# Patient Record
Sex: MALE | Race: ASIAN | NOT HISPANIC OR LATINO
[De-identification: names, ages, dates, MRNs, and addresses within clinical notes are randomized per-mention and may not be internally consistent; named-entity substitution may affect disease eponyms.]

---

## 2017-01-24 ENCOUNTER — RESULT REVIEW (OUTPATIENT)
Age: 82
End: 2017-01-24

## 2017-01-25 ENCOUNTER — INPATIENT (INPATIENT)
Facility: HOSPITAL | Age: 82
LOS: 11 days | Discharge: HOME | End: 2017-02-06
Attending: INTERNAL MEDICINE | Admitting: INTERNAL MEDICINE
Payer: MEDICARE

## 2017-01-25 VITALS
DIASTOLIC BLOOD PRESSURE: 74 MMHG | RESPIRATION RATE: 20 BRPM | SYSTOLIC BLOOD PRESSURE: 160 MMHG | TEMPERATURE: 98 F | OXYGEN SATURATION: 94 % | HEART RATE: 101 BPM

## 2017-01-25 DIAGNOSIS — C91.10 CHRONIC LYMPHOCYTIC LEUKEMIA OF B-CELL TYPE NOT HAVING ACHIEVED REMISSION: ICD-10-CM

## 2017-01-25 DIAGNOSIS — E03.9 HYPOTHYROIDISM, UNSPECIFIED: ICD-10-CM

## 2017-01-25 DIAGNOSIS — J90 PLEURAL EFFUSION, NOT ELSEWHERE CLASSIFIED: ICD-10-CM

## 2017-01-25 DIAGNOSIS — I10 ESSENTIAL (PRIMARY) HYPERTENSION: ICD-10-CM

## 2017-01-25 LAB
ALBUMIN FLD-MCNC: 3.1 G/DL — SIGNIFICANT CHANGE UP
ALBUMIN SERPL ELPH-MCNC: 4.2 G/DL — SIGNIFICANT CHANGE UP (ref 3.3–5)
ALP SERPL-CCNC: 93 U/L — SIGNIFICANT CHANGE UP (ref 40–120)
ALT FLD-CCNC: 10 U/L — SIGNIFICANT CHANGE UP (ref 4–41)
AST SERPL-CCNC: 18 U/L — SIGNIFICANT CHANGE UP (ref 4–40)
BASOPHILS # BLD AUTO: 0.41 K/UL — HIGH (ref 0–0.2)
BASOPHILS NFR BLD AUTO: 0.2 % — SIGNIFICANT CHANGE UP (ref 0–2)
BASOPHILS NFR SPEC: 0 % — SIGNIFICANT CHANGE UP (ref 0–2)
BILIRUB SERPL-MCNC: 1.4 MG/DL — HIGH (ref 0.2–1.2)
BODY FLUID TYPE: SIGNIFICANT CHANGE UP
BUN SERPL-MCNC: 22 MG/DL — SIGNIFICANT CHANGE UP (ref 7–23)
CALCIUM SERPL-MCNC: 9.1 MG/DL — SIGNIFICANT CHANGE UP (ref 8.4–10.5)
CHLORIDE SERPL-SCNC: 101 MMOL/L — SIGNIFICANT CHANGE UP (ref 98–107)
CK MB BLD-MCNC: 1.83 NG/ML — SIGNIFICANT CHANGE UP (ref 1–6.6)
CK MB BLD-MCNC: SIGNIFICANT CHANGE UP (ref 0–2.5)
CK SERPL-CCNC: 27 U/L — LOW (ref 30–200)
CLARITY SPEC: SIGNIFICANT CHANGE UP
CO2 SERPL-SCNC: 21 MMOL/L — LOW (ref 22–31)
COLOR FLD: YELLOW — SIGNIFICANT CHANGE UP
CREAT SERPL-MCNC: 1.11 MG/DL — SIGNIFICANT CHANGE UP (ref 0.5–1.3)
EOSINOPHIL # BLD AUTO: 0.03 K/UL — SIGNIFICANT CHANGE UP (ref 0–0.5)
EOSINOPHIL NFR BLD AUTO: 0 % — SIGNIFICANT CHANGE UP (ref 0–6)
EOSINOPHIL NFR FLD: 0 % — SIGNIFICANT CHANGE UP (ref 0–6)
GLUCOSE FLD-MCNC: 102 MG/DL — SIGNIFICANT CHANGE UP
GLUCOSE SERPL-MCNC: 123 MG/DL — HIGH (ref 70–99)
HCT VFR BLD CALC: 30.2 % — LOW (ref 39–50)
HGB BLD-MCNC: 8.7 G/DL — LOW (ref 13–17)
IMM GRANULOCYTES NFR BLD AUTO: 0.4 % — SIGNIFICANT CHANGE UP (ref 0–1.5)
LDH SERPL L TO P-CCNC: 326 U/L — SIGNIFICANT CHANGE UP
LYMPHOCYTES # BLD AUTO: 181.97 K/UL — HIGH (ref 1–3.3)
LYMPHOCYTES # BLD AUTO: 93.6 % — HIGH (ref 13–44)
LYMPHOCYTES NFR FLD: 93 % — SIGNIFICANT CHANGE UP
LYMPHOCYTES NFR SPEC AUTO: 95 % — HIGH (ref 13–44)
MACROCYTES BLD QL: SIGNIFICANT CHANGE UP
MACROPHAGES # FLD: 3 % — SIGNIFICANT CHANGE UP
MANUAL SMEAR VERIFICATION: SIGNIFICANT CHANGE UP
MCHC RBC-ENTMCNC: 28.8 % — LOW (ref 32–36)
MCHC RBC-ENTMCNC: 31.8 PG — SIGNIFICANT CHANGE UP (ref 27–34)
MCV RBC AUTO: 110.2 FL — HIGH (ref 80–100)
MONOCYTES # BLD AUTO: 1.77 K/UL — HIGH (ref 0–0.9)
MONOCYTES # FLD: 1 % — SIGNIFICANT CHANGE UP
MONOCYTES NFR BLD AUTO: 0.9 % — LOW (ref 2–14)
MONOCYTES NFR BLD: 0 % — LOW (ref 2–9)
NEUTROPHIL AB SER-ACNC: 5 % — LOW (ref 43–77)
NEUTROPHILS # BLD AUTO: 9.52 K/UL — HIGH (ref 1.8–7.4)
NEUTROPHILS NFR BLD AUTO: 4.9 % — LOW (ref 43–77)
NEUTS SEG NFR FLD MANUAL: 2 % — SIGNIFICANT CHANGE UP
NT-PROBNP SERPL-SCNC: 1243 PG/ML — SIGNIFICANT CHANGE UP
OTHER CELLS FLD MANUAL: 1 % — SIGNIFICANT CHANGE UP
PLATELET # BLD AUTO: 162 K/UL — SIGNIFICANT CHANGE UP (ref 150–400)
PLATELET COUNT - ESTIMATE: NORMAL — SIGNIFICANT CHANGE UP
PMV BLD: 8.9 FL — SIGNIFICANT CHANGE UP (ref 7–13)
POTASSIUM SERPL-MCNC: 5.4 MMOL/L — HIGH (ref 3.5–5.3)
POTASSIUM SERPL-SCNC: 5.4 MMOL/L — HIGH (ref 3.5–5.3)
PROT FLD-MCNC: 3.9 G/DL — SIGNIFICANT CHANGE UP
PROT SERPL-MCNC: 6.2 G/DL — SIGNIFICANT CHANGE UP (ref 6–8.3)
RBC # BLD: 2.74 M/UL — LOW (ref 4.2–5.8)
RBC # FLD: 15.4 % — HIGH (ref 10.3–14.5)
RCV VOL RI: 420 CELL/UL — HIGH (ref 0–5)
SODIUM SERPL-SCNC: 140 MMOL/L — SIGNIFICANT CHANGE UP (ref 135–145)
TOTAL CELLS COUNTED, BODY FLUID: 100 CELLS — SIGNIFICANT CHANGE UP
TOTAL NUCLEATED CELL COUNT, BODY FLUID: 840 CELL/UL — HIGH (ref 0–5)
TROPONIN T SERPL-MCNC: < 0.06 NG/ML — SIGNIFICANT CHANGE UP (ref 0–0.06)
WBC # BLD: 194.45 K/UL — CRITICAL HIGH (ref 3.8–10.5)
WBC # FLD AUTO: 194.45 K/UL — CRITICAL HIGH (ref 3.8–10.5)

## 2017-01-25 PROCEDURE — 88112 CYTOPATH CELL ENHANCE TECH: CPT | Mod: 26

## 2017-01-25 PROCEDURE — 71010: CPT | Mod: 26,59

## 2017-01-25 PROCEDURE — 88305 TISSUE EXAM BY PATHOLOGIST: CPT | Mod: 26

## 2017-01-25 PROCEDURE — 99223 1ST HOSP IP/OBS HIGH 75: CPT | Mod: GC

## 2017-01-25 PROCEDURE — 71020: CPT | Mod: 26

## 2017-01-25 RX ORDER — MORPHINE SULFATE 50 MG/1
2 CAPSULE, EXTENDED RELEASE ORAL ONCE
Qty: 0 | Refills: 0 | Status: DISCONTINUED | OUTPATIENT
Start: 2017-01-25 | End: 2017-01-25

## 2017-01-25 RX ORDER — LEVOTHYROXINE SODIUM 125 MCG
100 TABLET ORAL DAILY
Qty: 0 | Refills: 0 | Status: DISCONTINUED | OUTPATIENT
Start: 2017-01-25 | End: 2017-02-06

## 2017-01-25 RX ORDER — ALLOPURINOL 300 MG
300 TABLET ORAL DAILY
Qty: 0 | Refills: 0 | Status: DISCONTINUED | OUTPATIENT
Start: 2017-01-25 | End: 2017-02-06

## 2017-01-25 RX ORDER — FINASTERIDE 5 MG/1
5 TABLET, FILM COATED ORAL DAILY
Qty: 0 | Refills: 0 | Status: DISCONTINUED | OUTPATIENT
Start: 2017-01-25 | End: 2017-02-06

## 2017-01-25 RX ORDER — ATORVASTATIN CALCIUM 80 MG/1
20 TABLET, FILM COATED ORAL AT BEDTIME
Qty: 0 | Refills: 0 | Status: DISCONTINUED | OUTPATIENT
Start: 2017-01-25 | End: 2017-02-06

## 2017-01-25 RX ORDER — MORPHINE SULFATE 50 MG/1
2 CAPSULE, EXTENDED RELEASE ORAL EVERY 4 HOURS
Qty: 0 | Refills: 0 | Status: DISCONTINUED | OUTPATIENT
Start: 2017-01-25 | End: 2017-01-27

## 2017-01-25 RX ORDER — ALBUTEROL 90 UG/1
2.5 AEROSOL, METERED ORAL ONCE
Qty: 0 | Refills: 0 | Status: COMPLETED | OUTPATIENT
Start: 2017-01-25 | End: 2017-01-25

## 2017-01-25 RX ORDER — ENOXAPARIN SODIUM 100 MG/ML
40 INJECTION SUBCUTANEOUS EVERY 24 HOURS
Qty: 0 | Refills: 0 | Status: DISCONTINUED | OUTPATIENT
Start: 2017-01-25 | End: 2017-02-06

## 2017-01-25 RX ORDER — SODIUM CHLORIDE 9 MG/ML
1000 INJECTION, SOLUTION INTRAVENOUS ONCE
Qty: 0 | Refills: 0 | Status: COMPLETED | OUTPATIENT
Start: 2017-01-25 | End: 2017-01-25

## 2017-01-25 RX ORDER — IPRATROPIUM/ALBUTEROL SULFATE 18-103MCG
3 AEROSOL WITH ADAPTER (GRAM) INHALATION EVERY 6 HOURS
Qty: 0 | Refills: 0 | Status: DISCONTINUED | OUTPATIENT
Start: 2017-01-25 | End: 2017-02-06

## 2017-01-25 RX ORDER — AMLODIPINE BESYLATE 2.5 MG/1
10 TABLET ORAL DAILY
Qty: 0 | Refills: 0 | Status: DISCONTINUED | OUTPATIENT
Start: 2017-01-25 | End: 2017-02-06

## 2017-01-25 RX ADMIN — ALBUTEROL 2.5 MILLIGRAM(S): 90 AEROSOL, METERED ORAL at 18:00

## 2017-01-25 RX ADMIN — MORPHINE SULFATE 2 MILLIGRAM(S): 50 CAPSULE, EXTENDED RELEASE ORAL at 23:30

## 2017-01-25 RX ADMIN — SODIUM CHLORIDE 500 MILLILITER(S): 9 INJECTION, SOLUTION INTRAVENOUS at 20:49

## 2017-01-25 RX ADMIN — MORPHINE SULFATE 2 MILLIGRAM(S): 50 CAPSULE, EXTENDED RELEASE ORAL at 22:59

## 2017-01-25 RX ADMIN — MORPHINE SULFATE 2 MILLIGRAM(S): 50 CAPSULE, EXTENDED RELEASE ORAL at 20:49

## 2017-01-25 NOTE — ED ADULT NURSE NOTE - OBJECTIVE STATEMENT
Pt recd A+Ox3. C/o SOBx6 weeks, worsening with cough. Denies any CP or hx of cardiac issues. SOB worse with any kind of activity. Large right pleural effusion shown on bedside US. Pt is on 2L NC at this time with 100% O2 sat. Breathing is equal and unlabored at this time at rest. Cardiac monitor in place and VSS. Labs drawn and sent. Will continue to monitor.

## 2017-01-25 NOTE — ED PROVIDER NOTE - OBJECTIVE STATEMENT
86M pmh CLL (finished chemo 1 yr ago), HTN, HLD, Hypothyroid p/w sob. pt has had new onset sob 1 mo associtaed w/ productive cough, worsening. ros + for orthopnea. denies cp, f/c, n/v/d, abdominla pain, dysuria, hx of cardiac dz, recent travel, hx of blood clot, le swelling. quite smoking ~50 years ago, has tried inhaler w/o relief

## 2017-01-25 NOTE — H&P ADULT. - PMH
Adult Hypothyroidism    Benign Prostatic Hypertrophy    CLL (chronic lymphocytic leukemia)    Hyperlipidemia    Hypertension

## 2017-01-25 NOTE — H&P ADULT. - ASSESSMENT
86M hx CLL s/p chemo (RCD regimen), HTN, HLD, hypothyroidism, BPH, p/w progressive SOB & cough, found to have large R-pleural effusion, s/p thoracentesis w/ findings c/w exudative effusion

## 2017-01-25 NOTE — ED PROVIDER NOTE - PROGRESS NOTE DETAILS
iker: bedside u/s shows r pleural effusion & enlarged spleen iker: drained 2200cc fluid from thoracentesis, pts so2 97% on ra, breathing improved. labs significant for cll flare up. will adm to hospitilist. k a little hihg but given no ekg changes, will just repeat iker: drained 2200cc fluid from thoracentesis, pts so2 97% on ra, breathing improved. . will adm to hospitilist. k a little hihg but given no ekg changes, will just repeat

## 2017-01-25 NOTE — H&P ADULT. - HISTORY OF PRESENT ILLNESS
86M hx CLL s/p chemo (RCD regimen), HTN, HLD, hypothyroidism, BPH, p/w progressive SOB & cough. Pt reports SOB on exertion & productive cough x 6 weeks.  He came to ED b/c the SOB was worsening & he began to have orthopnea  He had an evaluation by a pulmonologist a few days ago & was started on an inhaler w/out any improvement in the SOB. He denies fevers, chills, CP, abd pain, N, V, changes in urinary or bowel patterns.  He has had CLL for many years & started chemo about 3 years ago.  He completed a course of rituximab, cyclophosphamide dexamethasone in 1/2016.  He had AIHA a few years ago that is now resolved.     ED course:  T 97.8, , /74, RR 20, O2 94% RA.  Labs remarkable for , lymphocyte predominant, K 5.4.  Bedside US in ED revealed large R-simple pleural effusion. He underwent thoracentesis w/ drainage of 2.2L.  O2 improved to 97%. 86M hx CLL s/p chemo (RCD regimen), HTN, HLD, hypothyroidism, BPH, p/w progressive SOB & cough. Pt reports SOB on exertion & productive cough x 6 weeks.  He came to ED b/c the SOB was worsening & he began to have orthopnea  He had an evaluation by a pulmonologist a few days ago & was started on an inhaler w/out any improvement in the SOB. He denies fevers, chills, CP, abd pain, N, V, changes in urinary or bowel patterns.  He has had CLL for many years & started chemo about 3 years ago.  He completed a course of rituximab, cyclophosphamide dexamethasone in 1/2016.  He had AIHA a few years ago that is now resolved.     ED course:  T 97.8, , /74, RR 20, O2 94% RA.  Labs remarkable for , lymphocyte predominant, K 5.4.  Bedside US in ED revealed large R-simple pleural effusion. He underwent thoracentesis w/ drainage of 2.2L.  O2 improved to 97% on RA

## 2017-01-25 NOTE — ED PROCEDURE NOTE - CPROC ED THORACENTES DETAIL1
The anatomic location was identified, and needle (see size above) was introduced into the pleural space. Sterile technique was used throughout procedure.

## 2017-01-25 NOTE — ED PROVIDER NOTE - CARE PLAN
Principal Discharge DX:	Pleural effusion Principal Discharge DX:	Pleural effusion  Secondary Diagnosis:	CLL (chronic lymphocytic leukemia)

## 2017-01-25 NOTE — ED PROVIDER NOTE - ATTENDING CONTRIBUTION TO CARE
86m, pmhx cll, remote smoker, in remission x 1 year. p/w 6 weeks of progressive sob and cough. no c/p, no leg edema. no f/c, wt loss. no n/v. exam, tachypneic, mild access muscle use. decrease breath sounds on right. hs normal, legs normal. bedside us reveals large right pleural effusion, not known to pt. will get labs, cxr, thoracentesis, admit.

## 2017-01-25 NOTE — H&P ADULT. - PROBLEM SELECTOR PLAN 1
SOB 2/2 pleural effusion.  Based on Light’s criteria, effusion is exudative. Suspect malignant effusion given CLL hx.  Well's score 2.5 which is low risk for PE.   -S/p diagnostic & therapeutic thoracentesis in ED  -Pulm c/s in AM re: further thoracentesis & need for pleurex catheter given risk of reaccumulation   -DuoNeb & O2 PRN

## 2017-01-25 NOTE — H&P ADULT. - PROBLEM SELECTOR PLAN 2
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

## 2017-01-25 NOTE — ED PROCEDURE NOTE - PROCEDURE ADDITIONAL DETAILS
74136, Ultrasound, limited, Thorax  Focused ED ultrasound of the lungs and pleura:    Indication: sob    Findings: Normal lung sliding bilaterally  No signs of interstitial syndrome  Large right sided effusion visualized in all lung fields, including bilaterally. simple effusion.  Impression: large right sided effusion    Procedure note and images placed in chart

## 2017-01-25 NOTE — ED PROVIDER NOTE - MEDICAL DECISION MAKING DETAILS
ddx: R sided pleural effusion vs pna vs possible mets to lungs vs copd. no e/o of ptx. will r/o chf & acs  -cxr -albuterol -thoracentesis -ekg -consider admission

## 2017-01-25 NOTE — ED ADULT NURSE REASSESSMENT NOTE - NS ED NURSE REASSESS COMMENT FT1
Patient is in NAD, and has room available.  Report given to nurse Nelson on floor via phone.  Patient awaiting transportation.  Will continue to monitor patient closely.

## 2017-01-25 NOTE — H&P ADULT. - FAMILY HISTORY
Mother  Still living? Unknown  Family history of cerebrovascular accident (CVA), Age at diagnosis: Age Unknown

## 2017-01-26 LAB
BASOPHILS # BLD AUTO: 1.01 K/UL — HIGH (ref 0–0.2)
BASOPHILS NFR BLD AUTO: 0.4 % — SIGNIFICANT CHANGE UP (ref 0–2)
BUN SERPL-MCNC: 23 MG/DL — SIGNIFICANT CHANGE UP (ref 7–23)
CALCIUM SERPL-MCNC: 8.9 MG/DL — SIGNIFICANT CHANGE UP (ref 8.4–10.5)
CHLORIDE SERPL-SCNC: 103 MMOL/L — SIGNIFICANT CHANGE UP (ref 98–107)
CO2 SERPL-SCNC: 21 MMOL/L — LOW (ref 22–31)
CREAT SERPL-MCNC: 1.16 MG/DL — SIGNIFICANT CHANGE UP (ref 0.5–1.3)
EOSINOPHIL # BLD AUTO: 0.09 K/UL — SIGNIFICANT CHANGE UP (ref 0–0.5)
EOSINOPHIL NFR BLD AUTO: 0 % — SIGNIFICANT CHANGE UP (ref 0–6)
GLUCOSE SERPL-MCNC: 121 MG/DL — HIGH (ref 70–99)
GRAM STN FLD: SIGNIFICANT CHANGE UP
HCT VFR BLD CALC: 30.2 % — LOW (ref 39–50)
HGB BLD-MCNC: 8.6 G/DL — LOW (ref 13–17)
IMM GRANULOCYTES NFR BLD AUTO: 0.5 % — SIGNIFICANT CHANGE UP (ref 0–1.5)
LDH SERPL L TO P-CCNC: 240 U/L — HIGH (ref 135–225)
LYMPHOCYTES # BLD AUTO: 230.97 K/UL — HIGH (ref 1–3.3)
LYMPHOCYTES # BLD AUTO: 93.5 % — HIGH (ref 13–44)
MAGNESIUM SERPL-MCNC: 1.7 MG/DL — SIGNIFICANT CHANGE UP (ref 1.6–2.6)
MANUAL SMEAR VERIFICATION: SIGNIFICANT CHANGE UP
MCHC RBC-ENTMCNC: 28.5 % — LOW (ref 32–36)
MCHC RBC-ENTMCNC: 31.6 PG — SIGNIFICANT CHANGE UP (ref 27–34)
MCV RBC AUTO: 111 FL — HIGH (ref 80–100)
MONOCYTES # BLD AUTO: 4.05 K/UL — HIGH (ref 0–0.9)
MONOCYTES NFR BLD AUTO: 1.6 % — LOW (ref 2–14)
NEUTROPHILS # BLD AUTO: 9.58 K/UL — HIGH (ref 1.8–7.4)
NEUTROPHILS NFR BLD AUTO: 4 % — LOW (ref 43–77)
PHOSPHATE SERPL-MCNC: 3.7 MG/DL — SIGNIFICANT CHANGE UP (ref 2.5–4.5)
PLATELET # BLD AUTO: 192 K/UL — SIGNIFICANT CHANGE UP (ref 150–400)
PMV BLD: 8.8 FL — SIGNIFICANT CHANGE UP (ref 7–13)
POTASSIUM SERPL-MCNC: 4.4 MMOL/L — SIGNIFICANT CHANGE UP (ref 3.5–5.3)
POTASSIUM SERPL-SCNC: 4.4 MMOL/L — SIGNIFICANT CHANGE UP (ref 3.5–5.3)
RBC # BLD: 2.72 M/UL — LOW (ref 4.2–5.8)
RBC # FLD: 15.4 % — HIGH (ref 10.3–14.5)
SODIUM SERPL-SCNC: 140 MMOL/L — SIGNIFICANT CHANGE UP (ref 135–145)
SPECIMEN SOURCE: SIGNIFICANT CHANGE UP
T4 FREE SERPL-MCNC: 1.55 NG/DL — SIGNIFICANT CHANGE UP (ref 0.9–1.8)
TSH SERPL-MCNC: 7.03 UIU/ML — HIGH (ref 0.27–4.2)
URATE SERPL-MCNC: 3.8 MG/DL — SIGNIFICANT CHANGE UP (ref 3.4–8.8)
WBC # BLD: 246.91 K/UL — CRITICAL HIGH (ref 3.8–10.5)
WBC # FLD AUTO: 246.91 K/UL — CRITICAL HIGH (ref 3.8–10.5)

## 2017-01-26 PROCEDURE — 99222 1ST HOSP IP/OBS MODERATE 55: CPT | Mod: GC

## 2017-01-26 PROCEDURE — 99233 SBSQ HOSP IP/OBS HIGH 50: CPT | Mod: GC

## 2017-01-26 RX ADMIN — FINASTERIDE 5 MILLIGRAM(S): 5 TABLET, FILM COATED ORAL at 13:30

## 2017-01-26 RX ADMIN — Medication 100 MICROGRAM(S): at 05:09

## 2017-01-26 RX ADMIN — ENOXAPARIN SODIUM 40 MILLIGRAM(S): 100 INJECTION SUBCUTANEOUS at 05:09

## 2017-01-26 RX ADMIN — AMLODIPINE BESYLATE 10 MILLIGRAM(S): 2.5 TABLET ORAL at 05:09

## 2017-01-26 RX ADMIN — ATORVASTATIN CALCIUM 20 MILLIGRAM(S): 80 TABLET, FILM COATED ORAL at 21:49

## 2017-01-26 RX ADMIN — Medication 300 MILLIGRAM(S): at 13:30

## 2017-01-27 LAB
ANTIBODY ID 1_1: SIGNIFICANT CHANGE UP
APTT BLD: 36.6 SEC — SIGNIFICANT CHANGE UP (ref 27.5–37.4)
BASOPHILS # BLD AUTO: 0.47 K/UL — HIGH (ref 0–0.2)
BASOPHILS NFR BLD AUTO: 0.3 % — SIGNIFICANT CHANGE UP (ref 0–2)
BLD GP AB SCN SERPL QL: POSITIVE — SIGNIFICANT CHANGE UP
BUN SERPL-MCNC: 19 MG/DL — SIGNIFICANT CHANGE UP (ref 7–23)
CALCIUM SERPL-MCNC: 8.2 MG/DL — LOW (ref 8.4–10.5)
CHLORIDE SERPL-SCNC: 103 MMOL/L — SIGNIFICANT CHANGE UP (ref 98–107)
CO2 SERPL-SCNC: 21 MMOL/L — LOW (ref 22–31)
CREAT SERPL-MCNC: 1.11 MG/DL — SIGNIFICANT CHANGE UP (ref 0.5–1.3)
DAT C3-SP REAG RBC QL: POSITIVE — SIGNIFICANT CHANGE UP
DAT POLY-SP REAG RBC QL: POSITIVE — SIGNIFICANT CHANGE UP
DIRECT COOMBS IGG: POSITIVE — SIGNIFICANT CHANGE UP
ELUATE ANTIBODY 1: SIGNIFICANT CHANGE UP
EOSINOPHIL # BLD AUTO: 0.12 K/UL — SIGNIFICANT CHANGE UP (ref 0–0.5)
EOSINOPHIL NFR BLD AUTO: 0.1 % — SIGNIFICANT CHANGE UP (ref 0–6)
GLUCOSE SERPL-MCNC: 88 MG/DL — SIGNIFICANT CHANGE UP (ref 70–99)
HAPTOGLOB SERPL-MCNC: 226 MG/DL — HIGH (ref 34–200)
HCT VFR BLD CALC: 24.4 % — LOW (ref 39–50)
HGB BLD-MCNC: 7.4 G/DL — LOW (ref 13–17)
IMM GRANULOCYTES NFR BLD AUTO: 0.2 % — SIGNIFICANT CHANGE UP (ref 0–1.5)
INR BLD: 1.25 — HIGH (ref 0.87–1.18)
LDH SERPL L TO P-CCNC: 180 U/L — SIGNIFICANT CHANGE UP (ref 135–225)
LYMPHOCYTES # BLD AUTO: 134.13 K/UL — HIGH (ref 1–3.3)
LYMPHOCYTES # BLD AUTO: 93.3 % — HIGH (ref 13–44)
MAGNESIUM SERPL-MCNC: 1.8 MG/DL — SIGNIFICANT CHANGE UP (ref 1.6–2.6)
MANUAL SMEAR VERIFICATION: SIGNIFICANT CHANGE UP
MCHC RBC-ENTMCNC: 30.3 % — LOW (ref 32–36)
MCHC RBC-ENTMCNC: 32.5 PG — SIGNIFICANT CHANGE UP (ref 27–34)
MCV RBC AUTO: 107 FL — HIGH (ref 80–100)
MONOCYTES # BLD AUTO: 1.39 K/UL — HIGH (ref 0–0.9)
MONOCYTES NFR BLD AUTO: 1 % — LOW (ref 2–14)
NEUTROPHILS # BLD AUTO: 7.4 K/UL — SIGNIFICANT CHANGE UP (ref 1.8–7.4)
NEUTROPHILS NFR BLD AUTO: 5.1 % — LOW (ref 43–77)
PHOSPHATE SERPL-MCNC: 2.8 MG/DL — SIGNIFICANT CHANGE UP (ref 2.5–4.5)
PLATELET # BLD AUTO: 126 K/UL — LOW (ref 150–400)
PMV BLD: 8.5 FL — SIGNIFICANT CHANGE UP (ref 7–13)
POTASSIUM SERPL-MCNC: 3.6 MMOL/L — SIGNIFICANT CHANGE UP (ref 3.5–5.3)
POTASSIUM SERPL-SCNC: 3.6 MMOL/L — SIGNIFICANT CHANGE UP (ref 3.5–5.3)
PROTHROM AB SERPL-ACNC: 14.3 SEC — HIGH (ref 10–13.1)
RBC # BLD: 2.28 M/UL — LOW (ref 4.2–5.8)
RBC # FLD: 15.2 % — HIGH (ref 10.3–14.5)
RETICS #: 225.6 10X3/UL — HIGH (ref 17–73)
RETICS/RBC NFR: 9.9 % — HIGH (ref 0.5–2.5)
RH IG SCN BLD-IMP: POSITIVE — SIGNIFICANT CHANGE UP
RH IG SCN BLD-IMP: POSITIVE — SIGNIFICANT CHANGE UP
SODIUM SERPL-SCNC: 141 MMOL/L — SIGNIFICANT CHANGE UP (ref 135–145)
URATE SERPL-MCNC: 3.6 MG/DL — SIGNIFICANT CHANGE UP (ref 3.4–8.8)
WBC # BLD: 143.83 K/UL — CRITICAL HIGH (ref 3.8–10.5)
WBC # FLD AUTO: 143.83 K/UL — CRITICAL HIGH (ref 3.8–10.5)

## 2017-01-27 PROCEDURE — 86077 PHYS BLOOD BANK SERV XMATCH: CPT

## 2017-01-27 PROCEDURE — 99233 SBSQ HOSP IP/OBS HIGH 50: CPT | Mod: GC

## 2017-01-27 PROCEDURE — 88189 FLOWCYTOMETRY/READ 16 & >: CPT

## 2017-01-27 RX ADMIN — Medication 100 MICROGRAM(S): at 05:40

## 2017-01-27 RX ADMIN — AMLODIPINE BESYLATE 10 MILLIGRAM(S): 2.5 TABLET ORAL at 05:40

## 2017-01-27 RX ADMIN — FINASTERIDE 5 MILLIGRAM(S): 5 TABLET, FILM COATED ORAL at 13:47

## 2017-01-27 RX ADMIN — ATORVASTATIN CALCIUM 20 MILLIGRAM(S): 80 TABLET, FILM COATED ORAL at 21:40

## 2017-01-27 RX ADMIN — ENOXAPARIN SODIUM 40 MILLIGRAM(S): 100 INJECTION SUBCUTANEOUS at 05:40

## 2017-01-27 RX ADMIN — Medication 300 MILLIGRAM(S): at 13:47

## 2017-01-27 NOTE — PHYSICAL THERAPY INITIAL EVALUATION ADULT - PERTINENT HX OF CURRENT PROBLEM, REHAB EVAL
86M hx CLL s/p chemo (RCD regimen), HTN, HLD, hypothyroidism, BPH, p/w progressive SOB & cough. Pt reports SOB on exertion & productive cough x 6 weeks.

## 2017-01-28 LAB
ALBUMIN SERPL ELPH-MCNC: 3.4 G/DL — SIGNIFICANT CHANGE UP (ref 3.3–5)
ALP SERPL-CCNC: 73 U/L — SIGNIFICANT CHANGE UP (ref 40–120)
ALT FLD-CCNC: 6 U/L — SIGNIFICANT CHANGE UP (ref 4–41)
AST SERPL-CCNC: 10 U/L — SIGNIFICANT CHANGE UP (ref 4–40)
BASOPHILS # BLD AUTO: 0.35 K/UL — HIGH (ref 0–0.2)
BASOPHILS # BLD AUTO: 0.54 K/UL — HIGH (ref 0–0.2)
BASOPHILS NFR BLD AUTO: 0.2 % — SIGNIFICANT CHANGE UP (ref 0–2)
BASOPHILS NFR BLD AUTO: 0.3 % — SIGNIFICANT CHANGE UP (ref 0–2)
BILIRUB SERPL-MCNC: 1.3 MG/DL — HIGH (ref 0.2–1.2)
BUN SERPL-MCNC: 20 MG/DL — SIGNIFICANT CHANGE UP (ref 7–23)
CALCIUM SERPL-MCNC: 8.4 MG/DL — SIGNIFICANT CHANGE UP (ref 8.4–10.5)
CHLORIDE SERPL-SCNC: 104 MMOL/L — SIGNIFICANT CHANGE UP (ref 98–107)
CO2 SERPL-SCNC: 22 MMOL/L — SIGNIFICANT CHANGE UP (ref 22–31)
CREAT SERPL-MCNC: 1.09 MG/DL — SIGNIFICANT CHANGE UP (ref 0.5–1.3)
EOSINOPHIL # BLD AUTO: 0.12 K/UL — SIGNIFICANT CHANGE UP (ref 0–0.5)
EOSINOPHIL # BLD AUTO: 0.14 K/UL — SIGNIFICANT CHANGE UP (ref 0–0.5)
EOSINOPHIL NFR BLD AUTO: 0.1 % — SIGNIFICANT CHANGE UP (ref 0–6)
EOSINOPHIL NFR BLD AUTO: 0.1 % — SIGNIFICANT CHANGE UP (ref 0–6)
GLUCOSE SERPL-MCNC: 109 MG/DL — HIGH (ref 70–99)
HAPTOGLOB SERPL-MCNC: 236 MG/DL — HIGH (ref 34–200)
HCT VFR BLD CALC: 23.5 % — LOW (ref 39–50)
HCT VFR BLD CALC: 26.5 % — LOW (ref 39–50)
HGB BLD-MCNC: 7.1 G/DL — LOW (ref 13–17)
HGB BLD-MCNC: 7.7 G/DL — LOW (ref 13–17)
IMM GRANULOCYTES NFR BLD AUTO: 0.2 % — SIGNIFICANT CHANGE UP (ref 0–1.5)
IMM GRANULOCYTES NFR BLD AUTO: 0.3 % — SIGNIFICANT CHANGE UP (ref 0–1.5)
LDH SERPL L TO P-CCNC: 218 U/L — SIGNIFICANT CHANGE UP (ref 135–225)
LYMPHOCYTES # BLD AUTO: 136.77 K/UL — HIGH (ref 1–3.3)
LYMPHOCYTES # BLD AUTO: 183.42 K/UL — HIGH (ref 1–3.3)
LYMPHOCYTES # BLD AUTO: 93.2 % — HIGH (ref 13–44)
LYMPHOCYTES # BLD AUTO: 94.1 % — HIGH (ref 13–44)
MAGNESIUM SERPL-MCNC: 1.8 MG/DL — SIGNIFICANT CHANGE UP (ref 1.6–2.6)
MANUAL SMEAR VERIFICATION: SIGNIFICANT CHANGE UP
MANUAL SMEAR VERIFICATION: SIGNIFICANT CHANGE UP
MCHC RBC-ENTMCNC: 29.1 % — LOW (ref 32–36)
MCHC RBC-ENTMCNC: 30.2 % — LOW (ref 32–36)
MCHC RBC-ENTMCNC: 31.8 PG — SIGNIFICANT CHANGE UP (ref 27–34)
MCHC RBC-ENTMCNC: 32 PG — SIGNIFICANT CHANGE UP (ref 27–34)
MCV RBC AUTO: 105.9 FL — HIGH (ref 80–100)
MCV RBC AUTO: 109.5 FL — HIGH (ref 80–100)
MONOCYTES # BLD AUTO: 1.55 K/UL — HIGH (ref 0–0.9)
MONOCYTES # BLD AUTO: 2.45 K/UL — HIGH (ref 0–0.9)
MONOCYTES NFR BLD AUTO: 1.1 % — LOW (ref 2–14)
MONOCYTES NFR BLD AUTO: 1.3 % — LOW (ref 2–14)
NEUTROPHILS # BLD AUTO: 7.64 K/UL — HIGH (ref 1.8–7.4)
NEUTROPHILS # BLD AUTO: 7.75 K/UL — HIGH (ref 1.8–7.4)
NEUTROPHILS NFR BLD AUTO: 3.9 % — LOW (ref 43–77)
NEUTROPHILS NFR BLD AUTO: 5.2 % — LOW (ref 43–77)
OB PNL STL: NEGATIVE — SIGNIFICANT CHANGE UP
PHOSPHATE SERPL-MCNC: 2.8 MG/DL — SIGNIFICANT CHANGE UP (ref 2.5–4.5)
PLATELET # BLD AUTO: 136 K/UL — LOW (ref 150–400)
PLATELET # BLD AUTO: 165 K/UL — SIGNIFICANT CHANGE UP (ref 150–400)
PMV BLD: 8.6 FL — SIGNIFICANT CHANGE UP (ref 7–13)
PMV BLD: 8.9 FL — SIGNIFICANT CHANGE UP (ref 7–13)
POTASSIUM SERPL-MCNC: 4.1 MMOL/L — SIGNIFICANT CHANGE UP (ref 3.5–5.3)
POTASSIUM SERPL-SCNC: 4.1 MMOL/L — SIGNIFICANT CHANGE UP (ref 3.5–5.3)
PROT SERPL-MCNC: 5.2 G/DL — LOW (ref 6–8.3)
RBC # BLD: 2.22 M/UL — LOW (ref 4.2–5.8)
RBC # BLD: 2.42 M/UL — LOW (ref 4.2–5.8)
RBC # FLD: 15.2 % — HIGH (ref 10.3–14.5)
RBC # FLD: 15.3 % — HIGH (ref 10.3–14.5)
RETICS #: 243.1 10X3/UL — HIGH (ref 17–73)
RETICS/RBC NFR: 10.9 % — HIGH (ref 0.5–2.5)
SODIUM SERPL-SCNC: 141 MMOL/L — SIGNIFICANT CHANGE UP (ref 135–145)
URATE SERPL-MCNC: 3.3 MG/DL — LOW (ref 3.4–8.8)
WBC # BLD: 146.75 K/UL — CRITICAL HIGH (ref 3.8–10.5)
WBC # BLD: 194.97 K/UL — CRITICAL HIGH (ref 3.8–10.5)
WBC # FLD AUTO: 146.75 K/UL — CRITICAL HIGH (ref 3.8–10.5)
WBC # FLD AUTO: 194.97 K/UL — CRITICAL HIGH (ref 3.8–10.5)

## 2017-01-28 PROCEDURE — 99233 SBSQ HOSP IP/OBS HIGH 50: CPT | Mod: GC

## 2017-01-28 RX ADMIN — FINASTERIDE 5 MILLIGRAM(S): 5 TABLET, FILM COATED ORAL at 13:46

## 2017-01-28 RX ADMIN — Medication 300 MILLIGRAM(S): at 13:46

## 2017-01-28 RX ADMIN — Medication 100 MICROGRAM(S): at 05:39

## 2017-01-28 RX ADMIN — ENOXAPARIN SODIUM 40 MILLIGRAM(S): 100 INJECTION SUBCUTANEOUS at 05:39

## 2017-01-28 RX ADMIN — ATORVASTATIN CALCIUM 20 MILLIGRAM(S): 80 TABLET, FILM COATED ORAL at 21:20

## 2017-01-28 RX ADMIN — AMLODIPINE BESYLATE 10 MILLIGRAM(S): 2.5 TABLET ORAL at 05:39

## 2017-01-29 LAB
ALBUMIN SERPL ELPH-MCNC: 3.6 G/DL — SIGNIFICANT CHANGE UP (ref 3.3–5)
ALP SERPL-CCNC: 80 U/L — SIGNIFICANT CHANGE UP (ref 40–120)
ALT FLD-CCNC: 10 U/L — SIGNIFICANT CHANGE UP (ref 4–41)
ANISOCYTOSIS BLD QL: SIGNIFICANT CHANGE UP
AST SERPL-CCNC: 29 U/L — SIGNIFICANT CHANGE UP (ref 4–40)
BASOPHILS # BLD AUTO: 0.57 K/UL — HIGH (ref 0–0.2)
BASOPHILS NFR BLD AUTO: 0.3 % — SIGNIFICANT CHANGE UP (ref 0–2)
BASOPHILS NFR SPEC: 0 % — SIGNIFICANT CHANGE UP (ref 0–2)
BILIRUB SERPL-MCNC: 1.5 MG/DL — HIGH (ref 0.2–1.2)
BUN SERPL-MCNC: 15 MG/DL — SIGNIFICANT CHANGE UP (ref 7–23)
BUN SERPL-MCNC: 17 MG/DL — SIGNIFICANT CHANGE UP (ref 7–23)
CALCIUM SERPL-MCNC: 8.4 MG/DL — SIGNIFICANT CHANGE UP (ref 8.4–10.5)
CALCIUM SERPL-MCNC: 8.7 MG/DL — SIGNIFICANT CHANGE UP (ref 8.4–10.5)
CHLORIDE SERPL-SCNC: 102 MMOL/L — SIGNIFICANT CHANGE UP (ref 98–107)
CHLORIDE SERPL-SCNC: 99 MMOL/L — SIGNIFICANT CHANGE UP (ref 98–107)
CO2 SERPL-SCNC: 22 MMOL/L — SIGNIFICANT CHANGE UP (ref 22–31)
CO2 SERPL-SCNC: 22 MMOL/L — SIGNIFICANT CHANGE UP (ref 22–31)
CREAT SERPL-MCNC: 1.04 MG/DL — SIGNIFICANT CHANGE UP (ref 0.5–1.3)
CREAT SERPL-MCNC: 1.07 MG/DL — SIGNIFICANT CHANGE UP (ref 0.5–1.3)
EOSINOPHIL # BLD AUTO: 0.13 K/UL — SIGNIFICANT CHANGE UP (ref 0–0.5)
EOSINOPHIL NFR BLD AUTO: 0.1 % — SIGNIFICANT CHANGE UP (ref 0–6)
EOSINOPHIL NFR FLD: 1 % — SIGNIFICANT CHANGE UP (ref 0–6)
GLUCOSE SERPL-MCNC: 113 MG/DL — HIGH (ref 70–99)
GLUCOSE SERPL-MCNC: 130 MG/DL — HIGH (ref 70–99)
HAPTOGLOB SERPL-MCNC: 248 MG/DL — HIGH (ref 34–200)
HCT VFR BLD CALC: 24.1 % — LOW (ref 39–50)
HGB BLD-MCNC: 7.4 G/DL — LOW (ref 13–17)
HYPOCHROMIA BLD QL: SLIGHT — SIGNIFICANT CHANGE UP
IMM GRANULOCYTES NFR BLD AUTO: 0.3 % — SIGNIFICANT CHANGE UP (ref 0–1.5)
LDH SERPL L TO P-CCNC: 333 U/L — HIGH (ref 135–225)
LYMPHOCYTES # BLD AUTO: 196.57 K/UL — HIGH (ref 1–3.3)
LYMPHOCYTES # BLD AUTO: 94.3 % — HIGH (ref 13–44)
LYMPHOCYTES NFR SPEC AUTO: 76 % — HIGH (ref 13–44)
MACROCYTES BLD QL: SIGNIFICANT CHANGE UP
MAGNESIUM SERPL-MCNC: 1.7 MG/DL — SIGNIFICANT CHANGE UP (ref 1.6–2.6)
MANUAL SMEAR VERIFICATION: SIGNIFICANT CHANGE UP
MCHC RBC-ENTMCNC: 30.7 % — LOW (ref 32–36)
MCHC RBC-ENTMCNC: 32.9 PG — SIGNIFICANT CHANGE UP (ref 27–34)
MCV RBC AUTO: 107.1 FL — HIGH (ref 80–100)
MICROCYTES BLD QL: SLIGHT — SIGNIFICANT CHANGE UP
MONOCYTES # BLD AUTO: 2.53 K/UL — HIGH (ref 0–0.9)
MONOCYTES NFR BLD AUTO: 1.2 % — LOW (ref 2–14)
MONOCYTES NFR BLD: 1 % — LOW (ref 2–9)
NEUTROPHIL AB SER-ACNC: 14 % — LOW (ref 43–77)
NEUTROPHILS # BLD AUTO: 8.15 K/UL — HIGH (ref 1.8–7.4)
NEUTROPHILS NFR BLD AUTO: 3.8 % — LOW (ref 43–77)
PHOSPHATE SERPL-MCNC: 3.1 MG/DL — SIGNIFICANT CHANGE UP (ref 2.5–4.5)
PLATELET # BLD AUTO: 181 K/UL — SIGNIFICANT CHANGE UP (ref 150–400)
PMV BLD: 8.6 FL — SIGNIFICANT CHANGE UP (ref 7–13)
POIKILOCYTOSIS BLD QL AUTO: SLIGHT — SIGNIFICANT CHANGE UP
POLYCHROMASIA BLD QL SMEAR: SLIGHT — SIGNIFICANT CHANGE UP
POTASSIUM SERPL-MCNC: 4 MMOL/L — SIGNIFICANT CHANGE UP (ref 3.5–5.3)
POTASSIUM SERPL-MCNC: 5.5 MMOL/L — HIGH (ref 3.5–5.3)
POTASSIUM SERPL-MCNC: 6.9 MMOL/L — CRITICAL HIGH (ref 3.5–5.3)
POTASSIUM SERPL-SCNC: 4 MMOL/L — SIGNIFICANT CHANGE UP (ref 3.5–5.3)
POTASSIUM SERPL-SCNC: 5.5 MMOL/L — HIGH (ref 3.5–5.3)
POTASSIUM SERPL-SCNC: 6.9 MMOL/L — CRITICAL HIGH (ref 3.5–5.3)
PROT SERPL-MCNC: 5.6 G/DL — LOW (ref 6–8.3)
RBC # BLD: 2.25 M/UL — LOW (ref 4.2–5.8)
RBC # FLD: 15.4 % — HIGH (ref 10.3–14.5)
RETICS #: 244.8 10X3/UL — HIGH (ref 17–73)
RETICS/RBC NFR: 10.8 % — HIGH (ref 0.5–2.5)
SODIUM SERPL-SCNC: 137 MMOL/L — SIGNIFICANT CHANGE UP (ref 135–145)
SODIUM SERPL-SCNC: 139 MMOL/L — SIGNIFICANT CHANGE UP (ref 135–145)
URATE SERPL-MCNC: 3.2 MG/DL — LOW (ref 3.4–8.8)
VARIANT LYMPHS # BLD: 8 % — SIGNIFICANT CHANGE UP
WBC # BLD: 208.56 K/UL — CRITICAL HIGH (ref 3.8–10.5)
WBC # FLD AUTO: 208.56 K/UL — CRITICAL HIGH (ref 3.8–10.5)

## 2017-01-29 PROCEDURE — 99233 SBSQ HOSP IP/OBS HIGH 50: CPT | Mod: GC

## 2017-01-29 PROCEDURE — 74177 CT ABD & PELVIS W/CONTRAST: CPT | Mod: 26

## 2017-01-29 PROCEDURE — 85060 BLOOD SMEAR INTERPRETATION: CPT

## 2017-01-29 PROCEDURE — 71260 CT THORAX DX C+: CPT | Mod: 26

## 2017-01-29 PROCEDURE — 99222 1ST HOSP IP/OBS MODERATE 55: CPT | Mod: GC

## 2017-01-29 PROCEDURE — 93010 ELECTROCARDIOGRAM REPORT: CPT

## 2017-01-29 RX ORDER — CALCIUM GLUCONATE 100 MG/ML
2 VIAL (ML) INTRAVENOUS ONCE
Qty: 0 | Refills: 0 | Status: DISCONTINUED | OUTPATIENT
Start: 2017-01-29 | End: 2017-01-29

## 2017-01-29 RX ORDER — DEXTROSE 50 % IN WATER 50 %
50 SYRINGE (ML) INTRAVENOUS ONCE
Qty: 0 | Refills: 0 | Status: COMPLETED | OUTPATIENT
Start: 2017-01-29 | End: 2017-01-29

## 2017-01-29 RX ORDER — PIPERACILLIN AND TAZOBACTAM 4; .5 G/20ML; G/20ML
3.38 INJECTION, POWDER, LYOPHILIZED, FOR SOLUTION INTRAVENOUS ONCE
Qty: 0 | Refills: 0 | Status: COMPLETED | OUTPATIENT
Start: 2017-01-29 | End: 2017-01-29

## 2017-01-29 RX ORDER — PIPERACILLIN AND TAZOBACTAM 4; .5 G/20ML; G/20ML
3.38 INJECTION, POWDER, LYOPHILIZED, FOR SOLUTION INTRAVENOUS EVERY 8 HOURS
Qty: 0 | Refills: 0 | Status: DISCONTINUED | OUTPATIENT
Start: 2017-01-29 | End: 2017-02-06

## 2017-01-29 RX ORDER — CALCIUM GLUCONATE 100 MG/ML
1 VIAL (ML) INTRAVENOUS ONCE
Qty: 0 | Refills: 0 | Status: COMPLETED | OUTPATIENT
Start: 2017-01-29 | End: 2017-01-29

## 2017-01-29 RX ORDER — SODIUM CHLORIDE 9 MG/ML
1000 INJECTION INTRAMUSCULAR; INTRAVENOUS; SUBCUTANEOUS
Qty: 0 | Refills: 0 | Status: DISCONTINUED | OUTPATIENT
Start: 2017-01-29 | End: 2017-02-01

## 2017-01-29 RX ORDER — INSULIN HUMAN 100 [IU]/ML
10 INJECTION, SOLUTION SUBCUTANEOUS ONCE
Qty: 0 | Refills: 0 | Status: COMPLETED | OUTPATIENT
Start: 2017-01-29 | End: 2017-01-29

## 2017-01-29 RX ADMIN — SODIUM CHLORIDE 100 MILLILITER(S): 9 INJECTION INTRAMUSCULAR; INTRAVENOUS; SUBCUTANEOUS at 09:59

## 2017-01-29 RX ADMIN — PIPERACILLIN AND TAZOBACTAM 25 GRAM(S): 4; .5 INJECTION, POWDER, LYOPHILIZED, FOR SOLUTION INTRAVENOUS at 13:49

## 2017-01-29 RX ADMIN — ATORVASTATIN CALCIUM 20 MILLIGRAM(S): 80 TABLET, FILM COATED ORAL at 21:36

## 2017-01-29 RX ADMIN — ENOXAPARIN SODIUM 40 MILLIGRAM(S): 100 INJECTION SUBCUTANEOUS at 06:49

## 2017-01-29 RX ADMIN — FINASTERIDE 5 MILLIGRAM(S): 5 TABLET, FILM COATED ORAL at 11:05

## 2017-01-29 RX ADMIN — Medication 300 MILLIGRAM(S): at 11:05

## 2017-01-29 RX ADMIN — INSULIN HUMAN 10 UNIT(S): 100 INJECTION, SOLUTION SUBCUTANEOUS at 11:00

## 2017-01-29 RX ADMIN — Medication 3 MILLILITER(S): at 09:25

## 2017-01-29 RX ADMIN — Medication 100 GRAM(S): at 09:59

## 2017-01-29 RX ADMIN — PIPERACILLIN AND TAZOBACTAM 25 GRAM(S): 4; .5 INJECTION, POWDER, LYOPHILIZED, FOR SOLUTION INTRAVENOUS at 21:37

## 2017-01-29 RX ADMIN — PIPERACILLIN AND TAZOBACTAM 200 GRAM(S): 4; .5 INJECTION, POWDER, LYOPHILIZED, FOR SOLUTION INTRAVENOUS at 13:12

## 2017-01-29 RX ADMIN — Medication 100 MICROGRAM(S): at 06:50

## 2017-01-29 RX ADMIN — Medication 50 MILLILITER(S): at 10:53

## 2017-01-29 RX ADMIN — AMLODIPINE BESYLATE 10 MILLIGRAM(S): 2.5 TABLET ORAL at 06:50

## 2017-01-30 LAB
-  AMIKACIN: SIGNIFICANT CHANGE UP
-  AMPICILLIN/SULBACTAM: SIGNIFICANT CHANGE UP
-  AMPICILLIN: SIGNIFICANT CHANGE UP
-  AZTREONAM: SIGNIFICANT CHANGE UP
-  CEFAZOLIN: SIGNIFICANT CHANGE UP
-  CEFEPIME: SIGNIFICANT CHANGE UP
-  CEFOXITIN: SIGNIFICANT CHANGE UP
-  CEFTAZIDIME: SIGNIFICANT CHANGE UP
-  CEFTRIAXONE: SIGNIFICANT CHANGE UP
-  CIPROFLOXACIN: SIGNIFICANT CHANGE UP
-  ERTAPENEM: SIGNIFICANT CHANGE UP
-  GENTAMICIN: SIGNIFICANT CHANGE UP
-  IMIPENEM: SIGNIFICANT CHANGE UP
-  LEVOFLOXACIN: SIGNIFICANT CHANGE UP
-  MEROPENEM: SIGNIFICANT CHANGE UP
-  PIPERACILLIN/TAZOBACTAM: SIGNIFICANT CHANGE UP
-  TIGECYCLINE: SIGNIFICANT CHANGE UP
-  TOBRAMYCIN: SIGNIFICANT CHANGE UP
-  TRIMETHOPRIM/SULFAMETHOXAZOLE: SIGNIFICANT CHANGE UP
ANISOCYTOSIS BLD QL: SLIGHT — SIGNIFICANT CHANGE UP
BACTERIA FLD CULT: SIGNIFICANT CHANGE UP
BASOPHILS NFR SPEC: 0.9 % — SIGNIFICANT CHANGE UP (ref 0–2)
BLASTS # FLD: 0 % — SIGNIFICANT CHANGE UP (ref 0–0)
BLD GP AB SCN SERPL QL: POSITIVE — SIGNIFICANT CHANGE UP
BUN SERPL-MCNC: 13 MG/DL — SIGNIFICANT CHANGE UP (ref 7–23)
CALCIUM SERPL-MCNC: 8 MG/DL — LOW (ref 8.4–10.5)
CHLORIDE SERPL-SCNC: 101 MMOL/L — SIGNIFICANT CHANGE UP (ref 98–107)
CO2 SERPL-SCNC: 20 MMOL/L — LOW (ref 22–31)
CREAT SERPL-MCNC: 1.05 MG/DL — SIGNIFICANT CHANGE UP (ref 0.5–1.3)
DACRYOCYTES BLD QL SMEAR: SLIGHT — SIGNIFICANT CHANGE UP
EOSINOPHIL NFR FLD: 0 % — SIGNIFICANT CHANGE UP (ref 0–6)
GLUCOSE SERPL-MCNC: 106 MG/DL — HIGH (ref 70–99)
GRAM STN FLD: SIGNIFICANT CHANGE UP
HAPTOGLOB SERPL-MCNC: 212 MG/DL — HIGH (ref 34–200)
HCT VFR BLD CALC: 20.9 % — CRITICAL LOW (ref 39–50)
HCT VFR BLD CALC: 21.3 % — LOW (ref 39–50)
HCT VFR BLD CALC: 23.7 % — LOW (ref 39–50)
HGB BLD-MCNC: 6.2 G/DL — CRITICAL LOW (ref 13–17)
HGB BLD-MCNC: 6.6 G/DL — CRITICAL LOW (ref 13–17)
HGB BLD-MCNC: 6.7 G/DL — CRITICAL LOW (ref 13–17)
INR BLD: 1.18 — SIGNIFICANT CHANGE UP (ref 0.87–1.18)
LDH SERPL L TO P-CCNC: 241 U/L — HIGH (ref 135–225)
LYMPHOCYTES NFR SPEC AUTO: 83.5 % — HIGH (ref 13–44)
MACROCYTES BLD QL: SLIGHT — SIGNIFICANT CHANGE UP
MAGNESIUM SERPL-MCNC: 1.6 MG/DL — SIGNIFICANT CHANGE UP (ref 1.6–2.6)
MCHC RBC-ENTMCNC: 28.3 % — LOW (ref 32–36)
MCHC RBC-ENTMCNC: 29.1 % — LOW (ref 32–36)
MCHC RBC-ENTMCNC: 31.5 PG — SIGNIFICANT CHANGE UP (ref 27–34)
MCHC RBC-ENTMCNC: 31.6 % — LOW (ref 32–36)
MCHC RBC-ENTMCNC: 31.8 PG — SIGNIFICANT CHANGE UP (ref 27–34)
MCHC RBC-ENTMCNC: 33.7 PG — SIGNIFICANT CHANGE UP (ref 27–34)
MCV RBC AUTO: 106.6 FL — HIGH (ref 80–100)
MCV RBC AUTO: 109.2 FL — HIGH (ref 80–100)
MCV RBC AUTO: 111.3 FL — HIGH (ref 80–100)
METAMYELOCYTES # FLD: 0 % — SIGNIFICANT CHANGE UP (ref 0–1)
METHOD TYPE: SIGNIFICANT CHANGE UP
MONOCYTES NFR BLD: 6.4 % — SIGNIFICANT CHANGE UP (ref 2–9)
MYELOCYTES NFR BLD: 0 % — SIGNIFICANT CHANGE UP (ref 0–0)
NEUTROPHIL AB SER-ACNC: 7.3 % — LOW (ref 43–77)
NEUTS BAND # BLD: 0 % — SIGNIFICANT CHANGE UP (ref 0–6)
ORGANISM # SPEC MICROSCOPIC CNT: SIGNIFICANT CHANGE UP
ORGANISM # SPEC MICROSCOPIC CNT: SIGNIFICANT CHANGE UP
OTHER - HEMATOLOGY %: 0 — SIGNIFICANT CHANGE UP
PHOSPHATE SERPL-MCNC: 2.6 MG/DL — SIGNIFICANT CHANGE UP (ref 2.5–4.5)
PLATELET # BLD AUTO: 130 K/UL — LOW (ref 150–400)
PLATELET # BLD AUTO: 142 K/UL — LOW (ref 150–400)
PLATELET # BLD AUTO: 152 K/UL — SIGNIFICANT CHANGE UP (ref 150–400)
PLATELET COUNT - ESTIMATE: NORMAL — SIGNIFICANT CHANGE UP
PMV BLD: 8.7 FL — SIGNIFICANT CHANGE UP (ref 7–13)
PMV BLD: 8.7 FL — SIGNIFICANT CHANGE UP (ref 7–13)
PMV BLD: 8.9 FL — SIGNIFICANT CHANGE UP (ref 7–13)
POLYCHROMASIA BLD QL SMEAR: SLIGHT — SIGNIFICANT CHANGE UP
POTASSIUM SERPL-MCNC: 4.2 MMOL/L — SIGNIFICANT CHANGE UP (ref 3.5–5.3)
POTASSIUM SERPL-SCNC: 4.2 MMOL/L — SIGNIFICANT CHANGE UP (ref 3.5–5.3)
PROMYELOCYTES # FLD: 0 % — SIGNIFICANT CHANGE UP (ref 0–0)
PROTHROM AB SERPL-ACNC: 13.5 SEC — HIGH (ref 10–13.1)
RBC # BLD: 1.95 M/UL — LOW (ref 4.2–5.8)
RBC # BLD: 1.96 M/UL — LOW (ref 4.2–5.8)
RBC # BLD: 2.13 M/UL — LOW (ref 4.2–5.8)
RBC # FLD: 15.5 % — HIGH (ref 10.3–14.5)
RBC # FLD: 15.7 % — HIGH (ref 10.3–14.5)
RBC # FLD: 16 % — HIGH (ref 10.3–14.5)
RH IG SCN BLD-IMP: POSITIVE — SIGNIFICANT CHANGE UP
SODIUM SERPL-SCNC: 137 MMOL/L — SIGNIFICANT CHANGE UP (ref 135–145)
SPECIMEN SOURCE: SIGNIFICANT CHANGE UP
TM INTERPRETATION: SIGNIFICANT CHANGE UP
URATE SERPL-MCNC: 2.3 MG/DL — LOW (ref 3.4–8.8)
VARIANT LYMPHS # BLD: 1.8 % — SIGNIFICANT CHANGE UP
WBC # BLD: 121.41 K/UL — CRITICAL HIGH (ref 3.8–10.5)
WBC # BLD: 144.39 K/UL — CRITICAL HIGH (ref 3.8–10.5)
WBC # FLD AUTO: 121.41 K/UL — CRITICAL HIGH (ref 3.8–10.5)
WBC # FLD AUTO: 144.39 K/UL — CRITICAL HIGH (ref 3.8–10.5)

## 2017-01-30 PROCEDURE — 32551 INSERTION OF CHEST TUBE: CPT | Mod: GC

## 2017-01-30 PROCEDURE — 71010: CPT | Mod: 26

## 2017-01-30 PROCEDURE — 99233 SBSQ HOSP IP/OBS HIGH 50: CPT | Mod: GC

## 2017-01-30 PROCEDURE — 99232 SBSQ HOSP IP/OBS MODERATE 35: CPT | Mod: 25,GC

## 2017-01-30 RX ADMIN — Medication 100 MICROGRAM(S): at 05:49

## 2017-01-30 RX ADMIN — PIPERACILLIN AND TAZOBACTAM 25 GRAM(S): 4; .5 INJECTION, POWDER, LYOPHILIZED, FOR SOLUTION INTRAVENOUS at 05:50

## 2017-01-30 RX ADMIN — ENOXAPARIN SODIUM 40 MILLIGRAM(S): 100 INJECTION SUBCUTANEOUS at 05:49

## 2017-01-30 RX ADMIN — Medication 300 MILLIGRAM(S): at 12:09

## 2017-01-30 RX ADMIN — AMLODIPINE BESYLATE 10 MILLIGRAM(S): 2.5 TABLET ORAL at 05:49

## 2017-01-30 RX ADMIN — FINASTERIDE 5 MILLIGRAM(S): 5 TABLET, FILM COATED ORAL at 12:09

## 2017-01-30 RX ADMIN — ATORVASTATIN CALCIUM 20 MILLIGRAM(S): 80 TABLET, FILM COATED ORAL at 22:21

## 2017-01-30 RX ADMIN — PIPERACILLIN AND TAZOBACTAM 25 GRAM(S): 4; .5 INJECTION, POWDER, LYOPHILIZED, FOR SOLUTION INTRAVENOUS at 12:09

## 2017-01-30 RX ADMIN — PIPERACILLIN AND TAZOBACTAM 25 GRAM(S): 4; .5 INJECTION, POWDER, LYOPHILIZED, FOR SOLUTION INTRAVENOUS at 22:27

## 2017-01-31 LAB
BASOPHILS # BLD AUTO: 0.4 K/UL — HIGH (ref 0–0.2)
BASOPHILS NFR BLD AUTO: 0.2 % — SIGNIFICANT CHANGE UP (ref 0–2)
BUN SERPL-MCNC: 9 MG/DL — SIGNIFICANT CHANGE UP (ref 7–23)
CALCIUM SERPL-MCNC: 8.4 MG/DL — SIGNIFICANT CHANGE UP (ref 8.4–10.5)
CHLORIDE SERPL-SCNC: 100 MMOL/L — SIGNIFICANT CHANGE UP (ref 98–107)
CO2 SERPL-SCNC: 21 MMOL/L — LOW (ref 22–31)
CREAT SERPL-MCNC: 1.22 MG/DL — SIGNIFICANT CHANGE UP (ref 0.5–1.3)
EOSINOPHIL # BLD AUTO: 0.25 K/UL — SIGNIFICANT CHANGE UP (ref 0–0.5)
EOSINOPHIL NFR BLD AUTO: 0.1 % — SIGNIFICANT CHANGE UP (ref 0–6)
GLUCOSE SERPL-MCNC: 101 MG/DL — HIGH (ref 70–99)
GRAM STN FLD: SIGNIFICANT CHANGE UP
HAPTOGLOB SERPL-MCNC: 231 MG/DL — HIGH (ref 34–200)
HCT VFR BLD CALC: 23 % — LOW (ref 39–50)
HGB BLD-MCNC: 6.9 G/DL — CRITICAL LOW (ref 13–17)
IMM GRANULOCYTES NFR BLD AUTO: 0.3 % — SIGNIFICANT CHANGE UP (ref 0–1.5)
LDH SERPL L TO P-CCNC: 251 U/L — HIGH (ref 135–225)
LYMPHOCYTES # BLD AUTO: 168.76 K/UL — HIGH (ref 1–3.3)
LYMPHOCYTES # BLD AUTO: 92.9 % — HIGH (ref 13–44)
MAGNESIUM SERPL-MCNC: 1.5 MG/DL — LOW (ref 1.6–2.6)
MANUAL SMEAR VERIFICATION: SIGNIFICANT CHANGE UP
MCHC RBC-ENTMCNC: 30 % — LOW (ref 32–36)
MCHC RBC-ENTMCNC: 32.5 PG — SIGNIFICANT CHANGE UP (ref 27–34)
MCV RBC AUTO: 108.5 FL — HIGH (ref 80–100)
MONOCYTES # BLD AUTO: 3.9 K/UL — HIGH (ref 0–0.9)
MONOCYTES NFR BLD AUTO: 2.1 % — SIGNIFICANT CHANGE UP (ref 2–14)
NEUTROPHILS # BLD AUTO: 7.83 K/UL — HIGH (ref 1.8–7.4)
NEUTROPHILS NFR BLD AUTO: 4.4 % — LOW (ref 43–77)
PHOSPHATE SERPL-MCNC: 3.1 MG/DL — SIGNIFICANT CHANGE UP (ref 2.5–4.5)
PLATELET # BLD AUTO: 178 K/UL — SIGNIFICANT CHANGE UP (ref 150–400)
PMV BLD: 9.2 FL — SIGNIFICANT CHANGE UP (ref 7–13)
POTASSIUM SERPL-MCNC: 4.8 MMOL/L — SIGNIFICANT CHANGE UP (ref 3.5–5.3)
POTASSIUM SERPL-SCNC: 4.8 MMOL/L — SIGNIFICANT CHANGE UP (ref 3.5–5.3)
RBC # BLD: 2.12 M/UL — LOW (ref 4.2–5.8)
RBC # FLD: 16 % — HIGH (ref 10.3–14.5)
SODIUM SERPL-SCNC: 139 MMOL/L — SIGNIFICANT CHANGE UP (ref 135–145)
SPECIMEN SOURCE: SIGNIFICANT CHANGE UP
SPECIMEN SOURCE: SIGNIFICANT CHANGE UP
URATE SERPL-MCNC: 2.3 MG/DL — LOW (ref 3.4–8.8)
WBC # BLD: 181.65 K/UL — CRITICAL HIGH (ref 3.8–10.5)
WBC # FLD AUTO: 181.65 K/UL — CRITICAL HIGH (ref 3.8–10.5)

## 2017-01-31 PROCEDURE — 99232 SBSQ HOSP IP/OBS MODERATE 35: CPT | Mod: GC

## 2017-01-31 PROCEDURE — 99233 SBSQ HOSP IP/OBS HIGH 50: CPT | Mod: GC

## 2017-01-31 PROCEDURE — 99232 SBSQ HOSP IP/OBS MODERATE 35: CPT

## 2017-01-31 RX ORDER — MAGNESIUM SULFATE 500 MG/ML
1 VIAL (ML) INJECTION ONCE
Qty: 0 | Refills: 0 | Status: COMPLETED | OUTPATIENT
Start: 2017-01-31 | End: 2017-01-31

## 2017-01-31 RX ADMIN — ENOXAPARIN SODIUM 40 MILLIGRAM(S): 100 INJECTION SUBCUTANEOUS at 06:19

## 2017-01-31 RX ADMIN — ATORVASTATIN CALCIUM 20 MILLIGRAM(S): 80 TABLET, FILM COATED ORAL at 22:23

## 2017-01-31 RX ADMIN — Medication 300 MILLIGRAM(S): at 12:40

## 2017-01-31 RX ADMIN — PIPERACILLIN AND TAZOBACTAM 25 GRAM(S): 4; .5 INJECTION, POWDER, LYOPHILIZED, FOR SOLUTION INTRAVENOUS at 15:02

## 2017-01-31 RX ADMIN — FINASTERIDE 5 MILLIGRAM(S): 5 TABLET, FILM COATED ORAL at 12:40

## 2017-01-31 RX ADMIN — Medication 100 GRAM(S): at 12:39

## 2017-01-31 RX ADMIN — PIPERACILLIN AND TAZOBACTAM 25 GRAM(S): 4; .5 INJECTION, POWDER, LYOPHILIZED, FOR SOLUTION INTRAVENOUS at 22:23

## 2017-01-31 RX ADMIN — PIPERACILLIN AND TAZOBACTAM 25 GRAM(S): 4; .5 INJECTION, POWDER, LYOPHILIZED, FOR SOLUTION INTRAVENOUS at 06:19

## 2017-01-31 RX ADMIN — AMLODIPINE BESYLATE 10 MILLIGRAM(S): 2.5 TABLET ORAL at 06:20

## 2017-01-31 RX ADMIN — Medication 100 MICROGRAM(S): at 06:20

## 2017-02-01 ENCOUNTER — TRANSCRIPTION ENCOUNTER (OUTPATIENT)
Age: 82
End: 2017-02-01

## 2017-02-01 LAB
BASOPHILS # BLD AUTO: 0.32 K/UL — HIGH (ref 0–0.2)
BASOPHILS # BLD AUTO: 0.5 K/UL — HIGH (ref 0–0.2)
BASOPHILS NFR BLD AUTO: 0.2 % — SIGNIFICANT CHANGE UP (ref 0–2)
BASOPHILS NFR BLD AUTO: 0.4 % — SIGNIFICANT CHANGE UP (ref 0–2)
BUN SERPL-MCNC: 11 MG/DL — SIGNIFICANT CHANGE UP (ref 7–23)
CALCIUM SERPL-MCNC: 8.4 MG/DL — SIGNIFICANT CHANGE UP (ref 8.4–10.5)
CHLORIDE SERPL-SCNC: 100 MMOL/L — SIGNIFICANT CHANGE UP (ref 98–107)
CO2 SERPL-SCNC: 21 MMOL/L — LOW (ref 22–31)
CREAT SERPL-MCNC: 1.09 MG/DL — SIGNIFICANT CHANGE UP (ref 0.5–1.3)
EOSINOPHIL # BLD AUTO: 0.14 K/UL — SIGNIFICANT CHANGE UP (ref 0–0.5)
EOSINOPHIL # BLD AUTO: 0.17 K/UL — SIGNIFICANT CHANGE UP (ref 0–0.5)
EOSINOPHIL NFR BLD AUTO: 0.1 % — SIGNIFICANT CHANGE UP (ref 0–6)
EOSINOPHIL NFR BLD AUTO: 0.1 % — SIGNIFICANT CHANGE UP (ref 0–6)
GLUCOSE SERPL-MCNC: 116 MG/DL — HIGH (ref 70–99)
HCT VFR BLD CALC: 29.1 % — LOW (ref 39–50)
HCT VFR BLD CALC: 29.8 % — LOW (ref 39–50)
HGB BLD-MCNC: 8.8 G/DL — LOW (ref 13–17)
HGB BLD-MCNC: 9 G/DL — LOW (ref 13–17)
IMM GRANULOCYTES NFR BLD AUTO: 0.3 % — SIGNIFICANT CHANGE UP (ref 0–1.5)
IMM GRANULOCYTES NFR BLD AUTO: 0.4 % — SIGNIFICANT CHANGE UP (ref 0–1.5)
INR BLD: 1.23 — HIGH (ref 0.87–1.18)
LYMPHOCYTES # BLD AUTO: 130.36 K/UL — HIGH (ref 1–3.3)
LYMPHOCYTES # BLD AUTO: 133.33 K/UL — HIGH (ref 1–3.3)
LYMPHOCYTES # BLD AUTO: 92.4 % — HIGH (ref 13–44)
LYMPHOCYTES # BLD AUTO: 92.8 % — HIGH (ref 13–44)
MAGNESIUM SERPL-MCNC: 1.8 MG/DL — SIGNIFICANT CHANGE UP (ref 1.6–2.6)
MANUAL SMEAR VERIFICATION: SIGNIFICANT CHANGE UP
MANUAL SMEAR VERIFICATION: SIGNIFICANT CHANGE UP
MCHC RBC-ENTMCNC: 30.2 % — LOW (ref 32–36)
MCHC RBC-ENTMCNC: 30.2 % — LOW (ref 32–36)
MCHC RBC-ENTMCNC: 30.4 PG — SIGNIFICANT CHANGE UP (ref 27–34)
MCHC RBC-ENTMCNC: 30.5 PG — SIGNIFICANT CHANGE UP (ref 27–34)
MCV RBC AUTO: 100.7 FL — HIGH (ref 80–100)
MCV RBC AUTO: 101 FL — HIGH (ref 80–100)
MONOCYTES # BLD AUTO: 1.42 K/UL — HIGH (ref 0–0.9)
MONOCYTES # BLD AUTO: 1.77 K/UL — HIGH (ref 0–0.9)
MONOCYTES NFR BLD AUTO: 1 % — LOW (ref 2–14)
MONOCYTES NFR BLD AUTO: 1.3 % — LOW (ref 2–14)
NEUTROPHILS # BLD AUTO: 7.83 K/UL — HIGH (ref 1.8–7.4)
NEUTROPHILS # BLD AUTO: 7.97 K/UL — HIGH (ref 1.8–7.4)
NEUTROPHILS NFR BLD AUTO: 5.5 % — LOW (ref 43–77)
NEUTROPHILS NFR BLD AUTO: 5.5 % — LOW (ref 43–77)
PHOSPHATE SERPL-MCNC: 2.9 MG/DL — SIGNIFICANT CHANGE UP (ref 2.5–4.5)
PLATELET # BLD AUTO: 118 K/UL — LOW (ref 150–400)
PLATELET # BLD AUTO: 126 K/UL — LOW (ref 150–400)
PMV BLD: 9.1 FL — SIGNIFICANT CHANGE UP (ref 7–13)
PMV BLD: 9.2 FL — SIGNIFICANT CHANGE UP (ref 7–13)
POTASSIUM SERPL-MCNC: 4.3 MMOL/L — SIGNIFICANT CHANGE UP (ref 3.5–5.3)
POTASSIUM SERPL-SCNC: 4.3 MMOL/L — SIGNIFICANT CHANGE UP (ref 3.5–5.3)
PROTHROM AB SERPL-ACNC: 14.1 SEC — HIGH (ref 10–13.1)
RBC # BLD: 2.89 M/UL — LOW (ref 4.2–5.8)
RBC # BLD: 2.95 M/UL — LOW (ref 4.2–5.8)
RBC # FLD: 19.9 % — HIGH (ref 10.3–14.5)
RBC # FLD: 20 % — HIGH (ref 10.3–14.5)
SODIUM SERPL-SCNC: 137 MMOL/L — SIGNIFICANT CHANGE UP (ref 135–145)
WBC # BLD: 141.08 K/UL — CRITICAL HIGH (ref 3.8–10.5)
WBC # BLD: 143.75 K/UL — CRITICAL HIGH (ref 3.8–10.5)
WBC # FLD AUTO: 141.08 K/UL — CRITICAL HIGH (ref 3.8–10.5)
WBC # FLD AUTO: 143.75 K/UL — CRITICAL HIGH (ref 3.8–10.5)

## 2017-02-01 PROCEDURE — 71010: CPT | Mod: 26

## 2017-02-01 PROCEDURE — 99233 SBSQ HOSP IP/OBS HIGH 50: CPT | Mod: GC

## 2017-02-01 PROCEDURE — 99232 SBSQ HOSP IP/OBS MODERATE 35: CPT | Mod: GC

## 2017-02-01 RX ORDER — ACETAMINOPHEN 500 MG
650 TABLET ORAL EVERY 6 HOURS
Qty: 0 | Refills: 0 | Status: DISCONTINUED | OUTPATIENT
Start: 2017-02-01 | End: 2017-02-06

## 2017-02-01 RX ORDER — OXYCODONE HYDROCHLORIDE 5 MG/1
5 TABLET ORAL EVERY 6 HOURS
Qty: 0 | Refills: 0 | Status: DISCONTINUED | OUTPATIENT
Start: 2017-02-01 | End: 2017-02-06

## 2017-02-01 RX ADMIN — PIPERACILLIN AND TAZOBACTAM 25 GRAM(S): 4; .5 INJECTION, POWDER, LYOPHILIZED, FOR SOLUTION INTRAVENOUS at 23:49

## 2017-02-01 RX ADMIN — PIPERACILLIN AND TAZOBACTAM 25 GRAM(S): 4; .5 INJECTION, POWDER, LYOPHILIZED, FOR SOLUTION INTRAVENOUS at 06:10

## 2017-02-01 RX ADMIN — ATORVASTATIN CALCIUM 20 MILLIGRAM(S): 80 TABLET, FILM COATED ORAL at 23:49

## 2017-02-01 RX ADMIN — ENOXAPARIN SODIUM 40 MILLIGRAM(S): 100 INJECTION SUBCUTANEOUS at 06:10

## 2017-02-01 RX ADMIN — PIPERACILLIN AND TAZOBACTAM 25 GRAM(S): 4; .5 INJECTION, POWDER, LYOPHILIZED, FOR SOLUTION INTRAVENOUS at 14:30

## 2017-02-01 RX ADMIN — OXYCODONE HYDROCHLORIDE 5 MILLIGRAM(S): 5 TABLET ORAL at 10:25

## 2017-02-01 RX ADMIN — Medication 100 MICROGRAM(S): at 06:10

## 2017-02-01 RX ADMIN — AMLODIPINE BESYLATE 10 MILLIGRAM(S): 2.5 TABLET ORAL at 06:10

## 2017-02-01 RX ADMIN — FINASTERIDE 5 MILLIGRAM(S): 5 TABLET, FILM COATED ORAL at 14:29

## 2017-02-01 RX ADMIN — Medication 300 MILLIGRAM(S): at 14:29

## 2017-02-01 NOTE — DISCHARGE NOTE ADULT - MEDICATION SUMMARY - MEDICATIONS TO TAKE
I will START or STAY ON the medications listed below when I get home from the hospital:    Avodart 0.5 mg oral capsule  -- 1 cap(s) by mouth once a day  -- Indication: For bph    allopurinol 300 mg oral tablet  -- 1 tab(s) by mouth once a day  -- Indication: For gout    Lipitor 20 mg oral tablet  -- 1 tab(s) by mouth once a day (at bedtime)  -- Indication: For Hypercholesterolemia    Stiolto Respimat 2.5 mcg-2.5 mcg inhalation aerosol  -- 2 puff(s) inhaled every 24 hours  -- Indication: For Pleural effusion    Norvasc 10 mg oral tablet  -- 1 tab(s) by mouth once a day  -- Indication: For Hypertension    Levaquin 500 mg oral tablet  -- 1 tab(s) by mouth every 24 hours  -- Avoid prolonged or excessive exposure to direct and/or artificial sunlight while taking this medication.  Do not take dairy products, antacids, or iron preparations within one hour of this medication.  Finish all this medication unless otherwise directed by prescriber.  May cause drowsiness or dizziness.  Medication should be taken with plenty of water.    -- Indication: For Pleural effusion    Synthroid 100 mcg (0.1 mg) oral tablet  -- 1 tab(s) by mouth once a day  -- Indication: For Hypothyroidism

## 2017-02-01 NOTE — DISCHARGE NOTE ADULT - CARE PROVIDER_API CALL
Lux Mathew), Hematology; Internal Medicine; Medical Oncology  [address] [place] [address]  Phone: [phone]  Fax: [phone]    Anthony Smith), Internal Medicine  [address] [place] [id number]  Phone: [phone]  Fax: [phone]

## 2017-02-01 NOTE — DISCHARGE NOTE ADULT - HOSPITAL COURSE
86M hx CLL s/p chemo (RCD regimen), HTN, HLD, hypothyroidism, BPH, p/w progressive SOB & cough. Pt reports SOB on exertion & productive cough x 6 weeks.  He came to ED b/c the SOB was worsening & he began to have orthopnea  He had an evaluation by a pulmonologist a few days ago & was started on an inhaler w/out any improvement in the SOB. He denies fevers, chills, CP, abd pain, N, V, changes in urinary or bowel patterns.  He has had CLL for many years & started chemo about 3 years ago.  He completed a course of rituximab, cyclophosphamide dexamethasone in 1/2016.  He had AIHA a few years ago that is now resolved.     ED course:  T 97.8, , /74, RR 20, O2 94% RA.  Labs remarkable for , lymphocyte predominant, K 5.4.  Bedside US in ED revealed large R-simple pleural effusion. He underwent thoracentesis w/ drainage of 2.2L.  O2 improved to 97% on RA.    Pt was admitted to the medicine floor. Pulmonary was consulted for pt's pleural effusion but no intervention was recommended because it was pt's first episode of a pleural effusion; it was recommended to watch the pt for reaccumulation of fluid. Culture of the pleural fluid eventually grew Pseudomonas luteola. Zosyn was started empirically and ID was consulted. Hematology was also consulted for pt's elevated WBC counts over 200 on admission; pt appeared to have relapsed CLL. Treatment was put on hold until pt's empyema/parapneumonic effusion resolved. A CT chest/abd/pel was done to check on progression of pt's CLL; it showed Moderate right pleural effusion which contains foci of air, likely secondary to recent intervention.Splenomegaly. Diffuse mediastinal and retroperitoneal lymphadenopathy, slightly worsened compared to 1/28/2016. Extensive tree in bud opacities consistent with small airways infection/mucoid impaction similar to the limited views of the prior examination. A chest tube was placed by pulmonary for reaccumulation of pleural effusion. Pt's Hgb dropped to 6.2 and as per hematology recs he was ordered for 2u PRBCs. A Direct Shania test returned positive for warm antibodies and pt's blood transfusion was delayed until the following day when there were more staff members present to observe pt for any transfusion reactions. Pt was transfused with the least antigenic blood without any complications or pre-treatment. Hgb afterward improved to 9.0. The following day pt was to undergo the MISTE protocol, however it was noticed that the pt's chest tube became dislodged. Pt was transferred to the RCU and a new chest tube was placed. 86M hx CLL s/p chemo (RCD regimen), HTN, HLD, hypothyroidism, BPH, p/w progressive SOB & cough. Pt reports SOB on exertion & productive cough x 6 weeks.  He came to ED b/c the SOB was worsening & he began to have orthopnea  He had an evaluation by a pulmonologist a few days ago & was started on an inhaler w/out any improvement in the SOB. He denies fevers, chills, CP, abd pain, N, V, changes in urinary or bowel patterns.  He has had CLL for many years & started chemo about 3 years ago.  He completed a course of rituximab, cyclophosphamide dexamethasone in 1/2016.  He had AIHA a few years ago that is now resolved.     ED course:  T 97.8, , /74, RR 20, O2 94% RA.  Labs remarkable for , lymphocyte predominant, K 5.4.  Bedside US in ED revealed large R-simple pleural effusion. He underwent thoracentesis w/ drainage of 2.2L.  O2 improved to 97% on RA.    Pt was admitted to the medicine floor. Pulmonary was consulted for pt's pleural effusion but no intervention was recommended because it was pt's first episode of a pleural effusion; it was recommended to watch the pt for reaccumulation of fluid. Culture of the pleural fluid eventually grew Pseudomonas luteola. Zosyn was started empirically and ID was consulted. Hematology was also consulted for pt's elevated WBC counts over 200 on admission; pt appeared to have relapsed CLL. Treatment was put on hold until pt's empyema/parapneumonic effusion resolved. A CT chest/abd/pel was done to check on progression of pt's CLL; it showed Moderate right pleural effusion which contains foci of air, likely secondary to recent intervention.Splenomegaly. Diffuse mediastinal and retroperitoneal lymphadenopathy, slightly worsened compared to 1/28/2016. Extensive tree in bud opacities consistent with small airways infection/mucoid impaction similar to the limited views of the prior examination. A chest tube was placed by pulmonary for reaccumulation of pleural effusion. Pt's Hgb dropped to 6.2 and as per hematology recs he was ordered for 2u PRBCs. A Direct Shania test returned positive for warm antibodies and pt's blood transfusion was delayed until the following day when there were more staff members present to observe pt for any transfusion reactions. Pt was transfused with the least antigenic blood without any complications or pre-treatment. Hgb afterward improved to 9.0. The following day pt was to undergo the MISTE protocol, however it was noticed that the pt's chest tube became dislodged. Pt was transferred to the RCU.  Bedside ultrasound shows smaller effusion, not complex, chest tube not replaced.  Pt underwent serial ultrasounds showing improvement.  Pt continued to improve.      PT recs outpatient PT

## 2017-02-01 NOTE — DISCHARGE NOTE ADULT - MEDICATION SUMMARY - MEDICATIONS TO STOP TAKING
I will STOP taking the medications listed below when I get home from the hospital:    ciprofloxacin 500 mg oral tablet  -- 1 tab(s) by mouth every 12 hours    Flagyl 500 mg oral tablet  -- 1 tab(s) by mouth 2 times a day  for total of 5-7 days

## 2017-02-01 NOTE — DISCHARGE NOTE ADULT - CARE PROVIDERS DIRECT ADDRESSES
[email or web address]

## 2017-02-01 NOTE — DISCHARGE NOTE ADULT - PLAN OF CARE
resolving management Continue follow up with Dr. Mathew for further management and treatment. Follow up with Dr. Viveros on February 17th as scheduled  Follow up chest CT in 4-6 weeks

## 2017-02-01 NOTE — DISCHARGE NOTE ADULT - PATIENT PORTAL LINK FT
“You can access the Follow[employer] Patient Portal, offered by [facility], by registering with the following website: [email or web address]”

## 2017-02-01 NOTE — DISCHARGE NOTE ADULT - CARE PLAN
Principal Discharge DX:	Pleural effusion  Goal:	resolving  Secondary Diagnosis:	CLL (chronic lymphocytic leukemia)  Goal:	management  Instructions for follow-up, activity and diet:	Continue follow up with Dr. Mathew for further management and treatment. Principal Discharge DX:	Pleural effusion  Goal:	resolving  Instructions for follow-up, activity and diet:	Follow up with Dr. Viveros on February 17th as scheduled  Follow up chest CT in 4-6 weeks  Secondary Diagnosis:	CLL (chronic lymphocytic leukemia)  Goal:	management  Instructions for follow-up, activity and diet:	Continue follow up with Dr. Mathew for further management and treatment.

## 2017-02-02 LAB
ANTIBODY ID 1_1: SIGNIFICANT CHANGE UP
APPEARANCE UR: CLEAR — SIGNIFICANT CHANGE UP
BILIRUB UR-MCNC: NEGATIVE — SIGNIFICANT CHANGE UP
BLD GP AB SCN SERPL QL: POSITIVE — SIGNIFICANT CHANGE UP
BLOOD UR QL VISUAL: NEGATIVE — SIGNIFICANT CHANGE UP
BUN SERPL-MCNC: 11 MG/DL — SIGNIFICANT CHANGE UP (ref 7–23)
BUN SERPL-MCNC: 12 MG/DL — SIGNIFICANT CHANGE UP (ref 7–23)
CALCIUM SERPL-MCNC: 8.4 MG/DL — SIGNIFICANT CHANGE UP (ref 8.4–10.5)
CALCIUM SERPL-MCNC: 8.7 MG/DL — SIGNIFICANT CHANGE UP (ref 8.4–10.5)
CHLORIDE SERPL-SCNC: 100 MMOL/L — SIGNIFICANT CHANGE UP (ref 98–107)
CHLORIDE SERPL-SCNC: 101 MMOL/L — SIGNIFICANT CHANGE UP (ref 98–107)
CO2 SERPL-SCNC: 20 MMOL/L — LOW (ref 22–31)
CO2 SERPL-SCNC: 21 MMOL/L — LOW (ref 22–31)
COLOR SPEC: SIGNIFICANT CHANGE UP
CREAT SERPL-MCNC: 1.18 MG/DL — SIGNIFICANT CHANGE UP (ref 0.5–1.3)
CREAT SERPL-MCNC: 1.23 MG/DL — SIGNIFICANT CHANGE UP (ref 0.5–1.3)
DAT C3-SP REAG RBC QL: POSITIVE — SIGNIFICANT CHANGE UP
DAT POLY-SP REAG RBC QL: POSITIVE — SIGNIFICANT CHANGE UP
DIRECT COOMBS IGG: POSITIVE — SIGNIFICANT CHANGE UP
ELUATE ANTIBODY 1: SIGNIFICANT CHANGE UP
GLUCOSE SERPL-MCNC: 104 MG/DL — HIGH (ref 70–99)
GLUCOSE SERPL-MCNC: 169 MG/DL — HIGH (ref 70–99)
GLUCOSE UR-MCNC: NEGATIVE — SIGNIFICANT CHANGE UP
HCT VFR BLD CALC: 29.2 % — LOW (ref 39–50)
HGB BLD-MCNC: 8.7 G/DL — LOW (ref 13–17)
KETONES UR-MCNC: NEGATIVE — SIGNIFICANT CHANGE UP
LEUKOCYTE ESTERASE UR-ACNC: NEGATIVE — SIGNIFICANT CHANGE UP
MAGNESIUM SERPL-MCNC: 1.9 MG/DL — SIGNIFICANT CHANGE UP (ref 1.6–2.6)
MCHC RBC-ENTMCNC: 29.8 % — LOW (ref 32–36)
MCHC RBC-ENTMCNC: 30.9 PG — SIGNIFICANT CHANGE UP (ref 27–34)
MCV RBC AUTO: 103.5 FL — HIGH (ref 80–100)
NITRITE UR-MCNC: NEGATIVE — SIGNIFICANT CHANGE UP
PH UR: 7 — SIGNIFICANT CHANGE UP (ref 4.6–8)
PHOSPHATE SERPL-MCNC: 3.2 MG/DL — SIGNIFICANT CHANGE UP (ref 2.5–4.5)
PLATELET # BLD AUTO: 120 K/UL — LOW (ref 150–400)
PMV BLD: 9.3 FL — SIGNIFICANT CHANGE UP (ref 7–13)
POTASSIUM SERPL-MCNC: 5.7 MMOL/L — HIGH (ref 3.5–5.3)
POTASSIUM SERPL-MCNC: 6.6 MMOL/L — CRITICAL HIGH (ref 3.5–5.3)
POTASSIUM SERPL-MCNC: 7.7 MMOL/L — CRITICAL HIGH (ref 3.5–5.3)
POTASSIUM SERPL-SCNC: 5.7 MMOL/L — HIGH (ref 3.5–5.3)
POTASSIUM SERPL-SCNC: 6.6 MMOL/L — CRITICAL HIGH (ref 3.5–5.3)
POTASSIUM SERPL-SCNC: 7.7 MMOL/L — CRITICAL HIGH (ref 3.5–5.3)
PROT UR-MCNC: 20 — SIGNIFICANT CHANGE UP
RBC # BLD: 2.82 M/UL — LOW (ref 4.2–5.8)
RBC # FLD: 19.8 % — HIGH (ref 10.3–14.5)
RBC CASTS # UR COMP ASSIST: SIGNIFICANT CHANGE UP (ref 0–?)
RH IG SCN BLD-IMP: POSITIVE — SIGNIFICANT CHANGE UP
SODIUM SERPL-SCNC: 134 MMOL/L — LOW (ref 135–145)
SODIUM SERPL-SCNC: 136 MMOL/L — SIGNIFICANT CHANGE UP (ref 135–145)
SP GR SPEC: 1.01 — SIGNIFICANT CHANGE UP (ref 1–1.03)
SPECIMEN SOURCE: SIGNIFICANT CHANGE UP
SQUAMOUS # UR AUTO: SIGNIFICANT CHANGE UP
UROBILINOGEN FLD QL: NORMAL E.U. — SIGNIFICANT CHANGE UP (ref 0.1–0.2)
WBC # BLD: 122.78 K/UL — CRITICAL HIGH (ref 3.8–10.5)
WBC # FLD AUTO: 122.78 K/UL — CRITICAL HIGH (ref 3.8–10.5)
WBC UR QL: SIGNIFICANT CHANGE UP (ref 0–?)

## 2017-02-02 PROCEDURE — 93010 ELECTROCARDIOGRAM REPORT: CPT

## 2017-02-02 PROCEDURE — 86077 PHYS BLOOD BANK SERV XMATCH: CPT

## 2017-02-02 RX ORDER — DEXTROSE 50 % IN WATER 50 %
50 SYRINGE (ML) INTRAVENOUS ONCE
Qty: 0 | Refills: 0 | Status: COMPLETED | OUTPATIENT
Start: 2017-02-02 | End: 2017-02-02

## 2017-02-02 RX ORDER — DEXTROSE 50 % IN WATER 50 %
50 SYRINGE (ML) INTRAVENOUS ONCE
Qty: 0 | Refills: 0 | Status: DISCONTINUED | OUTPATIENT
Start: 2017-02-02 | End: 2017-02-04

## 2017-02-02 RX ORDER — INSULIN HUMAN 100 [IU]/ML
5 INJECTION, SOLUTION SUBCUTANEOUS ONCE
Qty: 0 | Refills: 0 | Status: COMPLETED | OUTPATIENT
Start: 2017-02-02 | End: 2017-02-02

## 2017-02-02 RX ORDER — ALBUTEROL 90 UG/1
2.5 AEROSOL, METERED ORAL ONCE
Qty: 0 | Refills: 0 | Status: COMPLETED | OUTPATIENT
Start: 2017-02-02 | End: 2017-02-02

## 2017-02-02 RX ORDER — ALBUTEROL 90 UG/1
2.5 AEROSOL, METERED ORAL ONCE
Qty: 0 | Refills: 0 | Status: DISCONTINUED | OUTPATIENT
Start: 2017-02-02 | End: 2017-02-04

## 2017-02-02 RX ADMIN — INSULIN HUMAN 5 UNIT(S): 100 INJECTION, SOLUTION SUBCUTANEOUS at 14:08

## 2017-02-02 RX ADMIN — PIPERACILLIN AND TAZOBACTAM 25 GRAM(S): 4; .5 INJECTION, POWDER, LYOPHILIZED, FOR SOLUTION INTRAVENOUS at 14:07

## 2017-02-02 RX ADMIN — Medication 650 MILLIGRAM(S): at 23:19

## 2017-02-02 RX ADMIN — AMLODIPINE BESYLATE 10 MILLIGRAM(S): 2.5 TABLET ORAL at 05:01

## 2017-02-02 RX ADMIN — OXYCODONE HYDROCHLORIDE 5 MILLIGRAM(S): 5 TABLET ORAL at 12:28

## 2017-02-02 RX ADMIN — Medication 50 MILLILITER(S): at 14:07

## 2017-02-02 RX ADMIN — ENOXAPARIN SODIUM 40 MILLIGRAM(S): 100 INJECTION SUBCUTANEOUS at 05:02

## 2017-02-02 RX ADMIN — FINASTERIDE 5 MILLIGRAM(S): 5 TABLET, FILM COATED ORAL at 12:26

## 2017-02-02 RX ADMIN — OXYCODONE HYDROCHLORIDE 5 MILLIGRAM(S): 5 TABLET ORAL at 13:00

## 2017-02-02 RX ADMIN — PIPERACILLIN AND TAZOBACTAM 25 GRAM(S): 4; .5 INJECTION, POWDER, LYOPHILIZED, FOR SOLUTION INTRAVENOUS at 22:36

## 2017-02-02 RX ADMIN — PIPERACILLIN AND TAZOBACTAM 25 GRAM(S): 4; .5 INJECTION, POWDER, LYOPHILIZED, FOR SOLUTION INTRAVENOUS at 05:01

## 2017-02-02 RX ADMIN — INSULIN HUMAN 5 UNIT(S): 100 INJECTION, SOLUTION SUBCUTANEOUS at 22:36

## 2017-02-02 RX ADMIN — Medication 300 MILLIGRAM(S): at 12:26

## 2017-02-02 RX ADMIN — ALBUTEROL 2.5 MILLIGRAM(S): 90 AEROSOL, METERED ORAL at 15:31

## 2017-02-02 RX ADMIN — ATORVASTATIN CALCIUM 20 MILLIGRAM(S): 80 TABLET, FILM COATED ORAL at 22:35

## 2017-02-02 RX ADMIN — Medication 100 MICROGRAM(S): at 05:02

## 2017-02-02 NOTE — PROVIDER CONTACT NOTE (CRITICAL VALUE NOTIFICATION) - RECOMMENDATIONS
this is second BMP. MD will notify with intervention this is second BMP. possible medication intervention

## 2017-02-02 NOTE — DIETITIAN INITIAL EVALUATION ADULT. - OTHER INFO
Nutrition assessment initiated for LOS . Pt. tolerating PO , able to take > 75% of the meals, reports no known food allergies, no difficulty chewing, swallowing , was not on therapeutic diet , spouse admits non compliance , stressed on the need .

## 2017-02-03 LAB
ALBUMIN SERPL ELPH-MCNC: 3.3 G/DL — SIGNIFICANT CHANGE UP (ref 3.3–5)
ALP SERPL-CCNC: 75 U/L — SIGNIFICANT CHANGE UP (ref 40–120)
ALT FLD-CCNC: 13 U/L — SIGNIFICANT CHANGE UP (ref 4–41)
ANISOCYTOSIS BLD QL: SLIGHT — SIGNIFICANT CHANGE UP
AST SERPL-CCNC: 28 U/L — SIGNIFICANT CHANGE UP (ref 4–40)
BACTERIA BLD CULT: SIGNIFICANT CHANGE UP
BASE EXCESS BLDA CALC-SCNC: -0.9 MMOL/L — SIGNIFICANT CHANGE UP
BASOPHILS # BLD AUTO: 0.43 K/UL — HIGH (ref 0–0.2)
BASOPHILS NFR BLD AUTO: 0.3 % — SIGNIFICANT CHANGE UP (ref 0–2)
BASOPHILS NFR SPEC: 0 % — SIGNIFICANT CHANGE UP (ref 0–2)
BILIRUB SERPL-MCNC: 1.4 MG/DL — HIGH (ref 0.2–1.2)
BUN SERPL-MCNC: 13 MG/DL — SIGNIFICANT CHANGE UP (ref 7–23)
CA-I BLDA-SCNC: 1.05 MMOL/L — LOW (ref 1.15–1.29)
CALCIUM SERPL-MCNC: 8 MG/DL — LOW (ref 8.4–10.5)
CHLORIDE SERPL-SCNC: 98 MMOL/L — SIGNIFICANT CHANGE UP (ref 98–107)
CO2 SERPL-SCNC: 19 MMOL/L — LOW (ref 22–31)
CREAT SERPL-MCNC: 1.08 MG/DL — SIGNIFICANT CHANGE UP (ref 0.5–1.3)
EOSINOPHIL # BLD AUTO: 0.22 K/UL — SIGNIFICANT CHANGE UP (ref 0–0.5)
EOSINOPHIL NFR BLD AUTO: 0.1 % — SIGNIFICANT CHANGE UP (ref 0–6)
EOSINOPHIL NFR FLD: 0 % — SIGNIFICANT CHANGE UP (ref 0–6)
GLUCOSE BLDA-MCNC: 136 MG/DL — HIGH (ref 70–99)
GLUCOSE SERPL-MCNC: 135 MG/DL — HIGH (ref 70–99)
HCO3 BLDA-SCNC: 24 MMOL/L — SIGNIFICANT CHANGE UP (ref 22–26)
HCT VFR BLD CALC: 33.7 % — LOW (ref 39–50)
HCT VFR BLDA CALC: 24.9 % — LOW (ref 39–51)
HGB BLD-MCNC: 10.1 G/DL — LOW (ref 13–17)
HGB BLDA-MCNC: 8 G/DL — LOW (ref 13–17)
IMM GRANULOCYTES NFR BLD AUTO: 0.2 % — SIGNIFICANT CHANGE UP (ref 0–1.5)
LACTATE BLDA-SCNC: 1.3 MMOL/L — SIGNIFICANT CHANGE UP (ref 0.5–2)
LDH SERPL L TO P-CCNC: 351 U/L — HIGH (ref 135–225)
LYMPHOCYTES # BLD AUTO: 146.44 K/UL — HIGH (ref 1–3.3)
LYMPHOCYTES # BLD AUTO: 93.4 % — HIGH (ref 13–44)
LYMPHOCYTES NFR SPEC AUTO: 91 % — HIGH (ref 13–44)
MAGNESIUM SERPL-MCNC: 1.8 MG/DL — SIGNIFICANT CHANGE UP (ref 1.6–2.6)
MANUAL SMEAR VERIFICATION: SIGNIFICANT CHANGE UP
MCHC RBC-ENTMCNC: 30 % — LOW (ref 32–36)
MCHC RBC-ENTMCNC: 31.4 PG — SIGNIFICANT CHANGE UP (ref 27–34)
MCV RBC AUTO: 104.7 FL — HIGH (ref 80–100)
MONOCYTES # BLD AUTO: 2.07 K/UL — HIGH (ref 0–0.9)
MONOCYTES NFR BLD AUTO: 1.3 % — LOW (ref 2–14)
MONOCYTES NFR BLD: 2 % — SIGNIFICANT CHANGE UP (ref 2–9)
MYELOCYTES NFR BLD: 1 % — HIGH (ref 0–0)
NEUTROPHIL AB SER-ACNC: 6 % — LOW (ref 43–77)
NEUTROPHILS # BLD AUTO: 7.34 K/UL — SIGNIFICANT CHANGE UP (ref 1.8–7.4)
NEUTROPHILS NFR BLD AUTO: 4.7 % — LOW (ref 43–77)
PCO2 BLDA: 32 MMHG — LOW (ref 35–48)
PH BLDA: 7.46 PH — HIGH (ref 7.35–7.45)
PHOSPHATE SERPL-MCNC: 3.1 MG/DL — SIGNIFICANT CHANGE UP (ref 2.5–4.5)
PLATELET # BLD AUTO: 114 K/UL — LOW (ref 150–400)
PLATELET COUNT - ESTIMATE: SIGNIFICANT CHANGE UP
PMV BLD: 8.9 FL — SIGNIFICANT CHANGE UP (ref 7–13)
PO2 BLDA: 64 MMHG — LOW (ref 83–108)
POLYCHROMASIA BLD QL SMEAR: SLIGHT — SIGNIFICANT CHANGE UP
POTASSIUM BLDA-SCNC: 3.2 MMOL/L — LOW (ref 3.4–4.5)
POTASSIUM SERPL-MCNC: 6.7 MMOL/L — CRITICAL HIGH (ref 3.5–5.3)
POTASSIUM SERPL-MCNC: 7.9 MMOL/L — CRITICAL HIGH (ref 3.5–5.3)
POTASSIUM SERPL-SCNC: 6.7 MMOL/L — CRITICAL HIGH (ref 3.5–5.3)
POTASSIUM SERPL-SCNC: 7.9 MMOL/L — CRITICAL HIGH (ref 3.5–5.3)
PROT SERPL-MCNC: 5.2 G/DL — LOW (ref 6–8.3)
RBC # BLD: 3.22 M/UL — LOW (ref 4.2–5.8)
RBC # FLD: 20 % — HIGH (ref 10.3–14.5)
SAO2 % BLDA: 94 % — LOW (ref 95–99)
SMUDGE CELLS # BLD: PRESENT — SIGNIFICANT CHANGE UP
SODIUM BLDA-SCNC: 128 MMOL/L — LOW (ref 136–146)
SODIUM SERPL-SCNC: 129 MMOL/L — LOW (ref 135–145)
SPECIMEN SOURCE: SIGNIFICANT CHANGE UP
URATE SERPL-MCNC: 2.4 MG/DL — LOW (ref 3.4–8.8)
WBC # BLD: 156.87 K/UL — CRITICAL HIGH (ref 3.8–10.5)
WBC # FLD AUTO: 156.87 K/UL — CRITICAL HIGH (ref 3.8–10.5)

## 2017-02-03 PROCEDURE — 99233 SBSQ HOSP IP/OBS HIGH 50: CPT | Mod: GC

## 2017-02-03 PROCEDURE — 93010 ELECTROCARDIOGRAM REPORT: CPT

## 2017-02-03 RX ADMIN — PIPERACILLIN AND TAZOBACTAM 25 GRAM(S): 4; .5 INJECTION, POWDER, LYOPHILIZED, FOR SOLUTION INTRAVENOUS at 15:01

## 2017-02-03 RX ADMIN — AMLODIPINE BESYLATE 10 MILLIGRAM(S): 2.5 TABLET ORAL at 05:10

## 2017-02-03 RX ADMIN — ATORVASTATIN CALCIUM 20 MILLIGRAM(S): 80 TABLET, FILM COATED ORAL at 21:16

## 2017-02-03 RX ADMIN — ENOXAPARIN SODIUM 40 MILLIGRAM(S): 100 INJECTION SUBCUTANEOUS at 05:09

## 2017-02-03 RX ADMIN — FINASTERIDE 5 MILLIGRAM(S): 5 TABLET, FILM COATED ORAL at 12:34

## 2017-02-03 RX ADMIN — PIPERACILLIN AND TAZOBACTAM 25 GRAM(S): 4; .5 INJECTION, POWDER, LYOPHILIZED, FOR SOLUTION INTRAVENOUS at 21:34

## 2017-02-03 RX ADMIN — Medication 300 MILLIGRAM(S): at 12:34

## 2017-02-03 RX ADMIN — Medication 100 MICROGRAM(S): at 05:10

## 2017-02-03 RX ADMIN — PIPERACILLIN AND TAZOBACTAM 25 GRAM(S): 4; .5 INJECTION, POWDER, LYOPHILIZED, FOR SOLUTION INTRAVENOUS at 05:10

## 2017-02-04 LAB
BACTERIA BLD CULT: SIGNIFICANT CHANGE UP
BASOPHILS # BLD AUTO: 0.47 K/UL — HIGH (ref 0–0.2)
BASOPHILS NFR BLD AUTO: 0.3 % — SIGNIFICANT CHANGE UP (ref 0–2)
BUN SERPL-MCNC: 16 MG/DL — SIGNIFICANT CHANGE UP (ref 7–23)
CALCIUM SERPL-MCNC: 8.7 MG/DL — SIGNIFICANT CHANGE UP (ref 8.4–10.5)
CHLORIDE SERPL-SCNC: 98 MMOL/L — SIGNIFICANT CHANGE UP (ref 98–107)
CO2 SERPL-SCNC: 20 MMOL/L — LOW (ref 22–31)
CREAT SERPL-MCNC: 1.32 MG/DL — HIGH (ref 0.5–1.3)
EOSINOPHIL # BLD AUTO: 0.23 K/UL — SIGNIFICANT CHANGE UP (ref 0–0.5)
EOSINOPHIL NFR BLD AUTO: 0.1 % — SIGNIFICANT CHANGE UP (ref 0–6)
GLUCOSE SERPL-MCNC: 109 MG/DL — HIGH (ref 70–99)
HAPTOGLOB SERPL-MCNC: 224 MG/DL — HIGH (ref 34–200)
HCT VFR BLD CALC: 29.4 % — LOW (ref 39–50)
HGB BLD-MCNC: 8.7 G/DL — LOW (ref 13–17)
IMM GRANULOCYTES NFR BLD AUTO: 0.3 % — SIGNIFICANT CHANGE UP (ref 0–1.5)
LDH SERPL L TO P-CCNC: 324 U/L — HIGH (ref 135–225)
LYMPHOCYTES # BLD AUTO: 164.64 K/UL — HIGH (ref 1–3.3)
LYMPHOCYTES # BLD AUTO: 94.2 % — HIGH (ref 13–44)
MAGNESIUM SERPL-MCNC: 1.8 MG/DL — SIGNIFICANT CHANGE UP (ref 1.6–2.6)
MANUAL SMEAR VERIFICATION: SIGNIFICANT CHANGE UP
MCHC RBC-ENTMCNC: 29.6 % — LOW (ref 32–36)
MCHC RBC-ENTMCNC: 31 PG — SIGNIFICANT CHANGE UP (ref 27–34)
MCV RBC AUTO: 104.6 FL — HIGH (ref 80–100)
MONOCYTES # BLD AUTO: 0.82 K/UL — SIGNIFICANT CHANGE UP (ref 0–0.9)
MONOCYTES NFR BLD AUTO: 0.5 % — LOW (ref 2–14)
NEUTROPHILS # BLD AUTO: 7.99 K/UL — HIGH (ref 1.8–7.4)
NEUTROPHILS NFR BLD AUTO: 4.6 % — LOW (ref 43–77)
PHOSPHATE SERPL-MCNC: 3 MG/DL — SIGNIFICANT CHANGE UP (ref 2.5–4.5)
PLATELET # BLD AUTO: 113 K/UL — LOW (ref 150–400)
PMV BLD: 9 FL — SIGNIFICANT CHANGE UP (ref 7–13)
POTASSIUM SERPL-MCNC: 6 MMOL/L — HIGH (ref 3.5–5.3)
POTASSIUM SERPL-SCNC: 6 MMOL/L — HIGH (ref 3.5–5.3)
RBC # BLD: 2.81 M/UL — LOW (ref 4.2–5.8)
RBC # FLD: 19.4 % — HIGH (ref 10.3–14.5)
SODIUM SERPL-SCNC: 132 MMOL/L — LOW (ref 135–145)
URATE SERPL-MCNC: 2.5 MG/DL — LOW (ref 3.4–8.8)
WBC # BLD: 174.7 K/UL — CRITICAL HIGH (ref 3.8–10.5)
WBC # FLD AUTO: 174.7 K/UL — CRITICAL HIGH (ref 3.8–10.5)

## 2017-02-04 PROCEDURE — 99233 SBSQ HOSP IP/OBS HIGH 50: CPT | Mod: GC

## 2017-02-04 RX ADMIN — AMLODIPINE BESYLATE 10 MILLIGRAM(S): 2.5 TABLET ORAL at 05:28

## 2017-02-04 RX ADMIN — FINASTERIDE 5 MILLIGRAM(S): 5 TABLET, FILM COATED ORAL at 11:52

## 2017-02-04 RX ADMIN — Medication 300 MILLIGRAM(S): at 11:52

## 2017-02-04 RX ADMIN — ENOXAPARIN SODIUM 40 MILLIGRAM(S): 100 INJECTION SUBCUTANEOUS at 05:28

## 2017-02-04 RX ADMIN — PIPERACILLIN AND TAZOBACTAM 25 GRAM(S): 4; .5 INJECTION, POWDER, LYOPHILIZED, FOR SOLUTION INTRAVENOUS at 14:48

## 2017-02-04 RX ADMIN — PIPERACILLIN AND TAZOBACTAM 25 GRAM(S): 4; .5 INJECTION, POWDER, LYOPHILIZED, FOR SOLUTION INTRAVENOUS at 05:28

## 2017-02-04 RX ADMIN — Medication 100 MICROGRAM(S): at 05:29

## 2017-02-04 RX ADMIN — PIPERACILLIN AND TAZOBACTAM 25 GRAM(S): 4; .5 INJECTION, POWDER, LYOPHILIZED, FOR SOLUTION INTRAVENOUS at 21:56

## 2017-02-04 RX ADMIN — ATORVASTATIN CALCIUM 20 MILLIGRAM(S): 80 TABLET, FILM COATED ORAL at 21:56

## 2017-02-05 LAB
BASOPHILS # BLD AUTO: 0.35 K/UL — HIGH (ref 0–0.2)
BASOPHILS NFR BLD AUTO: 0.3 % — SIGNIFICANT CHANGE UP (ref 0–2)
BASOPHILS NFR SPEC: 0 % — SIGNIFICANT CHANGE UP (ref 0–2)
BUN SERPL-MCNC: 17 MG/DL — SIGNIFICANT CHANGE UP (ref 7–23)
CALCIUM SERPL-MCNC: 8.5 MG/DL — SIGNIFICANT CHANGE UP (ref 8.4–10.5)
CHLORIDE SERPL-SCNC: 96 MMOL/L — LOW (ref 98–107)
CO2 SERPL-SCNC: 20 MMOL/L — LOW (ref 22–31)
CREAT SERPL-MCNC: 1.16 MG/DL — SIGNIFICANT CHANGE UP (ref 0.5–1.3)
EOSINOPHIL # BLD AUTO: 0.15 K/UL — SIGNIFICANT CHANGE UP (ref 0–0.5)
EOSINOPHIL NFR BLD AUTO: 0.1 % — SIGNIFICANT CHANGE UP (ref 0–6)
EOSINOPHIL NFR FLD: 0 % — SIGNIFICANT CHANGE UP (ref 0–6)
GLUCOSE SERPL-MCNC: 107 MG/DL — HIGH (ref 70–99)
HAPTOGLOB SERPL-MCNC: 220 MG/DL — HIGH (ref 34–200)
HCT VFR BLD CALC: 24.2 % — LOW (ref 39–50)
HGB BLD-MCNC: 7.7 G/DL — LOW (ref 13–17)
IMM GRANULOCYTES NFR BLD AUTO: 0.2 % — SIGNIFICANT CHANGE UP (ref 0–1.5)
LDH SERPL L TO P-CCNC: 368 U/L — HIGH (ref 135–225)
LYMPHOCYTES # BLD AUTO: 123 K/UL — HIGH (ref 1–3.3)
LYMPHOCYTES # BLD AUTO: 92.6 % — HIGH (ref 13–44)
LYMPHOCYTES NFR SPEC AUTO: 95 % — HIGH (ref 13–44)
MAGNESIUM SERPL-MCNC: 1.8 MG/DL — SIGNIFICANT CHANGE UP (ref 1.6–2.6)
MANUAL SMEAR VERIFICATION: SIGNIFICANT CHANGE UP
MCHC RBC-ENTMCNC: 31.6 PG — SIGNIFICANT CHANGE UP (ref 27–34)
MCHC RBC-ENTMCNC: 31.8 % — LOW (ref 32–36)
MCV RBC AUTO: 99.2 FL — SIGNIFICANT CHANGE UP (ref 80–100)
MONOCYTES # BLD AUTO: 1.74 K/UL — HIGH (ref 0–0.9)
MONOCYTES NFR BLD AUTO: 1.3 % — LOW (ref 2–14)
MONOCYTES NFR BLD: 0 % — LOW (ref 2–9)
MORPHOLOGY BLD-IMP: SIGNIFICANT CHANGE UP
NEUTROPHIL AB SER-ACNC: 5 % — LOW (ref 43–77)
NEUTROPHILS # BLD AUTO: 7.35 K/UL — SIGNIFICANT CHANGE UP (ref 1.8–7.4)
NEUTROPHILS NFR BLD AUTO: 5.5 % — LOW (ref 43–77)
PHOSPHATE SERPL-MCNC: 3.2 MG/DL — SIGNIFICANT CHANGE UP (ref 2.5–4.5)
PLATELET # BLD AUTO: 106 K/UL — LOW (ref 150–400)
PLATELET COUNT - ESTIMATE: SIGNIFICANT CHANGE UP
PMV BLD: 10.3 FL — SIGNIFICANT CHANGE UP (ref 7–13)
POTASSIUM SERPL-MCNC: 5 MMOL/L — SIGNIFICANT CHANGE UP (ref 3.5–5.3)
POTASSIUM SERPL-SCNC: 5 MMOL/L — SIGNIFICANT CHANGE UP (ref 3.5–5.3)
RBC # BLD: 2.44 M/UL — LOW (ref 4.2–5.8)
RBC # FLD: 19.3 % — HIGH (ref 10.3–14.5)
SMUDGE CELLS # BLD: PRESENT — SIGNIFICANT CHANGE UP
SODIUM SERPL-SCNC: 133 MMOL/L — LOW (ref 135–145)
URATE SERPL-MCNC: 2.4 MG/DL — LOW (ref 3.4–8.8)
WBC # BLD: 132.87 K/UL — CRITICAL HIGH (ref 3.8–10.5)
WBC # FLD AUTO: 132.87 K/UL — CRITICAL HIGH (ref 3.8–10.5)

## 2017-02-05 PROCEDURE — 99233 SBSQ HOSP IP/OBS HIGH 50: CPT | Mod: GC

## 2017-02-05 RX ADMIN — ENOXAPARIN SODIUM 40 MILLIGRAM(S): 100 INJECTION SUBCUTANEOUS at 05:12

## 2017-02-05 RX ADMIN — Medication 300 MILLIGRAM(S): at 11:11

## 2017-02-05 RX ADMIN — AMLODIPINE BESYLATE 10 MILLIGRAM(S): 2.5 TABLET ORAL at 05:13

## 2017-02-05 RX ADMIN — ATORVASTATIN CALCIUM 20 MILLIGRAM(S): 80 TABLET, FILM COATED ORAL at 21:01

## 2017-02-05 RX ADMIN — PIPERACILLIN AND TAZOBACTAM 25 GRAM(S): 4; .5 INJECTION, POWDER, LYOPHILIZED, FOR SOLUTION INTRAVENOUS at 05:12

## 2017-02-05 RX ADMIN — FINASTERIDE 5 MILLIGRAM(S): 5 TABLET, FILM COATED ORAL at 11:11

## 2017-02-05 RX ADMIN — Medication 100 MICROGRAM(S): at 05:12

## 2017-02-05 RX ADMIN — PIPERACILLIN AND TAZOBACTAM 25 GRAM(S): 4; .5 INJECTION, POWDER, LYOPHILIZED, FOR SOLUTION INTRAVENOUS at 21:01

## 2017-02-05 RX ADMIN — PIPERACILLIN AND TAZOBACTAM 25 GRAM(S): 4; .5 INJECTION, POWDER, LYOPHILIZED, FOR SOLUTION INTRAVENOUS at 15:18

## 2017-02-06 VITALS
DIASTOLIC BLOOD PRESSURE: 51 MMHG | HEART RATE: 92 BPM | RESPIRATION RATE: 18 BRPM | SYSTOLIC BLOOD PRESSURE: 138 MMHG | TEMPERATURE: 99 F | OXYGEN SATURATION: 91 %

## 2017-02-06 LAB
BACTERIA BLD CULT: SIGNIFICANT CHANGE UP
BACTERIA FLD CULT: SIGNIFICANT CHANGE UP
BASOPHILS # BLD AUTO: 0.78 K/UL — HIGH (ref 0–0.2)
BASOPHILS NFR BLD AUTO: 0.4 % — SIGNIFICANT CHANGE UP (ref 0–2)
BUN SERPL-MCNC: 19 MG/DL — SIGNIFICANT CHANGE UP (ref 7–23)
CALCIUM SERPL-MCNC: 8.9 MG/DL — SIGNIFICANT CHANGE UP (ref 8.4–10.5)
CHLORIDE SERPL-SCNC: 96 MMOL/L — LOW (ref 98–107)
CO2 SERPL-SCNC: 22 MMOL/L — SIGNIFICANT CHANGE UP (ref 22–31)
CREAT SERPL-MCNC: 1.24 MG/DL — SIGNIFICANT CHANGE UP (ref 0.5–1.3)
EOSINOPHIL # BLD AUTO: 0.1 K/UL — SIGNIFICANT CHANGE UP (ref 0–0.5)
EOSINOPHIL NFR BLD AUTO: 0 % — SIGNIFICANT CHANGE UP (ref 0–6)
GLUCOSE SERPL-MCNC: 115 MG/DL — HIGH (ref 70–99)
HAPTOGLOB SERPL-MCNC: 245 MG/DL — HIGH (ref 34–200)
HCT VFR BLD CALC: 30.1 % — LOW (ref 39–50)
HGB BLD-MCNC: 8.8 G/DL — LOW (ref 13–17)
IMM GRANULOCYTES NFR BLD AUTO: 0.3 % — SIGNIFICANT CHANGE UP (ref 0–1.5)
LDH SERPL L TO P-CCNC: 369 U/L — HIGH (ref 135–225)
LYMPHOCYTES # BLD AUTO: 200.69 K/UL — HIGH (ref 1–3.3)
LYMPHOCYTES # BLD AUTO: 94.2 % — HIGH (ref 13–44)
MAGNESIUM SERPL-MCNC: 1.9 MG/DL — SIGNIFICANT CHANGE UP (ref 1.6–2.6)
MANUAL SMEAR VERIFICATION: SIGNIFICANT CHANGE UP
MCHC RBC-ENTMCNC: 29.2 % — LOW (ref 32–36)
MCHC RBC-ENTMCNC: 30.7 PG — SIGNIFICANT CHANGE UP (ref 27–34)
MCV RBC AUTO: 104.9 FL — HIGH (ref 80–100)
MONOCYTES # BLD AUTO: 2.98 K/UL — HIGH (ref 0–0.9)
MONOCYTES NFR BLD AUTO: 1.4 % — LOW (ref 2–14)
NEUTROPHILS # BLD AUTO: 7.87 K/UL — HIGH (ref 1.8–7.4)
NEUTROPHILS NFR BLD AUTO: 3.7 % — LOW (ref 43–77)
PHOSPHATE SERPL-MCNC: 3.1 MG/DL — SIGNIFICANT CHANGE UP (ref 2.5–4.5)
PLATELET # BLD AUTO: 129 K/UL — LOW (ref 150–400)
PMV BLD: 9 FL — SIGNIFICANT CHANGE UP (ref 7–13)
POTASSIUM SERPL-MCNC: 5.3 MMOL/L — SIGNIFICANT CHANGE UP (ref 3.5–5.3)
POTASSIUM SERPL-SCNC: 5.3 MMOL/L — SIGNIFICANT CHANGE UP (ref 3.5–5.3)
RBC # BLD: 2.87 M/UL — LOW (ref 4.2–5.8)
RBC # FLD: 19.4 % — HIGH (ref 10.3–14.5)
SODIUM SERPL-SCNC: 136 MMOL/L — SIGNIFICANT CHANGE UP (ref 135–145)
URATE SERPL-MCNC: 2.8 MG/DL — LOW (ref 3.4–8.8)
WBC # BLD: 213.06 K/UL — CRITICAL HIGH (ref 3.8–10.5)
WBC # FLD AUTO: 213.06 K/UL — CRITICAL HIGH (ref 3.8–10.5)

## 2017-02-06 PROCEDURE — 99233 SBSQ HOSP IP/OBS HIGH 50: CPT | Mod: GC

## 2017-02-06 RX ORDER — CIPROFLOXACIN LACTATE 400MG/40ML
1 VIAL (ML) INTRAVENOUS
Qty: 14 | Refills: 0 | OUTPATIENT
Start: 2017-02-06 | End: 2017-02-16

## 2017-02-06 RX ADMIN — PIPERACILLIN AND TAZOBACTAM 25 GRAM(S): 4; .5 INJECTION, POWDER, LYOPHILIZED, FOR SOLUTION INTRAVENOUS at 05:01

## 2017-02-06 RX ADMIN — FINASTERIDE 5 MILLIGRAM(S): 5 TABLET, FILM COATED ORAL at 12:34

## 2017-02-06 RX ADMIN — AMLODIPINE BESYLATE 10 MILLIGRAM(S): 2.5 TABLET ORAL at 05:01

## 2017-02-06 RX ADMIN — Medication 100 MICROGRAM(S): at 05:01

## 2017-02-06 RX ADMIN — Medication 300 MILLIGRAM(S): at 12:34

## 2017-02-06 RX ADMIN — ENOXAPARIN SODIUM 40 MILLIGRAM(S): 100 INJECTION SUBCUTANEOUS at 04:56

## 2017-02-06 NOTE — PROVIDER CONTACT NOTE (CRITICAL VALUE NOTIFICATION) - PERSON GIVING RESULT:
Alma Rosa Lomax in the lab
Estela Christian
Hemotalogy Samy Coyne
Laboratory/ rajiv
Rianna, microbiology
Sandy Wong
Stef, Hematology
Susannah
Tommy Chamorro
nathanael Snow
Hematology
Hematology Edith Mejia
Toxicology
Chemistry -Lorie Carlisle

## 2017-02-06 NOTE — PROVIDER CONTACT NOTE (CRITICAL VALUE NOTIFICATION) - NAME OF MD/NP/PA/DO NOTIFIED:
Dr. Becerril #[id number]
Dr. Becerril #[id number]
Dr. Eli Becerril
Dr. Whalen
Dr. Becerril #[id number]
Dr. Thompson
Jone Team 4
Jone, Team 4
Jone, team 4
Karel Thompson MD
SHAKIR Philip [id number]
Scott Magana
Team 4
Team 4 [id number]
Team 4, [id number]
Eli Serra [id number]
Eli Serra [id number]
Eli Serra

## 2017-02-06 NOTE — PROVIDER CONTACT NOTE (CRITICAL VALUE NOTIFICATION) - TEST AND RESULT REPORTED:
.65, H/H 6.9/23
.8
.83
.91
.97
.06
.7
.78
.87
H/H 6.2/ 21.3
K 6.6
K 6.7
K 7.7
K 7.9
WBC-141.08
.29, Hgb 6.7, Hct 23.7
.56
K 6.9 slightly hemolyzed

## 2017-02-06 NOTE — PROVIDER CONTACT NOTE (CRITICAL VALUE NOTIFICATION) - BACKGROUND
Pt. with CLL
Pt. admitted with pleural effusion
Pt. admitted with pleural effusion
Pt. admitted with pleuural effusion
87 y/o male admitted with pleural effusions, history of CLL
Patient admitted for SOB 2/2 R pleural effusion
Patient admitted for SOB 2/2 R pleural effusion.
Patient is admitted for SOB 2/2 R pleural effusion. PMH CLL
Patient is admitted for pleural effusion. HX of CLL, HTN, HLD
Pt admitted with R pleural effusion/ S/P R chest tube placement 1/30/17
Pt came in for pleural effusion. HX of HTN, HLD, BPH, CLL
Pt. has Hx of Chronic lymphocytic leukemia.
pt admitted for pleural effusion
pt admitted with pleural effusion
pt admitted with plueral effusion

## 2017-02-07 LAB — BACTERIA BLD CULT: SIGNIFICANT CHANGE UP

## 2017-02-17 ENCOUNTER — APPOINTMENT (OUTPATIENT)
Dept: PULMONOLOGY | Facility: CLINIC | Age: 82
End: 2017-02-17

## 2017-02-17 ENCOUNTER — OTHER (OUTPATIENT)
Age: 82
End: 2017-02-17

## 2017-02-17 VITALS
TEMPERATURE: 97.7 F | RESPIRATION RATE: 17 BRPM | HEIGHT: 67 IN | BODY MASS INDEX: 21.35 KG/M2 | OXYGEN SATURATION: 93 % | SYSTOLIC BLOOD PRESSURE: 120 MMHG | WEIGHT: 136 LBS | HEART RATE: 83 BPM | DIASTOLIC BLOOD PRESSURE: 60 MMHG

## 2017-02-17 DIAGNOSIS — R05 COUGH: ICD-10-CM

## 2017-02-17 DIAGNOSIS — M79.89 OTHER SPECIFIED SOFT TISSUE DISORDERS: ICD-10-CM

## 2017-02-17 DIAGNOSIS — Z87.01 PERSONAL HISTORY OF PNEUMONIA (RECURRENT): ICD-10-CM

## 2017-02-17 DIAGNOSIS — N40.0 BENIGN PROSTATIC HYPERPLASIA WITHOUT LOWER URINARY TRACT SYMPMS: ICD-10-CM

## 2017-02-17 DIAGNOSIS — A15.9 RESPIRATORY TUBERCULOSIS UNSPECIFIED: ICD-10-CM

## 2017-02-17 DIAGNOSIS — Z92.21 PERSONAL HISTORY OF ANTINEOPLASTIC CHEMOTHERAPY: ICD-10-CM

## 2017-02-17 DIAGNOSIS — M19.90 UNSPECIFIED OSTEOARTHRITIS, UNSPECIFIED SITE: ICD-10-CM

## 2017-02-17 DIAGNOSIS — M25.50 PAIN IN UNSPECIFIED JOINT: ICD-10-CM

## 2017-02-17 DIAGNOSIS — E07.9 DISORDER OF THYROID, UNSPECIFIED: ICD-10-CM

## 2017-02-17 DIAGNOSIS — R53.83 OTHER FATIGUE: ICD-10-CM

## 2017-02-17 DIAGNOSIS — R39.89 OTHER SYMPTOMS AND SIGNS INVOLVING THE GENITOURINARY SYSTEM: ICD-10-CM

## 2017-02-17 DIAGNOSIS — R63.0 ANOREXIA: ICD-10-CM

## 2017-02-17 DIAGNOSIS — E78.5 HYPERLIPIDEMIA, UNSPECIFIED: ICD-10-CM

## 2017-02-17 DIAGNOSIS — H53.8 OTHER VISUAL DISTURBANCES: ICD-10-CM

## 2017-02-17 DIAGNOSIS — R63.4 ABNORMAL WEIGHT LOSS: ICD-10-CM

## 2017-02-17 DIAGNOSIS — Z86.79 PERSONAL HISTORY OF OTHER DISEASES OF THE CIRCULATORY SYSTEM: ICD-10-CM

## 2017-02-17 DIAGNOSIS — C91.10 CHRONIC LYMPHOCYTIC LEUKEMIA OF B-CELL TYPE NOT HAVING ACHIEVED REMISSION: ICD-10-CM

## 2017-02-17 DIAGNOSIS — R06.02 SHORTNESS OF BREATH: ICD-10-CM

## 2017-02-17 DIAGNOSIS — Z87.891 PERSONAL HISTORY OF NICOTINE DEPENDENCE: ICD-10-CM

## 2017-02-17 DIAGNOSIS — I10 ESSENTIAL (PRIMARY) HYPERTENSION: ICD-10-CM

## 2017-02-17 DIAGNOSIS — R40.0 SOMNOLENCE: ICD-10-CM

## 2017-02-17 DIAGNOSIS — L50.9 URTICARIA, UNSPECIFIED: ICD-10-CM

## 2017-02-17 DIAGNOSIS — E78.00 PURE HYPERCHOLESTEROLEMIA, UNSPECIFIED: ICD-10-CM

## 2017-02-17 DIAGNOSIS — R06.83 SNORING: ICD-10-CM

## 2017-02-17 RX ORDER — LEVOFLOXACIN 500 MG/1
500 TABLET, FILM COATED ORAL DAILY
Qty: 7 | Refills: 0 | Status: ACTIVE | COMMUNITY
Start: 2017-02-17 | End: 1900-01-01

## 2017-02-21 ENCOUNTER — RESULT REVIEW (OUTPATIENT)
Age: 82
End: 2017-02-21

## 2017-02-22 ENCOUNTER — APPOINTMENT (OUTPATIENT)
Dept: PULMONOLOGY | Facility: HOSPITAL | Age: 82
End: 2017-02-22

## 2017-02-22 ENCOUNTER — INPATIENT (INPATIENT)
Facility: HOSPITAL | Age: 82
LOS: 5 days | Discharge: HOME | End: 2017-02-28
Attending: INTERNAL MEDICINE | Admitting: INTERNAL MEDICINE
Payer: MEDICARE

## 2017-02-22 VITALS
HEART RATE: 99 BPM | DIASTOLIC BLOOD PRESSURE: 54 MMHG | TEMPERATURE: 98 F | HEIGHT: 67 IN | RESPIRATION RATE: 16 BRPM | SYSTOLIC BLOOD PRESSURE: 142 MMHG | WEIGHT: 134.92 LBS | OXYGEN SATURATION: 95 %

## 2017-02-22 DIAGNOSIS — I10 ESSENTIAL (PRIMARY) HYPERTENSION: ICD-10-CM

## 2017-02-22 DIAGNOSIS — E03.9 HYPOTHYROIDISM, UNSPECIFIED: ICD-10-CM

## 2017-02-22 DIAGNOSIS — N40.0 BENIGN PROSTATIC HYPERPLASIA WITHOUT LOWER URINARY TRACT SYMPTOMS: ICD-10-CM

## 2017-02-22 DIAGNOSIS — J90 PLEURAL EFFUSION, NOT ELSEWHERE CLASSIFIED: ICD-10-CM

## 2017-02-22 DIAGNOSIS — C91.10 CHRONIC LYMPHOCYTIC LEUKEMIA OF B-CELL TYPE NOT HAVING ACHIEVED REMISSION: ICD-10-CM

## 2017-02-22 DIAGNOSIS — E78.5 HYPERLIPIDEMIA, UNSPECIFIED: ICD-10-CM

## 2017-02-22 LAB
ALBUMIN FLD-MCNC: 2.3 G/DL — SIGNIFICANT CHANGE UP
BODY FLUID TYPE: SIGNIFICANT CHANGE UP
BUN SERPL-MCNC: 12 MG/DL — SIGNIFICANT CHANGE UP (ref 7–23)
CALCIUM SERPL-MCNC: 8.1 MG/DL — LOW (ref 8.4–10.5)
CHLORIDE SERPL-SCNC: 102 MMOL/L — SIGNIFICANT CHANGE UP (ref 98–107)
CLARITY SPEC: SIGNIFICANT CHANGE UP
CO2 SERPL-SCNC: 21 MMOL/L — LOW (ref 22–31)
COLOR FLD: YELLOW — SIGNIFICANT CHANGE UP
CREAT SERPL-MCNC: 1.03 MG/DL — SIGNIFICANT CHANGE UP (ref 0.5–1.3)
GLUCOSE FLD-MCNC: 71 MG/DL — SIGNIFICANT CHANGE UP
GLUCOSE SERPL-MCNC: 101 MG/DL — HIGH (ref 70–99)
GRAM STN FLD: SIGNIFICANT CHANGE UP
HCT VFR BLD CALC: 24.2 % — LOW (ref 39–50)
HGB BLD-MCNC: 7.1 G/DL — LOW (ref 13–17)
LDH SERPL L TO P-CCNC: 210 U/L — SIGNIFICANT CHANGE UP
LYMPHOCYTES NFR FLD: 72 % — SIGNIFICANT CHANGE UP
MAGNESIUM SERPL-MCNC: 1.4 MG/DL — LOW (ref 1.6–2.6)
MCHC RBC-ENTMCNC: 29.3 % — LOW (ref 32–36)
MCHC RBC-ENTMCNC: 32.7 PG — SIGNIFICANT CHANGE UP (ref 27–34)
MCV RBC AUTO: 111.5 FL — HIGH (ref 80–100)
MONOCYTES # FLD: 26 % — SIGNIFICANT CHANGE UP
NEUTS SEG NFR FLD MANUAL: 2 % — SIGNIFICANT CHANGE UP
PHOSPHATE SERPL-MCNC: 3 MG/DL — SIGNIFICANT CHANGE UP (ref 2.5–4.5)
PLATELET # BLD AUTO: 74 K/UL — LOW (ref 150–400)
PMV BLD: 9.1 FL — SIGNIFICANT CHANGE UP (ref 7–13)
POTASSIUM SERPL-MCNC: 3.9 MMOL/L — SIGNIFICANT CHANGE UP (ref 3.5–5.3)
POTASSIUM SERPL-SCNC: 3.9 MMOL/L — SIGNIFICANT CHANGE UP (ref 3.5–5.3)
PROT FLD-MCNC: 2.8 G/DL — SIGNIFICANT CHANGE UP
RBC # BLD: 2.17 M/UL — LOW (ref 4.2–5.8)
RBC # FLD: 18.6 % — HIGH (ref 10.3–14.5)
RCV VOL RI: 2000 CELL/UL — HIGH (ref 0–5)
SODIUM SERPL-SCNC: 138 MMOL/L — SIGNIFICANT CHANGE UP (ref 135–145)
SPECIMEN SOURCE: SIGNIFICANT CHANGE UP
TOTAL CELLS COUNTED, BODY FLUID: 100 CELLS — SIGNIFICANT CHANGE UP
TOTAL NUCLEATED CELL COUNT, BODY FLUID: 1330 CELL/UL — HIGH (ref 0–5)
WBC # BLD: 69.86 K/UL — CRITICAL HIGH (ref 3.8–10.5)
WBC # FLD AUTO: 69.86 K/UL — CRITICAL HIGH (ref 3.8–10.5)

## 2017-02-22 PROCEDURE — 88342 IMHCHEM/IMCYTCHM 1ST ANTB: CPT | Mod: 26,59

## 2017-02-22 PROCEDURE — 88112 CYTOPATH CELL ENHANCE TECH: CPT | Mod: 26

## 2017-02-22 PROCEDURE — 88305 TISSUE EXAM BY PATHOLOGIST: CPT | Mod: 26

## 2017-02-22 PROCEDURE — 88360 TUMOR IMMUNOHISTOCHEM/MANUAL: CPT | Mod: 26

## 2017-02-22 PROCEDURE — 71010: CPT | Mod: 26

## 2017-02-22 PROCEDURE — 88341 IMHCHEM/IMCYTCHM EA ADD ANTB: CPT | Mod: 26,59

## 2017-02-22 RX ORDER — HEPARIN SODIUM 5000 [USP'U]/ML
5000 INJECTION INTRAVENOUS; SUBCUTANEOUS EVERY 12 HOURS
Qty: 0 | Refills: 0 | Status: DISCONTINUED | OUTPATIENT
Start: 2017-02-22 | End: 2017-02-28

## 2017-02-22 RX ORDER — PIPERACILLIN AND TAZOBACTAM 4; .5 G/20ML; G/20ML
3.38 INJECTION, POWDER, LYOPHILIZED, FOR SOLUTION INTRAVENOUS EVERY 8 HOURS
Qty: 0 | Refills: 0 | Status: DISCONTINUED | OUTPATIENT
Start: 2017-02-22 | End: 2017-02-28

## 2017-02-22 RX ORDER — SODIUM CHLORIDE 9 MG/ML
3 INJECTION INTRAMUSCULAR; INTRAVENOUS; SUBCUTANEOUS EVERY 8 HOURS
Qty: 0 | Refills: 0 | Status: DISCONTINUED | OUTPATIENT
Start: 2017-02-22 | End: 2017-02-28

## 2017-02-22 RX ORDER — ALLOPURINOL 300 MG
300 TABLET ORAL DAILY
Qty: 0 | Refills: 0 | Status: DISCONTINUED | OUTPATIENT
Start: 2017-02-22 | End: 2017-02-28

## 2017-02-22 RX ORDER — PIPERACILLIN AND TAZOBACTAM 4; .5 G/20ML; G/20ML
3.38 INJECTION, POWDER, LYOPHILIZED, FOR SOLUTION INTRAVENOUS ONCE
Qty: 0 | Refills: 0 | Status: COMPLETED | OUTPATIENT
Start: 2017-02-22 | End: 2017-02-22

## 2017-02-22 RX ORDER — LEVOTHYROXINE SODIUM 125 MCG
100 TABLET ORAL DAILY
Qty: 0 | Refills: 0 | Status: DISCONTINUED | OUTPATIENT
Start: 2017-02-22 | End: 2017-02-28

## 2017-02-22 RX ORDER — ATORVASTATIN CALCIUM 80 MG/1
20 TABLET, FILM COATED ORAL AT BEDTIME
Qty: 0 | Refills: 0 | Status: DISCONTINUED | OUTPATIENT
Start: 2017-02-22 | End: 2017-02-28

## 2017-02-22 RX ORDER — FINASTERIDE 5 MG/1
5 TABLET, FILM COATED ORAL DAILY
Qty: 0 | Refills: 0 | Status: DISCONTINUED | OUTPATIENT
Start: 2017-02-22 | End: 2017-02-28

## 2017-02-22 RX ORDER — AMLODIPINE BESYLATE 2.5 MG/1
10 TABLET ORAL DAILY
Qty: 0 | Refills: 0 | Status: DISCONTINUED | OUTPATIENT
Start: 2017-02-22 | End: 2017-02-28

## 2017-02-22 RX ADMIN — SODIUM CHLORIDE 3 MILLILITER(S): 9 INJECTION INTRAMUSCULAR; INTRAVENOUS; SUBCUTANEOUS at 22:46

## 2017-02-22 RX ADMIN — PIPERACILLIN AND TAZOBACTAM 200 GRAM(S): 4; .5 INJECTION, POWDER, LYOPHILIZED, FOR SOLUTION INTRAVENOUS at 16:32

## 2017-02-22 RX ADMIN — HEPARIN SODIUM 5000 UNIT(S): 5000 INJECTION INTRAVENOUS; SUBCUTANEOUS at 17:38

## 2017-02-22 RX ADMIN — PIPERACILLIN AND TAZOBACTAM 25 GRAM(S): 4; .5 INJECTION, POWDER, LYOPHILIZED, FOR SOLUTION INTRAVENOUS at 22:38

## 2017-02-22 RX ADMIN — FINASTERIDE 5 MILLIGRAM(S): 5 TABLET, FILM COATED ORAL at 15:37

## 2017-02-22 NOTE — H&P ADULT. - ASSESSMENT
This is a 86 year old male with complex right pleural effusion with pigtail in place admitted for the MIST protocol and intravenous antibiotics.

## 2017-02-22 NOTE — H&P ADULT. - HISTORY OF PRESENT ILLNESS
86 year old male with chronic  lymphocytic leukemia complicated  with right pleural infection and recurrent pleural  effusion. Admitted for  Pigtail catheter placement 86 year old male with chronic  lymphocytic leukemia complicated  with right pleural infection and recurrent pleural  effusion who came to ambulatory surgery for drainage of his complicated effusion. On putting in a pigtain, only 100cc of jelly like serosanguinous fluid was obtained. Thus the patient was admitted for the MIST protocol given the complexity of the effusion and for intravenous antibiotics. The patient recnetly was admitted to the hospital 4 weeks ago where he had 2 pigtail catheters placed. 1/2 grew pseudomonas int he culture. The patient has had a recurrent of the complex pleural effusion. Previously the fluid was exudative in nature and was considered either infectious etiology vs CLL.

## 2017-02-22 NOTE — H&P ADULT. - PROBLEM SELECTOR PLAN 1
The patient will be admitted to the PCU. His antibiotic coverage will be switched to cover pseudomonas and anaerobes. Repeat fluid samples were sent to the lab including cultures. Will also start patient on MIST protocol., TPA & Dornase alpha intrapleurally, once a day. Obtain chest xray.

## 2017-02-22 NOTE — H&P ADULT. - RS GEN PE MLT RESP DETAILS PC
Reduced breath sounds on the right side. Chest tube in place./good air movement/respirations non-labored/no intercostal retractions/no wheezes

## 2017-02-23 LAB
ALBUMIN SERPL ELPH-MCNC: 3.2 G/DL — LOW (ref 3.3–5)
ALP SERPL-CCNC: 74 U/L — SIGNIFICANT CHANGE UP (ref 40–120)
ALT FLD-CCNC: 6 U/L — SIGNIFICANT CHANGE UP (ref 4–41)
ANTIBODY ID 1_1: SIGNIFICANT CHANGE UP
AST SERPL-CCNC: 13 U/L — SIGNIFICANT CHANGE UP (ref 4–40)
BASOPHILS # BLD AUTO: 0.08 K/UL — SIGNIFICANT CHANGE UP (ref 0–0.2)
BASOPHILS NFR BLD AUTO: 0.1 % — SIGNIFICANT CHANGE UP (ref 0–2)
BASOPHILS NFR SPEC: 0 % — SIGNIFICANT CHANGE UP (ref 0–2)
BILIRUB SERPL-MCNC: 1 MG/DL — SIGNIFICANT CHANGE UP (ref 0.2–1.2)
BLASTS # FLD: 0 % — SIGNIFICANT CHANGE UP (ref 0–0)
BLD GP AB SCN SERPL QL: POSITIVE — SIGNIFICANT CHANGE UP
BUN SERPL-MCNC: 11 MG/DL — SIGNIFICANT CHANGE UP (ref 7–23)
CALCIUM SERPL-MCNC: 8.1 MG/DL — LOW (ref 8.4–10.5)
CHLORIDE SERPL-SCNC: 105 MMOL/L — SIGNIFICANT CHANGE UP (ref 98–107)
CO2 SERPL-SCNC: 20 MMOL/L — LOW (ref 22–31)
CREAT SERPL-MCNC: 1.22 MG/DL — SIGNIFICANT CHANGE UP (ref 0.5–1.3)
CULTURE - ACID FAST SMEAR CONCENTRATED: SIGNIFICANT CHANGE UP
DAT C3-SP REAG RBC QL: POSITIVE — SIGNIFICANT CHANGE UP
DAT POLY-SP REAG RBC QL: POSITIVE — SIGNIFICANT CHANGE UP
DIRECT COOMBS IGG: POSITIVE — SIGNIFICANT CHANGE UP
EOSINOPHIL # BLD AUTO: 0.18 K/UL — SIGNIFICANT CHANGE UP (ref 0–0.5)
EOSINOPHIL NFR BLD AUTO: 0.3 % — SIGNIFICANT CHANGE UP (ref 0–6)
EOSINOPHIL NFR FLD: 0 % — SIGNIFICANT CHANGE UP (ref 0–6)
GIANT PLATELETS BLD QL SMEAR: PRESENT — SIGNIFICANT CHANGE UP
GLUCOSE SERPL-MCNC: 85 MG/DL — SIGNIFICANT CHANGE UP (ref 70–99)
HCT VFR BLD CALC: 22.7 % — LOW (ref 39–50)
HCT VFR BLD CALC: 25.1 % — LOW (ref 39–50)
HGB BLD-MCNC: 6.6 G/DL — CRITICAL LOW (ref 13–17)
HGB BLD-MCNC: 7.3 G/DL — LOW (ref 13–17)
HYPOCHROMIA BLD QL: SIGNIFICANT CHANGE UP
IMM GRANULOCYTES NFR BLD AUTO: 0.2 % — SIGNIFICANT CHANGE UP (ref 0–1.5)
LYMPHOCYTES # BLD AUTO: 60.72 K/UL — HIGH (ref 1–3.3)
LYMPHOCYTES # BLD AUTO: 89.6 % — HIGH (ref 13–44)
LYMPHOCYTES NFR SPEC AUTO: 71.7 % — HIGH (ref 13–44)
MACROCYTES BLD QL: SIGNIFICANT CHANGE UP
MCHC RBC-ENTMCNC: 29.1 % — LOW (ref 32–36)
MCHC RBC-ENTMCNC: 29.1 % — LOW (ref 32–36)
MCHC RBC-ENTMCNC: 32 PG — SIGNIFICANT CHANGE UP (ref 27–34)
MCHC RBC-ENTMCNC: 32.2 PG — SIGNIFICANT CHANGE UP (ref 27–34)
MCV RBC AUTO: 110.2 FL — HIGH (ref 80–100)
MCV RBC AUTO: 110.6 FL — HIGH (ref 80–100)
METAMYELOCYTES # FLD: 0 % — SIGNIFICANT CHANGE UP (ref 0–1)
MICROCYTES BLD QL: SIGNIFICANT CHANGE UP
MONOCYTES # BLD AUTO: 0.94 K/UL — HIGH (ref 0–0.9)
MONOCYTES NFR BLD AUTO: 1.4 % — LOW (ref 2–14)
MONOCYTES NFR BLD: 0 % — LOW (ref 2–9)
MYELOCYTES NFR BLD: 0 % — SIGNIFICANT CHANGE UP (ref 0–0)
NEUTROPHIL AB SER-ACNC: 23.9 % — LOW (ref 43–77)
NEUTROPHILS # BLD AUTO: 5.7 K/UL — SIGNIFICANT CHANGE UP (ref 1.8–7.4)
NEUTROPHILS NFR BLD AUTO: 8.4 % — LOW (ref 43–77)
NEUTS BAND # BLD: 0 % — SIGNIFICANT CHANGE UP (ref 0–6)
OTHER - HEMATOLOGY %: 0 — SIGNIFICANT CHANGE UP
PLATELET # BLD AUTO: 69 K/UL — LOW (ref 150–400)
PLATELET # BLD AUTO: 78 K/UL — LOW (ref 150–400)
PLATELET COUNT - ESTIMATE: SIGNIFICANT CHANGE UP
PMV BLD: 8.8 FL — SIGNIFICANT CHANGE UP (ref 7–13)
PMV BLD: 9.2 FL — SIGNIFICANT CHANGE UP (ref 7–13)
POLYCHROMASIA BLD QL SMEAR: SLIGHT — SIGNIFICANT CHANGE UP
POTASSIUM SERPL-MCNC: 4.2 MMOL/L — SIGNIFICANT CHANGE UP (ref 3.5–5.3)
POTASSIUM SERPL-SCNC: 4.2 MMOL/L — SIGNIFICANT CHANGE UP (ref 3.5–5.3)
PROMYELOCYTES # FLD: 0 % — SIGNIFICANT CHANGE UP (ref 0–0)
PROT SERPL-MCNC: 4.7 G/DL — LOW (ref 6–8.3)
RBC # BLD: 2.06 M/UL — LOW (ref 4.2–5.8)
RBC # BLD: 2.27 M/UL — LOW (ref 4.2–5.8)
RBC # FLD: 18.2 % — HIGH (ref 10.3–14.5)
RBC # FLD: 18.4 % — HIGH (ref 10.3–14.5)
RH IG SCN BLD-IMP: POSITIVE — SIGNIFICANT CHANGE UP
SODIUM SERPL-SCNC: 141 MMOL/L — SIGNIFICANT CHANGE UP (ref 135–145)
SPECIMEN SOURCE: SIGNIFICANT CHANGE UP
SPECIMEN SOURCE: SIGNIFICANT CHANGE UP
VARIANT LYMPHS # BLD: 3.3 % — SIGNIFICANT CHANGE UP
WBC # BLD: 67.74 K/UL — CRITICAL HIGH (ref 3.8–10.5)
WBC # BLD: 77.82 K/UL — CRITICAL HIGH (ref 3.8–10.5)
WBC # FLD AUTO: 67.74 K/UL — CRITICAL HIGH (ref 3.8–10.5)
WBC # FLD AUTO: 77.82 K/UL — CRITICAL HIGH (ref 3.8–10.5)

## 2017-02-23 PROCEDURE — 86077 PHYS BLOOD BANK SERV XMATCH: CPT

## 2017-02-23 RX ADMIN — AMLODIPINE BESYLATE 10 MILLIGRAM(S): 2.5 TABLET ORAL at 06:39

## 2017-02-23 RX ADMIN — FINASTERIDE 5 MILLIGRAM(S): 5 TABLET, FILM COATED ORAL at 11:28

## 2017-02-23 RX ADMIN — Medication 300 MILLIGRAM(S): at 11:28

## 2017-02-23 RX ADMIN — PIPERACILLIN AND TAZOBACTAM 25 GRAM(S): 4; .5 INJECTION, POWDER, LYOPHILIZED, FOR SOLUTION INTRAVENOUS at 15:13

## 2017-02-23 RX ADMIN — PIPERACILLIN AND TAZOBACTAM 25 GRAM(S): 4; .5 INJECTION, POWDER, LYOPHILIZED, FOR SOLUTION INTRAVENOUS at 23:08

## 2017-02-23 RX ADMIN — SODIUM CHLORIDE 3 MILLILITER(S): 9 INJECTION INTRAMUSCULAR; INTRAVENOUS; SUBCUTANEOUS at 06:44

## 2017-02-23 RX ADMIN — HEPARIN SODIUM 5000 UNIT(S): 5000 INJECTION INTRAVENOUS; SUBCUTANEOUS at 17:32

## 2017-02-23 RX ADMIN — PIPERACILLIN AND TAZOBACTAM 25 GRAM(S): 4; .5 INJECTION, POWDER, LYOPHILIZED, FOR SOLUTION INTRAVENOUS at 06:39

## 2017-02-23 RX ADMIN — Medication 100 MICROGRAM(S): at 06:39

## 2017-02-23 RX ADMIN — SODIUM CHLORIDE 3 MILLILITER(S): 9 INJECTION INTRAMUSCULAR; INTRAVENOUS; SUBCUTANEOUS at 23:14

## 2017-02-23 RX ADMIN — HEPARIN SODIUM 5000 UNIT(S): 5000 INJECTION INTRAVENOUS; SUBCUTANEOUS at 06:39

## 2017-02-23 RX ADMIN — SODIUM CHLORIDE 3 MILLILITER(S): 9 INJECTION INTRAMUSCULAR; INTRAVENOUS; SUBCUTANEOUS at 14:34

## 2017-02-24 LAB
ANISOCYTOSIS BLD QL: SLIGHT — SIGNIFICANT CHANGE UP
BASOPHILS NFR SPEC: 0 % — SIGNIFICANT CHANGE UP (ref 0–2)
EOSINOPHIL NFR FLD: 0 % — SIGNIFICANT CHANGE UP (ref 0–6)
HCT VFR BLD CALC: 24.8 % — LOW (ref 39–50)
HGB BLD-MCNC: 7.3 G/DL — LOW (ref 13–17)
LYMPHOCYTES NFR SPEC AUTO: 82 % — HIGH (ref 13–44)
MANUAL SMEAR VERIFICATION: SIGNIFICANT CHANGE UP
MCHC RBC-ENTMCNC: 29.4 % — LOW (ref 32–36)
MCHC RBC-ENTMCNC: 32.6 PG — SIGNIFICANT CHANGE UP (ref 27–34)
MCV RBC AUTO: 110.7 FL — HIGH (ref 80–100)
MICROCYTES BLD QL: SLIGHT — SIGNIFICANT CHANGE UP
MONOCYTES NFR BLD: 3 % — SIGNIFICANT CHANGE UP (ref 2–9)
NEUTROPHIL AB SER-ACNC: 9 % — LOW (ref 43–77)
PH FLD: 8 PH — SIGNIFICANT CHANGE UP
PLATELET # BLD AUTO: 77 K/UL — LOW (ref 150–400)
PLATELET COUNT - ESTIMATE: SIGNIFICANT CHANGE UP
PMV BLD: 9.4 FL — SIGNIFICANT CHANGE UP (ref 7–13)
RBC # BLD: 2.24 M/UL — LOW (ref 4.2–5.8)
RBC # FLD: 17.9 % — HIGH (ref 10.3–14.5)
VARIANT LYMPHS # BLD: 6 % — SIGNIFICANT CHANGE UP
WBC # BLD: 72.5 K/UL — CRITICAL HIGH (ref 3.8–10.5)
WBC # FLD AUTO: 72.5 K/UL — CRITICAL HIGH (ref 3.8–10.5)

## 2017-02-24 RX ADMIN — PIPERACILLIN AND TAZOBACTAM 25 GRAM(S): 4; .5 INJECTION, POWDER, LYOPHILIZED, FOR SOLUTION INTRAVENOUS at 21:26

## 2017-02-24 RX ADMIN — SODIUM CHLORIDE 3 MILLILITER(S): 9 INJECTION INTRAMUSCULAR; INTRAVENOUS; SUBCUTANEOUS at 06:22

## 2017-02-24 RX ADMIN — HEPARIN SODIUM 5000 UNIT(S): 5000 INJECTION INTRAVENOUS; SUBCUTANEOUS at 17:34

## 2017-02-24 RX ADMIN — PIPERACILLIN AND TAZOBACTAM 25 GRAM(S): 4; .5 INJECTION, POWDER, LYOPHILIZED, FOR SOLUTION INTRAVENOUS at 06:13

## 2017-02-24 RX ADMIN — SODIUM CHLORIDE 3 MILLILITER(S): 9 INJECTION INTRAMUSCULAR; INTRAVENOUS; SUBCUTANEOUS at 21:27

## 2017-02-24 RX ADMIN — AMLODIPINE BESYLATE 10 MILLIGRAM(S): 2.5 TABLET ORAL at 06:13

## 2017-02-24 RX ADMIN — Medication 300 MILLIGRAM(S): at 11:22

## 2017-02-24 RX ADMIN — ATORVASTATIN CALCIUM 20 MILLIGRAM(S): 80 TABLET, FILM COATED ORAL at 21:26

## 2017-02-24 RX ADMIN — PIPERACILLIN AND TAZOBACTAM 25 GRAM(S): 4; .5 INJECTION, POWDER, LYOPHILIZED, FOR SOLUTION INTRAVENOUS at 13:32

## 2017-02-24 RX ADMIN — HEPARIN SODIUM 5000 UNIT(S): 5000 INJECTION INTRAVENOUS; SUBCUTANEOUS at 06:13

## 2017-02-24 RX ADMIN — FINASTERIDE 5 MILLIGRAM(S): 5 TABLET, FILM COATED ORAL at 11:22

## 2017-02-24 RX ADMIN — SODIUM CHLORIDE 3 MILLILITER(S): 9 INJECTION INTRAMUSCULAR; INTRAVENOUS; SUBCUTANEOUS at 13:16

## 2017-02-24 RX ADMIN — Medication 100 MICROGRAM(S): at 06:13

## 2017-02-25 LAB
BUN SERPL-MCNC: 13 MG/DL — SIGNIFICANT CHANGE UP (ref 7–23)
CALCIUM SERPL-MCNC: 7.6 MG/DL — LOW (ref 8.4–10.5)
CHLORIDE SERPL-SCNC: 107 MMOL/L — SIGNIFICANT CHANGE UP (ref 98–107)
CO2 SERPL-SCNC: 21 MMOL/L — LOW (ref 22–31)
CREAT SERPL-MCNC: 1.21 MG/DL — SIGNIFICANT CHANGE UP (ref 0.5–1.3)
GLUCOSE SERPL-MCNC: 84 MG/DL — SIGNIFICANT CHANGE UP (ref 70–99)
HCT VFR BLD CALC: 24.9 % — LOW (ref 39–50)
HGB BLD-MCNC: 7.3 G/DL — LOW (ref 13–17)
MAGNESIUM SERPL-MCNC: 1.5 MG/DL — LOW (ref 1.6–2.6)
MCHC RBC-ENTMCNC: 29.3 % — LOW (ref 32–36)
MCHC RBC-ENTMCNC: 32.9 PG — SIGNIFICANT CHANGE UP (ref 27–34)
MCV RBC AUTO: 112.2 FL — HIGH (ref 80–100)
PHOSPHATE SERPL-MCNC: 3.3 MG/DL — SIGNIFICANT CHANGE UP (ref 2.5–4.5)
PLATELET # BLD AUTO: 67 K/UL — LOW (ref 150–400)
PMV BLD: 9.5 FL — SIGNIFICANT CHANGE UP (ref 7–13)
POTASSIUM SERPL-MCNC: 4.4 MMOL/L — SIGNIFICANT CHANGE UP (ref 3.5–5.3)
POTASSIUM SERPL-SCNC: 4.4 MMOL/L — SIGNIFICANT CHANGE UP (ref 3.5–5.3)
RBC # BLD: 2.22 M/UL — LOW (ref 4.2–5.8)
RBC # FLD: 17.6 % — HIGH (ref 10.3–14.5)
SODIUM SERPL-SCNC: 143 MMOL/L — SIGNIFICANT CHANGE UP (ref 135–145)
WBC # BLD: 59.95 K/UL — CRITICAL HIGH (ref 3.8–10.5)
WBC # FLD AUTO: 59.95 K/UL — CRITICAL HIGH (ref 3.8–10.5)

## 2017-02-25 PROCEDURE — 99233 SBSQ HOSP IP/OBS HIGH 50: CPT

## 2017-02-25 RX ORDER — MAGNESIUM OXIDE 400 MG ORAL TABLET 241.3 MG
400 TABLET ORAL
Qty: 0 | Refills: 0 | Status: COMPLETED | OUTPATIENT
Start: 2017-02-25 | End: 2017-02-27

## 2017-02-25 RX ADMIN — Medication 300 MILLIGRAM(S): at 12:10

## 2017-02-25 RX ADMIN — HEPARIN SODIUM 5000 UNIT(S): 5000 INJECTION INTRAVENOUS; SUBCUTANEOUS at 05:28

## 2017-02-25 RX ADMIN — HEPARIN SODIUM 5000 UNIT(S): 5000 INJECTION INTRAVENOUS; SUBCUTANEOUS at 18:15

## 2017-02-25 RX ADMIN — SODIUM CHLORIDE 3 MILLILITER(S): 9 INJECTION INTRAMUSCULAR; INTRAVENOUS; SUBCUTANEOUS at 21:29

## 2017-02-25 RX ADMIN — PIPERACILLIN AND TAZOBACTAM 25 GRAM(S): 4; .5 INJECTION, POWDER, LYOPHILIZED, FOR SOLUTION INTRAVENOUS at 21:31

## 2017-02-25 RX ADMIN — Medication 100 MICROGRAM(S): at 05:28

## 2017-02-25 RX ADMIN — PIPERACILLIN AND TAZOBACTAM 25 GRAM(S): 4; .5 INJECTION, POWDER, LYOPHILIZED, FOR SOLUTION INTRAVENOUS at 05:28

## 2017-02-25 RX ADMIN — FINASTERIDE 5 MILLIGRAM(S): 5 TABLET, FILM COATED ORAL at 12:10

## 2017-02-25 RX ADMIN — AMLODIPINE BESYLATE 10 MILLIGRAM(S): 2.5 TABLET ORAL at 05:28

## 2017-02-25 RX ADMIN — MAGNESIUM OXIDE 400 MG ORAL TABLET 400 MILLIGRAM(S): 241.3 TABLET ORAL at 18:15

## 2017-02-25 RX ADMIN — PIPERACILLIN AND TAZOBACTAM 25 GRAM(S): 4; .5 INJECTION, POWDER, LYOPHILIZED, FOR SOLUTION INTRAVENOUS at 15:09

## 2017-02-25 RX ADMIN — SODIUM CHLORIDE 3 MILLILITER(S): 9 INJECTION INTRAMUSCULAR; INTRAVENOUS; SUBCUTANEOUS at 14:22

## 2017-02-25 RX ADMIN — SODIUM CHLORIDE 3 MILLILITER(S): 9 INJECTION INTRAMUSCULAR; INTRAVENOUS; SUBCUTANEOUS at 05:13

## 2017-02-25 RX ADMIN — ATORVASTATIN CALCIUM 20 MILLIGRAM(S): 80 TABLET, FILM COATED ORAL at 21:31

## 2017-02-26 LAB
BASOPHILS NFR SPEC: 0 % — SIGNIFICANT CHANGE UP (ref 0–2)
BUN SERPL-MCNC: 26 MG/DL — HIGH (ref 7–23)
CALCIUM SERPL-MCNC: 7.8 MG/DL — LOW (ref 8.4–10.5)
CHLORIDE SERPL-SCNC: 104 MMOL/L — SIGNIFICANT CHANGE UP (ref 98–107)
CO2 SERPL-SCNC: 20 MMOL/L — LOW (ref 22–31)
CREAT SERPL-MCNC: 0.57 MG/DL — SIGNIFICANT CHANGE UP (ref 0.5–1.3)
EOSINOPHIL NFR FLD: 1 % — SIGNIFICANT CHANGE UP (ref 0–6)
GLUCOSE SERPL-MCNC: 79 MG/DL — SIGNIFICANT CHANGE UP (ref 70–99)
HCT VFR BLD CALC: 26.7 % — LOW (ref 39–50)
HGB BLD-MCNC: 7.8 G/DL — LOW (ref 13–17)
LYMPHOCYTES NFR SPEC AUTO: 93 % — HIGH (ref 13–44)
MAGNESIUM SERPL-MCNC: 1.6 MG/DL — SIGNIFICANT CHANGE UP (ref 1.6–2.6)
MCHC RBC-ENTMCNC: 29.2 % — LOW (ref 32–36)
MCHC RBC-ENTMCNC: 32.1 PG — SIGNIFICANT CHANGE UP (ref 27–34)
MCV RBC AUTO: 109.9 FL — HIGH (ref 80–100)
MONOCYTES NFR BLD: 1 % — LOW (ref 2–9)
NEUTROPHIL AB SER-ACNC: 5 % — LOW (ref 43–77)
PHOSPHATE SERPL-MCNC: 2.6 MG/DL — SIGNIFICANT CHANGE UP (ref 2.5–4.5)
PLATELET # BLD AUTO: 56 K/UL — LOW (ref 150–400)
PMV BLD: 10.1 FL — SIGNIFICANT CHANGE UP (ref 7–13)
POTASSIUM SERPL-MCNC: 5.2 MMOL/L — SIGNIFICANT CHANGE UP (ref 3.5–5.3)
POTASSIUM SERPL-SCNC: 5.2 MMOL/L — SIGNIFICANT CHANGE UP (ref 3.5–5.3)
RBC # BLD: 2.43 M/UL — LOW (ref 4.2–5.8)
RBC # FLD: 17 % — HIGH (ref 10.3–14.5)
SODIUM SERPL-SCNC: 140 MMOL/L — SIGNIFICANT CHANGE UP (ref 135–145)
WBC # BLD: 61.19 K/UL — CRITICAL HIGH (ref 3.8–10.5)
WBC # FLD AUTO: 61.19 K/UL — CRITICAL HIGH (ref 3.8–10.5)

## 2017-02-26 PROCEDURE — 99233 SBSQ HOSP IP/OBS HIGH 50: CPT

## 2017-02-26 RX ORDER — ACETAMINOPHEN 500 MG
650 TABLET ORAL EVERY 6 HOURS
Qty: 0 | Refills: 0 | Status: DISCONTINUED | OUTPATIENT
Start: 2017-02-26 | End: 2017-02-28

## 2017-02-26 RX ADMIN — MAGNESIUM OXIDE 400 MG ORAL TABLET 400 MILLIGRAM(S): 241.3 TABLET ORAL at 14:21

## 2017-02-26 RX ADMIN — ATORVASTATIN CALCIUM 20 MILLIGRAM(S): 80 TABLET, FILM COATED ORAL at 22:57

## 2017-02-26 RX ADMIN — Medication 100 MICROGRAM(S): at 05:16

## 2017-02-26 RX ADMIN — HEPARIN SODIUM 5000 UNIT(S): 5000 INJECTION INTRAVENOUS; SUBCUTANEOUS at 17:43

## 2017-02-26 RX ADMIN — MAGNESIUM OXIDE 400 MG ORAL TABLET 400 MILLIGRAM(S): 241.3 TABLET ORAL at 09:03

## 2017-02-26 RX ADMIN — MAGNESIUM OXIDE 400 MG ORAL TABLET 400 MILLIGRAM(S): 241.3 TABLET ORAL at 17:43

## 2017-02-26 RX ADMIN — HEPARIN SODIUM 5000 UNIT(S): 5000 INJECTION INTRAVENOUS; SUBCUTANEOUS at 05:16

## 2017-02-26 RX ADMIN — SODIUM CHLORIDE 3 MILLILITER(S): 9 INJECTION INTRAMUSCULAR; INTRAVENOUS; SUBCUTANEOUS at 22:56

## 2017-02-26 RX ADMIN — SODIUM CHLORIDE 3 MILLILITER(S): 9 INJECTION INTRAMUSCULAR; INTRAVENOUS; SUBCUTANEOUS at 05:17

## 2017-02-26 RX ADMIN — AMLODIPINE BESYLATE 10 MILLIGRAM(S): 2.5 TABLET ORAL at 05:16

## 2017-02-26 RX ADMIN — Medication 300 MILLIGRAM(S): at 14:21

## 2017-02-26 RX ADMIN — PIPERACILLIN AND TAZOBACTAM 25 GRAM(S): 4; .5 INJECTION, POWDER, LYOPHILIZED, FOR SOLUTION INTRAVENOUS at 05:17

## 2017-02-26 RX ADMIN — FINASTERIDE 5 MILLIGRAM(S): 5 TABLET, FILM COATED ORAL at 14:21

## 2017-02-26 RX ADMIN — PIPERACILLIN AND TAZOBACTAM 25 GRAM(S): 4; .5 INJECTION, POWDER, LYOPHILIZED, FOR SOLUTION INTRAVENOUS at 22:57

## 2017-02-26 RX ADMIN — SODIUM CHLORIDE 3 MILLILITER(S): 9 INJECTION INTRAMUSCULAR; INTRAVENOUS; SUBCUTANEOUS at 14:21

## 2017-02-26 RX ADMIN — PIPERACILLIN AND TAZOBACTAM 25 GRAM(S): 4; .5 INJECTION, POWDER, LYOPHILIZED, FOR SOLUTION INTRAVENOUS at 14:21

## 2017-02-27 LAB
BUN SERPL-MCNC: 10 MG/DL — SIGNIFICANT CHANGE UP (ref 7–23)
CALCIUM SERPL-MCNC: 8 MG/DL — LOW (ref 8.4–10.5)
CHLORIDE SERPL-SCNC: 104 MMOL/L — SIGNIFICANT CHANGE UP (ref 98–107)
CO2 SERPL-SCNC: 24 MMOL/L — SIGNIFICANT CHANGE UP (ref 22–31)
CREAT SERPL-MCNC: 1.06 MG/DL — SIGNIFICANT CHANGE UP (ref 0.5–1.3)
GLUCOSE SERPL-MCNC: 102 MG/DL — HIGH (ref 70–99)
HCT VFR BLD CALC: 25.7 % — LOW (ref 39–50)
HGB BLD-MCNC: 7.5 G/DL — LOW (ref 13–17)
MAGNESIUM SERPL-MCNC: 1.5 MG/DL — LOW (ref 1.6–2.6)
MCHC RBC-ENTMCNC: 29.2 % — LOW (ref 32–36)
MCHC RBC-ENTMCNC: 32.3 PG — SIGNIFICANT CHANGE UP (ref 27–34)
MCV RBC AUTO: 110.8 FL — HIGH (ref 80–100)
PHOSPHATE SERPL-MCNC: 2.2 MG/DL — LOW (ref 2.5–4.5)
PLATELET # BLD AUTO: 66 K/UL — LOW (ref 150–400)
PMV BLD: 9.1 FL — SIGNIFICANT CHANGE UP (ref 7–13)
POTASSIUM SERPL-MCNC: 4 MMOL/L — SIGNIFICANT CHANGE UP (ref 3.5–5.3)
POTASSIUM SERPL-SCNC: 4 MMOL/L — SIGNIFICANT CHANGE UP (ref 3.5–5.3)
RBC # BLD: 2.32 M/UL — LOW (ref 4.2–5.8)
RBC # FLD: 16.8 % — HIGH (ref 10.3–14.5)
SODIUM SERPL-SCNC: 142 MMOL/L — SIGNIFICANT CHANGE UP (ref 135–145)
WBC # BLD: 69.35 K/UL — CRITICAL HIGH (ref 3.8–10.5)
WBC # FLD AUTO: 69.35 K/UL — CRITICAL HIGH (ref 3.8–10.5)

## 2017-02-27 PROCEDURE — 99233 SBSQ HOSP IP/OBS HIGH 50: CPT | Mod: GC

## 2017-02-27 RX ORDER — MAGNESIUM SULFATE 500 MG/ML
1 VIAL (ML) INJECTION ONCE
Qty: 0 | Refills: 0 | Status: COMPLETED | OUTPATIENT
Start: 2017-02-27 | End: 2017-02-27

## 2017-02-27 RX ORDER — SODIUM,POTASSIUM PHOSPHATES 278-250MG
1 POWDER IN PACKET (EA) ORAL
Qty: 0 | Refills: 0 | Status: DISCONTINUED | OUTPATIENT
Start: 2017-02-27 | End: 2017-02-28

## 2017-02-27 RX ADMIN — FINASTERIDE 5 MILLIGRAM(S): 5 TABLET, FILM COATED ORAL at 12:02

## 2017-02-27 RX ADMIN — Medication 1 TABLET(S): at 12:02

## 2017-02-27 RX ADMIN — Medication 1 TABLET(S): at 17:21

## 2017-02-27 RX ADMIN — PIPERACILLIN AND TAZOBACTAM 25 GRAM(S): 4; .5 INJECTION, POWDER, LYOPHILIZED, FOR SOLUTION INTRAVENOUS at 05:01

## 2017-02-27 RX ADMIN — MAGNESIUM OXIDE 400 MG ORAL TABLET 400 MILLIGRAM(S): 241.3 TABLET ORAL at 12:02

## 2017-02-27 RX ADMIN — MAGNESIUM OXIDE 400 MG ORAL TABLET 400 MILLIGRAM(S): 241.3 TABLET ORAL at 17:21

## 2017-02-27 RX ADMIN — HEPARIN SODIUM 5000 UNIT(S): 5000 INJECTION INTRAVENOUS; SUBCUTANEOUS at 05:04

## 2017-02-27 RX ADMIN — AMLODIPINE BESYLATE 10 MILLIGRAM(S): 2.5 TABLET ORAL at 05:04

## 2017-02-27 RX ADMIN — SODIUM CHLORIDE 3 MILLILITER(S): 9 INJECTION INTRAMUSCULAR; INTRAVENOUS; SUBCUTANEOUS at 21:51

## 2017-02-27 RX ADMIN — Medication 100 GRAM(S): at 09:35

## 2017-02-27 RX ADMIN — ATORVASTATIN CALCIUM 20 MILLIGRAM(S): 80 TABLET, FILM COATED ORAL at 21:47

## 2017-02-27 RX ADMIN — PIPERACILLIN AND TAZOBACTAM 25 GRAM(S): 4; .5 INJECTION, POWDER, LYOPHILIZED, FOR SOLUTION INTRAVENOUS at 13:48

## 2017-02-27 RX ADMIN — Medication 100 MICROGRAM(S): at 05:04

## 2017-02-27 RX ADMIN — Medication 300 MILLIGRAM(S): at 12:02

## 2017-02-27 RX ADMIN — MAGNESIUM OXIDE 400 MG ORAL TABLET 400 MILLIGRAM(S): 241.3 TABLET ORAL at 09:34

## 2017-02-27 RX ADMIN — PIPERACILLIN AND TAZOBACTAM 25 GRAM(S): 4; .5 INJECTION, POWDER, LYOPHILIZED, FOR SOLUTION INTRAVENOUS at 21:47

## 2017-02-27 RX ADMIN — SODIUM CHLORIDE 3 MILLILITER(S): 9 INJECTION INTRAMUSCULAR; INTRAVENOUS; SUBCUTANEOUS at 05:00

## 2017-02-27 RX ADMIN — SODIUM CHLORIDE 3 MILLILITER(S): 9 INJECTION INTRAMUSCULAR; INTRAVENOUS; SUBCUTANEOUS at 12:59

## 2017-02-27 RX ADMIN — Medication 1 TABLET(S): at 09:35

## 2017-02-27 RX ADMIN — HEPARIN SODIUM 5000 UNIT(S): 5000 INJECTION INTRAVENOUS; SUBCUTANEOUS at 17:21

## 2017-02-27 NOTE — DIETITIAN INITIAL EVALUATION ADULT. - OTHER INFO
Nutrition assessment initiated for LOS, Pt. alert, oriented , reports good appetite, PO intake , some abdominal pain , no edema, no known food allergies , no difficulty chewing, swallowing , no recent wt. change , tolerating PO .

## 2017-02-28 ENCOUNTER — TRANSCRIPTION ENCOUNTER (OUTPATIENT)
Age: 82
End: 2017-02-28

## 2017-02-28 VITALS
RESPIRATION RATE: 16 BRPM | SYSTOLIC BLOOD PRESSURE: 129 MMHG | OXYGEN SATURATION: 97 % | HEART RATE: 86 BPM | TEMPERATURE: 98 F | DIASTOLIC BLOOD PRESSURE: 65 MMHG

## 2017-02-28 LAB
BACTERIA FLD CULT: SIGNIFICANT CHANGE UP
BUN SERPL-MCNC: 12 MG/DL — SIGNIFICANT CHANGE UP (ref 7–23)
CALCIUM SERPL-MCNC: 8 MG/DL — LOW (ref 8.4–10.5)
CHLORIDE SERPL-SCNC: 105 MMOL/L — SIGNIFICANT CHANGE UP (ref 98–107)
CO2 SERPL-SCNC: 22 MMOL/L — SIGNIFICANT CHANGE UP (ref 22–31)
CREAT SERPL-MCNC: 1.17 MG/DL — SIGNIFICANT CHANGE UP (ref 0.5–1.3)
GLUCOSE SERPL-MCNC: 91 MG/DL — SIGNIFICANT CHANGE UP (ref 70–99)
HCT VFR BLD CALC: 25.8 % — LOW (ref 39–50)
HGB BLD-MCNC: 7.7 G/DL — LOW (ref 13–17)
MAGNESIUM SERPL-MCNC: 1.8 MG/DL — SIGNIFICANT CHANGE UP (ref 1.6–2.6)
MCHC RBC-ENTMCNC: 29.8 % — LOW (ref 32–36)
MCHC RBC-ENTMCNC: 33 PG — SIGNIFICANT CHANGE UP (ref 27–34)
MCV RBC AUTO: 110.7 FL — HIGH (ref 80–100)
PHOSPHATE SERPL-MCNC: 3.1 MG/DL — SIGNIFICANT CHANGE UP (ref 2.5–4.5)
PLATELET # BLD AUTO: 67 K/UL — LOW (ref 150–400)
PMV BLD: 9.4 FL — SIGNIFICANT CHANGE UP (ref 7–13)
POTASSIUM SERPL-MCNC: 4.3 MMOL/L — SIGNIFICANT CHANGE UP (ref 3.5–5.3)
POTASSIUM SERPL-SCNC: 4.3 MMOL/L — SIGNIFICANT CHANGE UP (ref 3.5–5.3)
RBC # BLD: 2.33 M/UL — LOW (ref 4.2–5.8)
RBC # FLD: 16.8 % — HIGH (ref 10.3–14.5)
SODIUM SERPL-SCNC: 142 MMOL/L — SIGNIFICANT CHANGE UP (ref 135–145)
WBC # BLD: 60.44 K/UL — CRITICAL HIGH (ref 3.8–10.5)
WBC # FLD AUTO: 60.44 K/UL — CRITICAL HIGH (ref 3.8–10.5)

## 2017-02-28 PROCEDURE — 71010: CPT | Mod: 26,76

## 2017-02-28 PROCEDURE — 99238 HOSP IP/OBS DSCHRG MGMT 30/<: CPT

## 2017-02-28 RX ADMIN — SODIUM CHLORIDE 3 MILLILITER(S): 9 INJECTION INTRAMUSCULAR; INTRAVENOUS; SUBCUTANEOUS at 13:08

## 2017-02-28 RX ADMIN — Medication 1 TABLET(S): at 08:10

## 2017-02-28 RX ADMIN — HEPARIN SODIUM 5000 UNIT(S): 5000 INJECTION INTRAVENOUS; SUBCUTANEOUS at 05:38

## 2017-02-28 RX ADMIN — Medication 1 TABLET(S): at 17:09

## 2017-02-28 RX ADMIN — PIPERACILLIN AND TAZOBACTAM 25 GRAM(S): 4; .5 INJECTION, POWDER, LYOPHILIZED, FOR SOLUTION INTRAVENOUS at 13:11

## 2017-02-28 RX ADMIN — Medication 300 MILLIGRAM(S): at 11:47

## 2017-02-28 RX ADMIN — PIPERACILLIN AND TAZOBACTAM 25 GRAM(S): 4; .5 INJECTION, POWDER, LYOPHILIZED, FOR SOLUTION INTRAVENOUS at 05:38

## 2017-02-28 RX ADMIN — HEPARIN SODIUM 5000 UNIT(S): 5000 INJECTION INTRAVENOUS; SUBCUTANEOUS at 17:09

## 2017-02-28 RX ADMIN — Medication 1 TABLET(S): at 11:47

## 2017-02-28 RX ADMIN — FINASTERIDE 5 MILLIGRAM(S): 5 TABLET, FILM COATED ORAL at 11:47

## 2017-02-28 RX ADMIN — SODIUM CHLORIDE 3 MILLILITER(S): 9 INJECTION INTRAMUSCULAR; INTRAVENOUS; SUBCUTANEOUS at 05:38

## 2017-02-28 RX ADMIN — AMLODIPINE BESYLATE 10 MILLIGRAM(S): 2.5 TABLET ORAL at 05:38

## 2017-02-28 RX ADMIN — Medication 100 MICROGRAM(S): at 05:38

## 2017-02-28 NOTE — PROVIDER CONTACT NOTE (CRITICAL VALUE NOTIFICATION) - ACTION/TREATMENT ORDERED:
continue to monitor.
continue to monitor.
provider aware, no intervention needed at this time , continue to monitor
As white count is dropping, continue to monitor. Will continue to monitor.
NP notified, repeat labs ordered. will continue to monitor
No new orders at this time
Provider notified; continue to trend WBC

## 2017-02-28 NOTE — PROVIDER CONTACT NOTE (CRITICAL VALUE NOTIFICATION) - BACKGROUND
pt currently on zosyn abx, right chest tube in place. pt has a hx of CLL
Patient admitted with pleural effusion.
Pt with H/O CLL and increased WBC
admitted for pleural effusion.  history of Chronic lymphocytic leukemia
pt admitted with pleural eff, pmh of CLL
pt admitted with pleural eff, pmh of cll

## 2017-02-28 NOTE — DISCHARGE NOTE ADULT - CARE PLAN
Principal Discharge DX:	Pleural effusion  Goal:	management  Instructions for follow-up, activity and diet:	Please continue antibiotics as directed, until completed.  You had a chest tube while in the hospital for drainage. Please follow up with your doctor for further management.  Call your doctor if you experience increasing shortness of breath or chest pain.  Secondary Diagnosis:	CLL (chronic lymphocytic leukemia)  Goal:	management  Instructions for follow-up, activity and diet:	Please continue follow up with hematologist.  Secondary Diagnosis:	Adult Hypothyroidism  Goal:	management and control  Instructions for follow-up, activity and diet:	Continue medications as directed.

## 2017-02-28 NOTE — DISCHARGE NOTE ADULT - CARE PROVIDERS DIRECT ADDRESSES
[email or web address]

## 2017-02-28 NOTE — DISCHARGE NOTE ADULT - PATIENT PORTAL LINK FT
“You can access the Follow[employer] Patient Portal, offered by [facility], by registering with the following website: [email or web address]”

## 2017-02-28 NOTE — PHYSICAL THERAPY INITIAL EVALUATION ADULT - PERTINENT HX OF CURRENT PROBLEM, REHAB EVAL
86 year old male with chronic  lymphocytic leukemia complicated  with right pleural infection and recurrent pleural  effusion who came to ambulatory surgery for drainage of his complicated effusion.

## 2017-02-28 NOTE — PHYSICAL THERAPY INITIAL EVALUATION ADULT - PLANNED THERAPY INTERVENTIONS, PT EVAL
strengthening/Pt left sitting in chair in NAD; call bell in reach; +CT on water seal ; +IV/bed mobility training/balance training/transfer training

## 2017-02-28 NOTE — DISCHARGE NOTE ADULT - CARE PROVIDER_API CALL
Marita Vievros), Critical Care Medicine; Internal Medicine; Pulmonary Disease; Sleep Medicine  [address] [place] [address]  Phone: [phone]  Fax: [phone]    Lux Mathew), Hematology; Internal Medicine; Medical Oncology  [address] [place] [address]  Phone: [phone]  Fax: [phone]

## 2017-02-28 NOTE — DISCHARGE NOTE ADULT - MEDICATION SUMMARY - MEDICATIONS TO TAKE
I will START or STAY ON the medications listed below when I get home from the hospital:    Avodart 0.5 mg oral capsule  -- 1 cap(s) by mouth once a day  -- Indication: For Benign Prostatic Hypertrophy    allopurinol 300 mg oral tablet  -- 1 tab(s) by mouth once a day  -- Indication: For Gout    Lipitor 20 mg oral tablet  -- 1 tab(s) by mouth once a day (at bedtime)  -- Indication: For Hyperlipidemia    Stiolto Respimat 2.5 mcg-2.5 mcg inhalation aerosol  -- 2 puff(s) inhaled every 24 hours  -- Indication: For COPD    Norvasc 10 mg oral tablet  -- 1 tab(s) by mouth once a day  -- Indication: For Hypertension    Augmentin 875 mg-125 mg oral tablet  -- 875 milligram(s) by mouth every 12 hours  -- Finish all this medication unless otherwise directed by prescriber.  Take with food or milk.    -- Indication: For Pleural effusion    Synthroid 100 mcg (0.1 mg) oral tablet  -- 1 tab(s) by mouth once a day  -- Indication: For Adult Hypothyroidism

## 2017-02-28 NOTE — DISCHARGE NOTE ADULT - MEDICATION SUMMARY - MEDICATIONS TO STOP TAKING
I will STOP taking the medications listed below when I get home from the hospital:    Levaquin 500 mg oral tablet  -- 1 tab(s) by mouth every 24 hours  -- Avoid prolonged or excessive exposure to direct and/or artificial sunlight while taking this medication.  Do not take dairy products, antacids, or iron preparations within one hour of this medication.  Finish all this medication unless otherwise directed by prescriber.  May cause drowsiness or dizziness.  Medication should be taken with plenty of water.

## 2017-02-28 NOTE — DISCHARGE NOTE ADULT - PLAN OF CARE
management Please continue antibiotics as directed, until completed.  You had a chest tube while in the hospital for drainage. Please follow up with your doctor for further management.  Call your doctor if you experience increasing shortness of breath or chest pain. management and control Continue medications as directed. Please continue follow up with hematologist.

## 2017-03-23 LAB — FUNGUS SPEC QL CULT: SIGNIFICANT CHANGE UP

## 2017-04-05 LAB — ACID FAST STN FLD: SIGNIFICANT CHANGE UP

## 2017-04-22 ENCOUNTER — INPATIENT (INPATIENT)
Facility: HOSPITAL | Age: 82
LOS: 6 days | Discharge: HOME | End: 2017-04-29
Attending: HOSPITALIST | Admitting: HOSPITALIST
Payer: MEDICARE

## 2017-04-22 VITALS
OXYGEN SATURATION: 97 % | DIASTOLIC BLOOD PRESSURE: 44 MMHG | SYSTOLIC BLOOD PRESSURE: 127 MMHG | HEART RATE: 90 BPM | TEMPERATURE: 98 F | RESPIRATION RATE: 20 BRPM

## 2017-04-22 LAB
ALBUMIN SERPL ELPH-MCNC: 4.7 G/DL — SIGNIFICANT CHANGE UP (ref 3.3–5)
ALP SERPL-CCNC: 121 U/L — HIGH (ref 40–120)
ALT FLD-CCNC: 71 U/L — HIGH (ref 4–41)
ANISOCYTOSIS BLD QL: SIGNIFICANT CHANGE UP
APTT BLD: 29.8 SEC — SIGNIFICANT CHANGE UP (ref 27.5–37.4)
AST SERPL-CCNC: 117 U/L — HIGH (ref 4–40)
BASE EXCESS BLDV CALC-SCNC: -10.2 MMOL/L — SIGNIFICANT CHANGE UP
BASOPHILS NFR SPEC: 0 % — SIGNIFICANT CHANGE UP (ref 0–2)
BILIRUB DIRECT SERPL-MCNC: 2.8 MG/DL — HIGH (ref 0.1–0.2)
BILIRUB SERPL-MCNC: 10.7 MG/DL — HIGH (ref 0.2–1.2)
BLASTS # FLD: 0 % — SIGNIFICANT CHANGE UP (ref 0–0)
BLOOD GAS VENOUS - CREATININE: 1.47 MG/DL — HIGH (ref 0.5–1.3)
BUN SERPL-MCNC: 51 MG/DL — HIGH (ref 7–23)
CALCIUM SERPL-MCNC: 7.5 MG/DL — LOW (ref 8.4–10.5)
CHLORIDE BLDV-SCNC: 108 MMOL/L — SIGNIFICANT CHANGE UP (ref 96–108)
CHLORIDE SERPL-SCNC: 95 MMOL/L — LOW (ref 98–107)
CK MB BLD-MCNC: 2.83 NG/ML — SIGNIFICANT CHANGE UP (ref 1–6.6)
CK MB BLD-MCNC: SIGNIFICANT CHANGE UP (ref 0–2.5)
CK SERPL-CCNC: 79 U/L — SIGNIFICANT CHANGE UP (ref 30–200)
CO2 SERPL-SCNC: 14 MMOL/L — LOW (ref 22–31)
CREAT SERPL-MCNC: 1.47 MG/DL — HIGH (ref 0.5–1.3)
EOSINOPHIL NFR FLD: 0 % — SIGNIFICANT CHANGE UP (ref 0–6)
GAS PNL BLDV: 137 MMOL/L — SIGNIFICANT CHANGE UP (ref 136–146)
GLUCOSE BLDV-MCNC: 130 — HIGH (ref 70–99)
GLUCOSE SERPL-MCNC: 133 MG/DL — HIGH (ref 70–99)
HAPTOGLOB SERPL-MCNC: < 20 MG/DL — LOW (ref 34–200)
HCO3 BLDV-SCNC: 16 MMOL/L — LOW (ref 20–27)
HCT VFR BLD CALC: 18.3 % — CRITICAL LOW (ref 39–50)
HCT VFR BLDV CALC: 15 % — CRITICAL LOW (ref 39–51)
HGB BLD-MCNC: 4.9 G/DL — CRITICAL LOW (ref 13–17)
HGB BLDV-MCNC: 4.7 G/DL — CRITICAL LOW (ref 13–17)
LACTATE BLDV-MCNC: 6.5 MMOL/L — CRITICAL HIGH (ref 0.5–2)
LDH SERPL L TO P-CCNC: 539 U/L — HIGH (ref 135–225)
LYMPHOCYTES NFR SPEC AUTO: 94.3 % — HIGH (ref 13–44)
MACROCYTES BLD QL: SIGNIFICANT CHANGE UP
MCHC RBC-ENTMCNC: 26.8 % — LOW (ref 32–36)
MCHC RBC-ENTMCNC: 36.6 PG — HIGH (ref 27–34)
MCV RBC AUTO: 136.6 FL — HIGH (ref 80–100)
METAMYELOCYTES # FLD: 0 % — SIGNIFICANT CHANGE UP (ref 0–1)
MONOCYTES NFR BLD: 2.4 % — SIGNIFICANT CHANGE UP (ref 2–9)
MYELOCYTES NFR BLD: 0 % — SIGNIFICANT CHANGE UP (ref 0–0)
NEUTROPHIL AB SER-ACNC: 3.3 % — LOW (ref 43–77)
NEUTS BAND # BLD: 0 % — SIGNIFICANT CHANGE UP (ref 0–6)
OTHER - HEMATOLOGY %: 0 — SIGNIFICANT CHANGE UP
PCO2 BLDV: 37 MMHG — LOW (ref 41–51)
PH BLDV: 7.25 PH — LOW (ref 7.32–7.43)
PLATELET # BLD AUTO: 152 K/UL — SIGNIFICANT CHANGE UP (ref 150–400)
PLATELET COUNT - ESTIMATE: NORMAL — SIGNIFICANT CHANGE UP
PMV BLD: 9.2 FL — SIGNIFICANT CHANGE UP (ref 7–13)
PO2 BLDV: 31 MMHG — LOW (ref 35–40)
POIKILOCYTOSIS BLD QL AUTO: SLIGHT — SIGNIFICANT CHANGE UP
POLYCHROMASIA BLD QL SMEAR: SIGNIFICANT CHANGE UP
POTASSIUM BLDV-SCNC: 6.1 MMOL/L — HIGH (ref 3.4–4.5)
POTASSIUM SERPL-MCNC: 9.8 MMOL/L — CRITICAL HIGH (ref 3.5–5.3)
POTASSIUM SERPL-SCNC: 9.8 MMOL/L — CRITICAL HIGH (ref 3.5–5.3)
PROMYELOCYTES # FLD: 0 % — SIGNIFICANT CHANGE UP (ref 0–0)
PROT SERPL-MCNC: 6.1 G/DL — SIGNIFICANT CHANGE UP (ref 6–8.3)
RBC # BLD: 1.34 M/UL — LOW (ref 4.2–5.8)
RBC # FLD: 29.7 % — HIGH (ref 10.3–14.5)
SAO2 % BLDV: 33.4 % — LOW (ref 60–85)
SODIUM SERPL-SCNC: 133 MMOL/L — LOW (ref 135–145)
TROPONIN T SERPL-MCNC: < 0.06 NG/ML — SIGNIFICANT CHANGE UP (ref 0–0.06)
VARIANT LYMPHS # BLD: 0 % — SIGNIFICANT CHANGE UP

## 2017-04-22 PROCEDURE — 71010: CPT | Mod: 26

## 2017-04-22 RX ORDER — ALBUTEROL 90 UG/1
10 AEROSOL, METERED ORAL ONCE
Qty: 0 | Refills: 0 | Status: COMPLETED | OUTPATIENT
Start: 2017-04-22 | End: 2017-04-22

## 2017-04-22 RX ORDER — ALBUTEROL 90 UG/1
10 AEROSOL, METERED ORAL ONCE
Qty: 0 | Refills: 0 | Status: DISCONTINUED | OUTPATIENT
Start: 2017-04-22 | End: 2017-04-22

## 2017-04-22 RX ORDER — SODIUM CHLORIDE 9 MG/ML
500 INJECTION INTRAMUSCULAR; INTRAVENOUS; SUBCUTANEOUS ONCE
Qty: 0 | Refills: 0 | Status: COMPLETED | OUTPATIENT
Start: 2017-04-22 | End: 2017-04-22

## 2017-04-22 RX ORDER — CALCIUM GLUCONATE 100 MG/ML
1 VIAL (ML) INTRAVENOUS ONCE
Qty: 0 | Refills: 0 | Status: COMPLETED | OUTPATIENT
Start: 2017-04-22 | End: 2017-04-22

## 2017-04-22 RX ADMIN — Medication 200 GRAM(S): at 22:44

## 2017-04-22 RX ADMIN — SODIUM CHLORIDE 500 MILLILITER(S): 9 INJECTION INTRAMUSCULAR; INTRAVENOUS; SUBCUTANEOUS at 21:49

## 2017-04-22 RX ADMIN — ALBUTEROL 10 MILLIGRAM(S): 90 AEROSOL, METERED ORAL at 22:44

## 2017-04-22 NOTE — ED PROVIDER NOTE - ABDOMINAL EXAM
soft/soft nondistended mild ruq tenderness neg guard neg fluid wave/tender.../nondistended/no organomegaly

## 2017-04-22 NOTE — ED PROVIDER NOTE - OBJECTIVE STATEMENT
86M h/o CLL, 2/22-2/28/17 admission for rt pleural effusion s/p pigtail p/w sob, anorexia, jaundice. Past three days cough productive of phlegm, constant sob, neg wheeze, afebrile, pos anorexia, worsening jaundice. Neg abd pain. Per wife time of discharge 110 lb now 135 lbs. Rufina att: 86M h/o CLL, 2/22-2/28/17 admission for rt pleural effusion s/p pigtail p/w sob, anorexia, jaundice. Past three days cough productive of phlegm, constant sob, neg wheeze, afebrile, pos anorexia, worsening jaundice. Neg abd pain. Per wife time of discharge 110 lb now 135 lbs.

## 2017-04-22 NOTE — ED ADULT NURSE NOTE - OBJECTIVE STATEMENT
received pt to [facility] pt comes to ED for SOB x 3 days, leg swelling, upper extremity swelling and jaundice. pt is sating well on RA. pt appears yellow skin intact otherwise. pt VSS pt is a febrile. pt HGB was found to be 4.9. 20 g placed in L & R AC labs were drawn and sent EDMD at bedside for eval EKG performed and chest xray. pt is NSR on monitor. awaiting further orders Will continue to monitor the pt

## 2017-04-22 NOTE — ED PROVIDER NOTE - PROGRESS NOTE DETAILS
Qing: Initial team discussed w/ onc. No plasmapheresis for now, recommending IVF, allopurinol and steroid. Received s/o. Awaiting CT results. Given hyperkalemia and elevated lactate will repeat bmp/vbg comp. Klepfish: Repeat lactate improved. K improved (still slightly hemolyzed). Pt well appearing. Admitting to med on tele (for hyperkalemia and also getting PRBC that arent completely compatible). text paged mar.

## 2017-04-22 NOTE — ED ADULT NURSE NOTE - CHIEF COMPLAINT QUOTE
Pt c/o decreased PO intake x 3d. States increasing weakness & WALTERS. States similar symptoms last year  when "he had water in his lungs."  Pt appears jaundice - wife states has been for 1 wk & worsening.

## 2017-04-22 NOTE — ED PROVIDER NOTE - ATTENDING CONTRIBUTION TO CARE
Dr. Almaraz: I have personally seen and examined this patient at the bedside. I have fully participated in the care of this patient. I have reviewed all pertinent clinical information, including history, physical exam, plan and the Resident's note and agree except as noted. HPI above as by me. PE above as by me. CLL patient p/w jaundice, sob, anorexia DDX biliary obstruction versus hemolytic anemia, +/- hcap PLAN pan culture cbc ldh haptoglobin cmp dbili cxr fluids, abx if cxr pos infiltrate, admit to medicine.

## 2017-04-22 NOTE — ED PROVIDER NOTE - CARE PLAN
Principal Discharge DX:	Hemolytic anemia  Secondary Diagnosis:	Transaminitis  Secondary Diagnosis:	Hyperkalemia

## 2017-04-23 DIAGNOSIS — E87.5 HYPERKALEMIA: ICD-10-CM

## 2017-04-23 DIAGNOSIS — E03.9 HYPOTHYROIDISM, UNSPECIFIED: ICD-10-CM

## 2017-04-23 DIAGNOSIS — R06.02 SHORTNESS OF BREATH: ICD-10-CM

## 2017-04-23 DIAGNOSIS — I10 ESSENTIAL (PRIMARY) HYPERTENSION: ICD-10-CM

## 2017-04-23 DIAGNOSIS — D58.9 HEREDITARY HEMOLYTIC ANEMIA, UNSPECIFIED: ICD-10-CM

## 2017-04-23 DIAGNOSIS — D72.829 ELEVATED WHITE BLOOD CELL COUNT, UNSPECIFIED: ICD-10-CM

## 2017-04-23 DIAGNOSIS — Z29.9 ENCOUNTER FOR PROPHYLACTIC MEASURES, UNSPECIFIED: ICD-10-CM

## 2017-04-23 DIAGNOSIS — D59.1 OTHER AUTOIMMUNE HEMOLYTIC ANEMIAS: ICD-10-CM

## 2017-04-23 LAB
ALBUMIN SERPL ELPH-MCNC: 3.8 G/DL — SIGNIFICANT CHANGE UP (ref 3.3–5)
ALBUMIN SERPL ELPH-MCNC: 4.1 G/DL — SIGNIFICANT CHANGE UP (ref 3.3–5)
ALP SERPL-CCNC: 100 U/L — SIGNIFICANT CHANGE UP (ref 40–120)
ALP SERPL-CCNC: 109 U/L — SIGNIFICANT CHANGE UP (ref 40–120)
ALT FLD-CCNC: 57 U/L — HIGH (ref 4–41)
ALT FLD-CCNC: 63 U/L — HIGH (ref 4–41)
APPEARANCE UR: CLEAR — SIGNIFICANT CHANGE UP
AST SERPL-CCNC: 85 U/L — HIGH (ref 4–40)
AST SERPL-CCNC: 86 U/L — HIGH (ref 4–40)
B PERT DNA SPEC QL NAA+PROBE: SIGNIFICANT CHANGE UP
BASE EXCESS BLDV CALC-SCNC: -9.2 MMOL/L — SIGNIFICANT CHANGE UP
BILIRUB SERPL-MCNC: 9.1 MG/DL — HIGH (ref 0.2–1.2)
BILIRUB SERPL-MCNC: 9.1 MG/DL — HIGH (ref 0.2–1.2)
BILIRUB UR-MCNC: NEGATIVE — SIGNIFICANT CHANGE UP
BLOOD GAS VENOUS - CREATININE: 1.43 MG/DL — HIGH (ref 0.5–1.3)
BLOOD UR QL VISUAL: NEGATIVE — SIGNIFICANT CHANGE UP
BUN SERPL-MCNC: 51 MG/DL — HIGH (ref 7–23)
BUN SERPL-MCNC: 51 MG/DL — HIGH (ref 7–23)
C PNEUM DNA SPEC QL NAA+PROBE: NOT DETECTED — SIGNIFICANT CHANGE UP
CALCIUM SERPL-MCNC: 7.7 MG/DL — LOW (ref 8.4–10.5)
CALCIUM SERPL-MCNC: 8 MG/DL — LOW (ref 8.4–10.5)
CHLORIDE BLDV-SCNC: 107 MMOL/L — SIGNIFICANT CHANGE UP (ref 96–108)
CHLORIDE SERPL-SCNC: 101 MMOL/L — SIGNIFICANT CHANGE UP (ref 98–107)
CHLORIDE SERPL-SCNC: 102 MMOL/L — SIGNIFICANT CHANGE UP (ref 98–107)
CK MB BLD-MCNC: 2.57 NG/ML — SIGNIFICANT CHANGE UP (ref 1–6.6)
CK MB BLD-MCNC: SIGNIFICANT CHANGE UP (ref 0–2.5)
CK SERPL-CCNC: 53 U/L — SIGNIFICANT CHANGE UP (ref 30–200)
CO2 SERPL-SCNC: 15 MMOL/L — LOW (ref 22–31)
CO2 SERPL-SCNC: 15 MMOL/L — LOW (ref 22–31)
COLOR SPEC: YELLOW — SIGNIFICANT CHANGE UP
CREAT SERPL-MCNC: 1.42 MG/DL — HIGH (ref 0.5–1.3)
CREAT SERPL-MCNC: 1.45 MG/DL — HIGH (ref 0.5–1.3)
FLUAV H1 2009 PAND RNA SPEC QL NAA+PROBE: NOT DETECTED — SIGNIFICANT CHANGE UP
FLUAV H1 RNA SPEC QL NAA+PROBE: NOT DETECTED — SIGNIFICANT CHANGE UP
FLUAV H3 RNA SPEC QL NAA+PROBE: NOT DETECTED — SIGNIFICANT CHANGE UP
FLUAV SUBTYP SPEC NAA+PROBE: SIGNIFICANT CHANGE UP
FLUBV RNA SPEC QL NAA+PROBE: NOT DETECTED — SIGNIFICANT CHANGE UP
GAS PNL BLDV: 137 MMOL/L — SIGNIFICANT CHANGE UP (ref 136–146)
GLUCOSE BLDV-MCNC: 134 — HIGH (ref 70–99)
GLUCOSE SERPL-MCNC: 149 MG/DL — HIGH (ref 70–99)
GLUCOSE SERPL-MCNC: 155 MG/DL — HIGH (ref 70–99)
GLUCOSE UR-MCNC: NEGATIVE — SIGNIFICANT CHANGE UP
HADV DNA SPEC QL NAA+PROBE: NOT DETECTED — SIGNIFICANT CHANGE UP
HAPTOGLOB SERPL-MCNC: < 20 MG/DL — LOW (ref 34–200)
HAV IGM SER-ACNC: NONREACTIVE — SIGNIFICANT CHANGE UP
HBV CORE IGM SER-ACNC: NONREACTIVE — SIGNIFICANT CHANGE UP
HBV SURFACE AG SER-ACNC: NONREACTIVE — SIGNIFICANT CHANGE UP
HCO3 BLDV-SCNC: 16 MMOL/L — LOW (ref 20–27)
HCOV 229E RNA SPEC QL NAA+PROBE: NOT DETECTED — SIGNIFICANT CHANGE UP
HCOV HKU1 RNA SPEC QL NAA+PROBE: NOT DETECTED — SIGNIFICANT CHANGE UP
HCOV NL63 RNA SPEC QL NAA+PROBE: NOT DETECTED — SIGNIFICANT CHANGE UP
HCOV OC43 RNA SPEC QL NAA+PROBE: NOT DETECTED — SIGNIFICANT CHANGE UP
HCT VFR BLD CALC: 18.4 % — CRITICAL LOW (ref 39–50)
HCT VFR BLDV CALC: 11.4 % — CRITICAL LOW (ref 39–51)
HCV AB S/CO SERPL IA: 0.02 S/CO — SIGNIFICANT CHANGE UP
HCV AB SERPL-IMP: SIGNIFICANT CHANGE UP
HGB BLD-MCNC: 5 G/DL — CRITICAL LOW (ref 13–17)
HGB BLDV-MCNC: 3.5 G/DL — CRITICAL LOW (ref 13–17)
HMPV RNA SPEC QL NAA+PROBE: NOT DETECTED — SIGNIFICANT CHANGE UP
HPIV1 RNA SPEC QL NAA+PROBE: NOT DETECTED — SIGNIFICANT CHANGE UP
HPIV2 RNA SPEC QL NAA+PROBE: NOT DETECTED — SIGNIFICANT CHANGE UP
HPIV3 RNA SPEC QL NAA+PROBE: NOT DETECTED — SIGNIFICANT CHANGE UP
HPIV4 RNA SPEC QL NAA+PROBE: NOT DETECTED — SIGNIFICANT CHANGE UP
KETONES UR-MCNC: NEGATIVE — SIGNIFICANT CHANGE UP
LACTATE BLDV-MCNC: 4.5 MMOL/L — CRITICAL HIGH (ref 0.5–2)
LACTATE SERPL-SCNC: 3.9 MMOL/L — HIGH (ref 0.5–2)
LDH SERPL L TO P-CCNC: 528 U/L — HIGH (ref 135–225)
LEUKOCYTE ESTERASE UR-ACNC: NEGATIVE — SIGNIFICANT CHANGE UP
M PNEUMO DNA SPEC QL NAA+PROBE: NOT DETECTED — SIGNIFICANT CHANGE UP
MAGNESIUM SERPL-MCNC: 1.9 MG/DL — SIGNIFICANT CHANGE UP (ref 1.6–2.6)
MANUAL SMEAR VERIFICATION: SIGNIFICANT CHANGE UP
MCHC RBC-ENTMCNC: 27.2 % — LOW (ref 32–36)
MCHC RBC-ENTMCNC: 34.2 PG — HIGH (ref 27–34)
MCV RBC AUTO: 126 FL — HIGH (ref 80–100)
MUCOUS THREADS # UR AUTO: SIGNIFICANT CHANGE UP
NITRITE UR-MCNC: NEGATIVE — SIGNIFICANT CHANGE UP
PCO2 BLDV: 33 MMHG — LOW (ref 41–51)
PH BLDV: 7.3 PH — LOW (ref 7.32–7.43)
PH UR: 6 — SIGNIFICANT CHANGE UP (ref 4.6–8)
PHOSPHATE SERPL-MCNC: 5 MG/DL — HIGH (ref 2.5–4.5)
PLATELET # BLD AUTO: 117 K/UL — LOW (ref 150–400)
PMV BLD: 9.1 FL — SIGNIFICANT CHANGE UP (ref 7–13)
PO2 BLDV: 29 MMHG — LOW (ref 35–40)
POTASSIUM BLDV-SCNC: 3.8 MMOL/L — SIGNIFICANT CHANGE UP (ref 3.4–4.5)
POTASSIUM SERPL-MCNC: 4 MMOL/L — SIGNIFICANT CHANGE UP (ref 3.5–5.3)
POTASSIUM SERPL-MCNC: 5.6 MMOL/L — HIGH (ref 3.5–5.3)
POTASSIUM SERPL-SCNC: 4 MMOL/L — SIGNIFICANT CHANGE UP (ref 3.5–5.3)
POTASSIUM SERPL-SCNC: 5.6 MMOL/L — HIGH (ref 3.5–5.3)
PROCALCITONIN SERPL-MCNC: 0.33 NG/ML — HIGH (ref 0–0.05)
PROT SERPL-MCNC: 4.9 G/DL — LOW (ref 6–8.3)
PROT SERPL-MCNC: 5.1 G/DL — LOW (ref 6–8.3)
PROT UR-MCNC: 20 — SIGNIFICANT CHANGE UP
RBC # BLD: 1.46 M/UL — LOW (ref 4.2–5.8)
RBC # FLD: 33.2 % — HIGH (ref 10.3–14.5)
RBC CASTS # UR COMP ASSIST: SIGNIFICANT CHANGE UP (ref 0–?)
RSV RNA SPEC QL NAA+PROBE: NOT DETECTED — SIGNIFICANT CHANGE UP
RV+EV RNA SPEC QL NAA+PROBE: POSITIVE — HIGH
SAO2 % BLDV: 28 % — LOW (ref 60–85)
SODIUM SERPL-SCNC: 137 MMOL/L — SIGNIFICANT CHANGE UP (ref 135–145)
SODIUM SERPL-SCNC: 144 MMOL/L — SIGNIFICANT CHANGE UP (ref 135–145)
SP GR SPEC: 1.03 — HIGH (ref 1–1.03)
TROPONIN T SERPL-MCNC: < 0.06 NG/ML — SIGNIFICANT CHANGE UP (ref 0–0.06)
TSH SERPL-MCNC: 9.1 UIU/ML — HIGH (ref 0.27–4.2)
URATE SERPL-MCNC: 7 MG/DL — SIGNIFICANT CHANGE UP (ref 3.4–8.8)
UROBILINOGEN FLD QL: 4 E.U. — HIGH (ref 0.1–0.2)
WBC # BLD: 343.01 K/UL — CRITICAL HIGH (ref 3.8–10.5)
WBC # FLD AUTO: 343.01 K/UL — CRITICAL HIGH (ref 3.8–10.5)
WBC UR QL: SIGNIFICANT CHANGE UP (ref 0–?)

## 2017-04-23 PROCEDURE — 86077 PHYS BLOOD BANK SERV XMATCH: CPT

## 2017-04-23 PROCEDURE — 74177 CT ABD & PELVIS W/CONTRAST: CPT | Mod: 26

## 2017-04-23 PROCEDURE — 99223 1ST HOSP IP/OBS HIGH 75: CPT | Mod: GC

## 2017-04-23 PROCEDURE — 71275 CT ANGIOGRAPHY CHEST: CPT | Mod: 26

## 2017-04-23 RX ORDER — FINASTERIDE 5 MG/1
5 TABLET, FILM COATED ORAL DAILY
Qty: 0 | Refills: 0 | Status: DISCONTINUED | OUTPATIENT
Start: 2017-04-23 | End: 2017-04-29

## 2017-04-23 RX ORDER — PREDNISOLONE 5 MG
60 TABLET ORAL ONCE
Qty: 0 | Refills: 0 | Status: COMPLETED | OUTPATIENT
Start: 2017-04-23 | End: 2017-04-23

## 2017-04-23 RX ORDER — IPRATROPIUM/ALBUTEROL SULFATE 18-103MCG
3 AEROSOL WITH ADAPTER (GRAM) INHALATION EVERY 6 HOURS
Qty: 0 | Refills: 0 | Status: DISCONTINUED | OUTPATIENT
Start: 2017-04-23 | End: 2017-04-26

## 2017-04-23 RX ORDER — IPRATROPIUM/ALBUTEROL SULFATE 18-103MCG
3 AEROSOL WITH ADAPTER (GRAM) INHALATION EVERY 6 HOURS
Qty: 0 | Refills: 0 | Status: DISCONTINUED | OUTPATIENT
Start: 2017-04-23 | End: 2017-04-23

## 2017-04-23 RX ORDER — SODIUM CHLORIDE 9 MG/ML
1000 INJECTION INTRAMUSCULAR; INTRAVENOUS; SUBCUTANEOUS
Qty: 0 | Refills: 0 | Status: DISCONTINUED | OUTPATIENT
Start: 2017-04-23 | End: 2017-04-23

## 2017-04-23 RX ORDER — VANCOMYCIN HCL 1 G
1000 VIAL (EA) INTRAVENOUS EVERY 12 HOURS
Qty: 0 | Refills: 0 | Status: DISCONTINUED | OUTPATIENT
Start: 2017-04-23 | End: 2017-04-23

## 2017-04-23 RX ORDER — ALLOPURINOL 300 MG
200 TABLET ORAL ONCE
Qty: 0 | Refills: 0 | Status: COMPLETED | OUTPATIENT
Start: 2017-04-23 | End: 2017-04-23

## 2017-04-23 RX ORDER — VANCOMYCIN HCL 1 G
1000 VIAL (EA) INTRAVENOUS EVERY 24 HOURS
Qty: 0 | Refills: 0 | Status: DISCONTINUED | OUTPATIENT
Start: 2017-04-23 | End: 2017-04-24

## 2017-04-23 RX ORDER — FOLIC ACID 0.8 MG
1 TABLET ORAL DAILY
Qty: 0 | Refills: 0 | Status: DISCONTINUED | OUTPATIENT
Start: 2017-04-23 | End: 2017-04-29

## 2017-04-23 RX ORDER — SODIUM CHLORIDE 9 MG/ML
1000 INJECTION INTRAMUSCULAR; INTRAVENOUS; SUBCUTANEOUS ONCE
Qty: 0 | Refills: 0 | Status: COMPLETED | OUTPATIENT
Start: 2017-04-23 | End: 2017-04-23

## 2017-04-23 RX ORDER — LEVOTHYROXINE SODIUM 125 MCG
100 TABLET ORAL DAILY
Qty: 0 | Refills: 0 | Status: DISCONTINUED | OUTPATIENT
Start: 2017-04-23 | End: 2017-04-24

## 2017-04-23 RX ORDER — PIPERACILLIN AND TAZOBACTAM 4; .5 G/20ML; G/20ML
3.38 INJECTION, POWDER, LYOPHILIZED, FOR SOLUTION INTRAVENOUS ONCE
Qty: 0 | Refills: 0 | Status: COMPLETED | OUTPATIENT
Start: 2017-04-23 | End: 2017-04-23

## 2017-04-23 RX ORDER — PIPERACILLIN AND TAZOBACTAM 4; .5 G/20ML; G/20ML
3.38 INJECTION, POWDER, LYOPHILIZED, FOR SOLUTION INTRAVENOUS EVERY 8 HOURS
Qty: 0 | Refills: 0 | Status: DISCONTINUED | OUTPATIENT
Start: 2017-04-23 | End: 2017-04-25

## 2017-04-23 RX ORDER — SODIUM CHLORIDE 9 MG/ML
1000 INJECTION INTRAMUSCULAR; INTRAVENOUS; SUBCUTANEOUS
Qty: 0 | Refills: 0 | Status: COMPLETED | OUTPATIENT
Start: 2017-04-23 | End: 2017-04-24

## 2017-04-23 RX ADMIN — Medication 250 MILLIGRAM(S): at 15:01

## 2017-04-23 RX ADMIN — Medication 3 MILLILITER(S): at 21:47

## 2017-04-23 RX ADMIN — Medication 1 MILLIGRAM(S): at 21:40

## 2017-04-23 RX ADMIN — Medication 200 MILLIGRAM(S): at 00:37

## 2017-04-23 RX ADMIN — FINASTERIDE 5 MILLIGRAM(S): 5 TABLET, FILM COATED ORAL at 12:46

## 2017-04-23 RX ADMIN — PIPERACILLIN AND TAZOBACTAM 200 GRAM(S): 4; .5 INJECTION, POWDER, LYOPHILIZED, FOR SOLUTION INTRAVENOUS at 13:56

## 2017-04-23 RX ADMIN — Medication 100 MICROGRAM(S): at 07:04

## 2017-04-23 RX ADMIN — Medication 3 MILLILITER(S): at 15:51

## 2017-04-23 RX ADMIN — Medication 200 MILLIGRAM(S): at 21:40

## 2017-04-23 RX ADMIN — SODIUM CHLORIDE 1000 MILLILITER(S): 9 INJECTION INTRAMUSCULAR; INTRAVENOUS; SUBCUTANEOUS at 00:37

## 2017-04-23 RX ADMIN — PIPERACILLIN AND TAZOBACTAM 25 GRAM(S): 4; .5 INJECTION, POWDER, LYOPHILIZED, FOR SOLUTION INTRAVENOUS at 21:39

## 2017-04-23 RX ADMIN — SODIUM CHLORIDE 75 MILLILITER(S): 9 INJECTION INTRAMUSCULAR; INTRAVENOUS; SUBCUTANEOUS at 13:56

## 2017-04-23 RX ADMIN — Medication 60 MILLIGRAM(S): at 01:15

## 2017-04-23 NOTE — H&P ADULT. - ASSESSMENT
85 yo M w/ PMH of CLL s/p chemo, autoimmune hemolytic anemia, recurrent right sided pleural effusion s/p drainage, hypothyroidism, HTN, BPH p/w several days of WALTERS found to have acute autoimmune hemolytic anemia.

## 2017-04-23 NOTE — H&P ADULT. - PROBLEM SELECTOR PLAN 2
- Multifactorial given h/o pulmonary disease, acute hemolytic anemia  - Monitor O2 sats, currently satting well on RA  - Duoneb prn  - PE ruled out on CTA chest, no evidence of focal consolidation  - Check RVP given diffuse rhonchi on exam   - Plan as above for hemolytic anemia - Multifactorial given h/o pulmonary disease, acute hemolytic anemia  - Monitor O2 sats, currently saturating well on RA  - Duoneb prn  - PE ruled out on CTA chest, no evidence of focal consolidation  - Check RVP given diffuse rhonchi on exam   - Plan as above for hemolytic anemia  - Supplemental oxygen PRN  - Consider bronchodilator therapy if necessary  - f/u cultures, including RVP (RVP positive for Entero/Rhinovirus)  - Antitussive PRN  - IVF for insensible fluid losses

## 2017-04-23 NOTE — H&P ADULT. - PROBLEM SELECTOR PLAN 3
- Likely reactive in setting of acute hemolytic anemia and possible infection  - Monitor CBC - WBC - 462.89 (no Blast cells)  - Likely reactive in the setting of acute hemolytic anemia and possible infection  - Monitor CBC, cultures  - followed by Hem/Onc

## 2017-04-23 NOTE — H&P ADULT. - LAB RESULTS AND INTERPRETATION
Labs reviewed:  (baseline 60), hgb 4.9 (baseline 7-8), plts 152, K+ 5.6, Cr 1.45 (baseline 1.2), Tbili 9.1, direct bili 2.8, , haptoglobin <20, lactate 4.5 decreased from 6.5, warm agglutinin Ab positive, UA negative

## 2017-04-23 NOTE — H&P ADULT. - PROBLEM SELECTOR PLAN 1
- As evidenced by decreased Hgb, elevated LDH, decreased haptoglobin and increased indirect bilirubin  - Heme-onc notified- Transfuse 2 units of PRBCS, per heme, Hgb too low for exchange transfusion at this time  - s/p allopurinol and prednisolone   - Monitor tumor lysis labs  - Infectious w/u including RVP, blood cxs, UA, CXR given diffuse rhonchi on exam - As evidenced by decreased Hgb, elevated LDH, decreased haptoglobin and increased indirect bilirubin  - Heme-onc notified- Transfuse 2 units of PRBCS, per heme, Hgb too low for exchange transfusion at this time  - s/p allopurinol and prednisolone   - Monitor tumor lysis labs  - Infectious w/u including RVP, blood cxs, UA, CXR given diffuse rhonchi on exam  - f/u repeat hemoglobin level

## 2017-04-23 NOTE — H&P ADULT. - HISTORY OF PRESENT ILLNESS
85 yo M w/ PMH of CLL s/p chemo, autoimmune hemolytic anemia, recurrent right sided pleural effusion s/p drainage, hypothyroidism, HTN, BPH p/w several days of WALTERS.    Patient reports 3-4 days of SOB with any amount of movement. He also c/o cough productive of white sputum for the same period of time. Denies fever, chills, rhinorrhea, lacrimation, CP, abdominal pain, diarrhea, dysuria, rash. Patient previously being treated for CLL and AIHA with Rituximab, cyclophosphamide and dexamethasone with good response. Patient recently admitted to hospital from 1/25/17 - 2/6/17 for right sided pleural effusion growing Pseudomonas s/p course of Abx.     In the ED:  VS: T 98, HR 90, /44, RR 20, 97% on RA  Heme-onc called for acute autoimmune hemolytic anemia, given allopurinol 200 mg x1, prednisolone 60 mg x1, calcium gluconate, nebulizer treatments, ordered for 2 units of PRBCs, s/p 1.5 L NS, blood cxs, UA, urine cx, RVP  CT chest A/P showed no evidence of PE, but redemonstrated RUL bronchiectasis and extensive tree-in-bud opacities and progression of disease given extensive lymphadenopathy

## 2017-04-24 ENCOUNTER — TRANSCRIPTION ENCOUNTER (OUTPATIENT)
Age: 82
End: 2017-04-24

## 2017-04-24 LAB
24R-OH-CALCIDIOL SERPL-MCNC: 9.1 NG/ML — LOW (ref 30–100)
ALBUMIN SERPL ELPH-MCNC: 3.8 G/DL — SIGNIFICANT CHANGE UP (ref 3.3–5)
ALP SERPL-CCNC: 96 U/L — SIGNIFICANT CHANGE UP (ref 40–120)
ALT FLD-CCNC: 50 U/L — HIGH (ref 4–41)
ANISOCYTOSIS BLD QL: SIGNIFICANT CHANGE UP
ANISOCYTOSIS BLD QL: SIGNIFICANT CHANGE UP
AST SERPL-CCNC: 60 U/L — HIGH (ref 4–40)
BASOPHILS NFR SPEC: 0 % — SIGNIFICANT CHANGE UP (ref 0–2)
BASOPHILS NFR SPEC: 0 % — SIGNIFICANT CHANGE UP (ref 0–2)
BILIRUB SERPL-MCNC: 4.9 MG/DL — HIGH (ref 0.2–1.2)
BUN SERPL-MCNC: 36 MG/DL — HIGH (ref 7–23)
BUN SERPL-MCNC: 38 MG/DL — HIGH (ref 7–23)
BUN SERPL-MCNC: 42 MG/DL — HIGH (ref 7–23)
CALCIUM SERPL-MCNC: 7.4 MG/DL — LOW (ref 8.4–10.5)
CALCIUM SERPL-MCNC: 7.5 MG/DL — LOW (ref 8.4–10.5)
CALCIUM SERPL-MCNC: 7.7 MG/DL — LOW (ref 8.4–10.5)
CHLORIDE SERPL-SCNC: 102 MMOL/L — SIGNIFICANT CHANGE UP (ref 98–107)
CHLORIDE SERPL-SCNC: 104 MMOL/L — SIGNIFICANT CHANGE UP (ref 98–107)
CHLORIDE SERPL-SCNC: 112 MMOL/L — HIGH (ref 98–107)
CK MB BLD-MCNC: 2.18 NG/ML — SIGNIFICANT CHANGE UP (ref 1–6.6)
CK MB BLD-MCNC: SIGNIFICANT CHANGE UP (ref 0–2.5)
CK SERPL-CCNC: 42 U/L — SIGNIFICANT CHANGE UP (ref 30–200)
CO2 SERPL-SCNC: 16 MMOL/L — LOW (ref 22–31)
CO2 SERPL-SCNC: 17 MMOL/L — LOW (ref 22–31)
CO2 SERPL-SCNC: 17 MMOL/L — LOW (ref 22–31)
CREAT SERPL-MCNC: 1.3 MG/DL — SIGNIFICANT CHANGE UP (ref 0.5–1.3)
CREAT SERPL-MCNC: 1.32 MG/DL — HIGH (ref 0.5–1.3)
CREAT SERPL-MCNC: 1.32 MG/DL — HIGH (ref 0.5–1.3)
DACRYOCYTES BLD QL SMEAR: SLIGHT — SIGNIFICANT CHANGE UP
DACRYOCYTES BLD QL SMEAR: SLIGHT — SIGNIFICANT CHANGE UP
EOSINOPHIL NFR FLD: 0 % — SIGNIFICANT CHANGE UP (ref 0–6)
EOSINOPHIL NFR FLD: 0 % — SIGNIFICANT CHANGE UP (ref 0–6)
FOLATE SERPL-MCNC: 12.5 NG/ML — SIGNIFICANT CHANGE UP (ref 4.7–20)
GLUCOSE SERPL-MCNC: 114 MG/DL — HIGH (ref 70–99)
GLUCOSE SERPL-MCNC: 147 MG/DL — HIGH (ref 70–99)
GLUCOSE SERPL-MCNC: 77 MG/DL — SIGNIFICANT CHANGE UP (ref 70–99)
HAPTOGLOB SERPL-MCNC: 26 MG/DL — LOW (ref 34–200)
HCT VFR BLD CALC: 19.9 % — CRITICAL LOW (ref 39–50)
HCT VFR BLD CALC: 25.6 % — LOW (ref 39–50)
HCT VFR BLD CALC: 25.6 % — LOW (ref 39–50)
HGB BLD-MCNC: 5.7 G/DL — CRITICAL LOW (ref 13–17)
HGB BLD-MCNC: 6.6 G/DL — CRITICAL LOW (ref 13–17)
HGB BLD-MCNC: 6.6 G/DL — CRITICAL LOW (ref 13–17)
HYPOCHROMIA BLD QL: SLIGHT — SIGNIFICANT CHANGE UP
HYPOCHROMIA BLD QL: SLIGHT — SIGNIFICANT CHANGE UP
LDH SERPL L TO P-CCNC: 490 U/L — HIGH (ref 135–225)
LYMPHOCYTES NFR SPEC AUTO: 97.4 % — HIGH (ref 13–44)
LYMPHOCYTES NFR SPEC AUTO: 97.4 % — HIGH (ref 13–44)
MACROCYTES BLD QL: SIGNIFICANT CHANGE UP
MACROCYTES BLD QL: SIGNIFICANT CHANGE UP
MCHC RBC-ENTMCNC: 25.8 % — LOW (ref 32–36)
MCHC RBC-ENTMCNC: 25.8 % — LOW (ref 32–36)
MCHC RBC-ENTMCNC: 28.6 % — LOW (ref 32–36)
MCHC RBC-ENTMCNC: 32.2 PG — SIGNIFICANT CHANGE UP (ref 27–34)
MCHC RBC-ENTMCNC: 32.2 PG — SIGNIFICANT CHANGE UP (ref 27–34)
MCHC RBC-ENTMCNC: 33.9 PG — SIGNIFICANT CHANGE UP (ref 27–34)
MCV RBC AUTO: 118.5 FL — HIGH (ref 80–100)
MCV RBC AUTO: 124.9 FL — HIGH (ref 80–100)
MCV RBC AUTO: 124.9 FL — HIGH (ref 80–100)
MICROCYTES BLD QL: SLIGHT — SIGNIFICANT CHANGE UP
MICROCYTES BLD QL: SLIGHT — SIGNIFICANT CHANGE UP
MONOCYTES NFR BLD: 0.9 % — LOW (ref 2–9)
MONOCYTES NFR BLD: 0.9 % — LOW (ref 2–9)
NEUTROPHIL AB SER-ACNC: 1.7 % — LOW (ref 43–77)
NEUTROPHIL AB SER-ACNC: 1.7 % — LOW (ref 43–77)
PLATELET # BLD AUTO: 65 K/UL — LOW (ref 150–400)
PLATELET # BLD AUTO: 93 K/UL — LOW (ref 150–400)
PLATELET # BLD AUTO: 93 K/UL — LOW (ref 150–400)
PLATELET COUNT - ESTIMATE: SIGNIFICANT CHANGE UP
PLATELET COUNT - ESTIMATE: SIGNIFICANT CHANGE UP
PMV BLD: 9 FL — SIGNIFICANT CHANGE UP (ref 7–13)
PMV BLD: 9 FL — SIGNIFICANT CHANGE UP (ref 7–13)
PMV BLD: 9.2 FL — SIGNIFICANT CHANGE UP (ref 7–13)
POLYCHROMASIA BLD QL SMEAR: SLIGHT — SIGNIFICANT CHANGE UP
POLYCHROMASIA BLD QL SMEAR: SLIGHT — SIGNIFICANT CHANGE UP
POTASSIUM SERPL-MCNC: 6.5 MMOL/L — CRITICAL HIGH (ref 3.5–5.3)
POTASSIUM SERPL-MCNC: 7.7 MMOL/L — CRITICAL HIGH (ref 3.5–5.3)
POTASSIUM SERPL-MCNC: 8.8 MMOL/L — CRITICAL HIGH (ref 3.5–5.3)
POTASSIUM SERPL-SCNC: 6.5 MMOL/L — CRITICAL HIGH (ref 3.5–5.3)
POTASSIUM SERPL-SCNC: 7.7 MMOL/L — CRITICAL HIGH (ref 3.5–5.3)
POTASSIUM SERPL-SCNC: 8.8 MMOL/L — CRITICAL HIGH (ref 3.5–5.3)
PROT SERPL-MCNC: 4.9 G/DL — LOW (ref 6–8.3)
RBC # BLD: 1.68 M/UL — LOW (ref 4.2–5.8)
RBC # BLD: 2.05 M/UL — LOW (ref 4.2–5.8)
RBC # BLD: 2.05 M/UL — LOW (ref 4.2–5.8)
RBC # FLD: 32.7 % — HIGH (ref 10.3–14.5)
RBC # FLD: 34.8 % — HIGH (ref 10.3–14.5)
RBC # FLD: 34.8 % — HIGH (ref 10.3–14.5)
SODIUM SERPL-SCNC: 138 MMOL/L — SIGNIFICANT CHANGE UP (ref 135–145)
SODIUM SERPL-SCNC: 142 MMOL/L — SIGNIFICANT CHANGE UP (ref 135–145)
SODIUM SERPL-SCNC: 143 MMOL/L — SIGNIFICANT CHANGE UP (ref 135–145)
SPECIMEN SOURCE: SIGNIFICANT CHANGE UP
T3 SERPL-MCNC: 56.5 NG/DL — LOW (ref 80–200)
T4 AB SER-ACNC: 7.86 UG/DL — SIGNIFICANT CHANGE UP (ref 5.1–13)
T4 FREE SERPL-MCNC: 1.37 NG/DL — SIGNIFICANT CHANGE UP (ref 0.9–1.8)
TROPONIN T SERPL-MCNC: < 0.06 NG/ML — SIGNIFICANT CHANGE UP (ref 0–0.06)
URATE SERPL-MCNC: 5.7 MG/DL — SIGNIFICANT CHANGE UP (ref 3.4–8.8)
VIT B12 SERPL-MCNC: 614 PG/ML — SIGNIFICANT CHANGE UP (ref 200–900)
WBC # BLD: 226.92 K/UL — CRITICAL HIGH (ref 3.8–10.5)
WBC # BLD: 343.44 K/UL — CRITICAL HIGH (ref 3.8–10.5)
WBC # BLD: 343.44 K/UL — CRITICAL HIGH (ref 3.8–10.5)
WBC # FLD AUTO: 226.92 K/UL — CRITICAL HIGH (ref 3.8–10.5)
WBC # FLD AUTO: 343.44 K/UL — CRITICAL HIGH (ref 3.8–10.5)
WBC # FLD AUTO: 343.44 K/UL — CRITICAL HIGH (ref 3.8–10.5)

## 2017-04-24 PROCEDURE — 93010 ELECTROCARDIOGRAM REPORT: CPT

## 2017-04-24 PROCEDURE — 99233 SBSQ HOSP IP/OBS HIGH 50: CPT | Mod: GC

## 2017-04-24 PROCEDURE — 99231 SBSQ HOSP IP/OBS SF/LOW 25: CPT | Mod: GC

## 2017-04-24 RX ORDER — PREDNISOLONE 5 MG
60 TABLET ORAL DAILY
Qty: 0 | Refills: 0 | Status: DISCONTINUED | OUTPATIENT
Start: 2017-04-24 | End: 2017-04-24

## 2017-04-24 RX ORDER — DEXTROSE 50 % IN WATER 50 %
50 SYRINGE (ML) INTRAVENOUS ONCE
Qty: 0 | Refills: 0 | Status: COMPLETED | OUTPATIENT
Start: 2017-04-24 | End: 2017-04-24

## 2017-04-24 RX ORDER — ALLOPURINOL 300 MG
300 TABLET ORAL ONCE
Qty: 0 | Refills: 0 | Status: COMPLETED | OUTPATIENT
Start: 2017-04-24 | End: 2017-04-24

## 2017-04-24 RX ORDER — SODIUM POLYSTYRENE SULFONATE 4.1 MEQ/G
30 POWDER, FOR SUSPENSION ORAL ONCE
Qty: 0 | Refills: 0 | Status: COMPLETED | OUTPATIENT
Start: 2017-04-24 | End: 2017-04-24

## 2017-04-24 RX ORDER — ALBUTEROL 90 UG/1
10 AEROSOL, METERED ORAL ONCE
Qty: 0 | Refills: 0 | Status: DISCONTINUED | OUTPATIENT
Start: 2017-04-24 | End: 2017-04-26

## 2017-04-24 RX ORDER — CALCIUM GLUCONATE 100 MG/ML
1 VIAL (ML) INTRAVENOUS ONCE
Qty: 0 | Refills: 0 | Status: COMPLETED | OUTPATIENT
Start: 2017-04-24 | End: 2017-04-24

## 2017-04-24 RX ORDER — INSULIN HUMAN 100 [IU]/ML
10 INJECTION, SOLUTION SUBCUTANEOUS ONCE
Qty: 0 | Refills: 0 | Status: DISCONTINUED | OUTPATIENT
Start: 2017-04-24 | End: 2017-04-24

## 2017-04-24 RX ORDER — LEVOTHYROXINE SODIUM 125 MCG
125 TABLET ORAL DAILY
Qty: 0 | Refills: 0 | Status: DISCONTINUED | OUTPATIENT
Start: 2017-04-24 | End: 2017-04-29

## 2017-04-24 RX ORDER — ALLOPURINOL 300 MG
300 TABLET ORAL DAILY
Qty: 0 | Refills: 0 | Status: DISCONTINUED | OUTPATIENT
Start: 2017-04-25 | End: 2017-04-29

## 2017-04-24 RX ORDER — FUROSEMIDE 40 MG
40 TABLET ORAL ONCE
Qty: 0 | Refills: 0 | Status: COMPLETED | OUTPATIENT
Start: 2017-04-24 | End: 2017-04-24

## 2017-04-24 RX ORDER — SODIUM CHLORIDE 9 MG/ML
1000 INJECTION INTRAMUSCULAR; INTRAVENOUS; SUBCUTANEOUS
Qty: 0 | Refills: 0 | Status: DISCONTINUED | OUTPATIENT
Start: 2017-04-24 | End: 2017-04-25

## 2017-04-24 RX ORDER — INSULIN HUMAN 100 [IU]/ML
10 INJECTION, SOLUTION SUBCUTANEOUS ONCE
Qty: 0 | Refills: 0 | Status: COMPLETED | OUTPATIENT
Start: 2017-04-24 | End: 2017-04-24

## 2017-04-24 RX ADMIN — Medication 3 MILLILITER(S): at 16:17

## 2017-04-24 RX ADMIN — Medication 3 MILLILITER(S): at 03:41

## 2017-04-24 RX ADMIN — Medication 200 GRAM(S): at 11:21

## 2017-04-24 RX ADMIN — Medication 60 MILLIGRAM(S): at 15:20

## 2017-04-24 RX ADMIN — FINASTERIDE 5 MILLIGRAM(S): 5 TABLET, FILM COATED ORAL at 15:20

## 2017-04-24 RX ADMIN — Medication 50 MILLILITER(S): at 11:21

## 2017-04-24 RX ADMIN — PIPERACILLIN AND TAZOBACTAM 25 GRAM(S): 4; .5 INJECTION, POWDER, LYOPHILIZED, FOR SOLUTION INTRAVENOUS at 21:57

## 2017-04-24 RX ADMIN — Medication 1 MILLIGRAM(S): at 15:20

## 2017-04-24 RX ADMIN — SODIUM CHLORIDE 75 MILLILITER(S): 9 INJECTION INTRAMUSCULAR; INTRAVENOUS; SUBCUTANEOUS at 13:07

## 2017-04-24 RX ADMIN — Medication 3 MILLILITER(S): at 22:01

## 2017-04-24 RX ADMIN — SODIUM POLYSTYRENE SULFONATE 30 GRAM(S): 4.1 POWDER, FOR SUSPENSION ORAL at 10:50

## 2017-04-24 RX ADMIN — Medication 40 MILLIGRAM(S): at 12:46

## 2017-04-24 RX ADMIN — Medication 100 MICROGRAM(S): at 06:05

## 2017-04-24 RX ADMIN — INSULIN HUMAN 10 UNIT(S): 100 INJECTION, SOLUTION SUBCUTANEOUS at 11:22

## 2017-04-24 RX ADMIN — PIPERACILLIN AND TAZOBACTAM 25 GRAM(S): 4; .5 INJECTION, POWDER, LYOPHILIZED, FOR SOLUTION INTRAVENOUS at 15:19

## 2017-04-24 RX ADMIN — Medication 300 MILLIGRAM(S): at 17:39

## 2017-04-24 RX ADMIN — Medication 3 MILLILITER(S): at 10:12

## 2017-04-24 RX ADMIN — PIPERACILLIN AND TAZOBACTAM 25 GRAM(S): 4; .5 INJECTION, POWDER, LYOPHILIZED, FOR SOLUTION INTRAVENOUS at 06:04

## 2017-04-24 NOTE — DISCHARGE NOTE ADULT - SECONDARY DIAGNOSIS.
CLL (chronic lymphocytic leukemia) Tumor lysis syndrome Benign prostatic hypertrophy Hypertension Hyperlipidemia Hypothyroidism Aortic aneurysm

## 2017-04-24 NOTE — DISCHARGE NOTE ADULT - MEDICATION SUMMARY - MEDICATIONS TO STOP TAKING
I will STOP taking the medications listed below when I get home from the hospital:    Augmentin 875 mg-125 mg oral tablet  -- 875 milligram(s) by mouth every 12 hours  -- Finish all this medication unless otherwise directed by prescriber.  Take with food or milk.

## 2017-04-24 NOTE — DISCHARGE NOTE ADULT - PATIENT PORTAL LINK FT
“You can access the Follow[facility] Patient Portal, offered by [facility], by registering with the following website: [email or web address]”

## 2017-04-24 NOTE — DISCHARGE NOTE ADULT - PLAN OF CARE
Avoid future episodes You have been hospitalized due to hemolysis. Monitor for signs and symptoms Continue current regimen Follow up care Please continue to take allopurinol for tumor lysis syndrome prevention. Follow up with Dr. Mathew within a week. You may continue to take Flomax at your home dosage. Continue to take your home blood pressure medications, and measure your blood pressure daily. If it is consistently high (>180/90), or you feel headaches, dizziness, changes in vision, or extreme malaise, please seek medical attention immediately. Please continue to take your atorvastatin for your cholesterol as prescribed. You underwent chemotherapy while in the hospital. Please follow up with Dr. Mathew within a week to discuss future treatment. You have been hospitalized due to hemolysis, likely secondary to your respiratory enterovirus infection, and were transfused. If you notice yellowing of skin, extreme weakness, or changes in your urine color, please seek medical attention. Follow up with Dr. Mathew within a week.

## 2017-04-24 NOTE — DISCHARGE NOTE ADULT - CARE PROVIDER_API CALL
Lux Mathew), Hematology; Internal Medicine; Medical Oncology  [address] [place] [address]  Phone: [phone]  Fax: [phone]

## 2017-04-24 NOTE — DISCHARGE NOTE ADULT - CARE PLAN
Principal Discharge DX:	Hemolytic anemia  Goal:	Avoid future episodes  Instructions for follow-up, activity and diet:	You have been hospitalized due to hemolysis.  Secondary Diagnosis:	CLL (chronic lymphocytic leukemia)  Secondary Diagnosis:	Tumor lysis syndrome  Secondary Diagnosis:	Benign prostatic hypertrophy  Secondary Diagnosis:	Hypertension  Secondary Diagnosis:	Hyperlipidemia  Secondary Diagnosis:	Hypothyroidism Principal Discharge DX:	Hemolytic anemia  Goal:	Avoid future episodes  Instructions for follow-up, activity and diet:	You have been hospitalized due to hemolysis.  Secondary Diagnosis:	CLL (chronic lymphocytic leukemia)  Goal:	Follow up care  Secondary Diagnosis:	Tumor lysis syndrome  Goal:	Continue current regimen  Secondary Diagnosis:	Benign prostatic hypertrophy  Goal:	Continue current regimen  Secondary Diagnosis:	Hypertension  Goal:	Continue current regimen  Secondary Diagnosis:	Hyperlipidemia  Goal:	Continue current regimen  Secondary Diagnosis:	Hypothyroidism  Goal:	Monitor for signs and symptoms Principal Discharge DX:	Hemolytic anemia  Goal:	Avoid future episodes  Instructions for follow-up, activity and diet:	You have been hospitalized due to hemolysis.  Secondary Diagnosis:	CLL (chronic lymphocytic leukemia)  Goal:	Follow up care  Secondary Diagnosis:	Tumor lysis syndrome  Goal:	Continue current regimen  Secondary Diagnosis:	Benign prostatic hypertrophy  Goal:	Continue current regimen  Secondary Diagnosis:	Hypertension  Goal:	Continue current regimen  Secondary Diagnosis:	Hyperlipidemia  Goal:	Continue current regimen  Secondary Diagnosis:	Aortic aneurysm  Goal:	Monitor for signs and symptoms Principal Discharge DX:	Hemolytic anemia  Goal:	Avoid future episodes  Instructions for follow-up, activity and diet:	You have been hospitalized due to hemolysis, likely secondary to your respiratory enterovirus infection, and were transfused. If you notice yellowing of skin, extreme weakness, or changes in your urine color, please seek medical attention. Follow up with Dr. Mathew within a week.  Secondary Diagnosis:	CLL (chronic lymphocytic leukemia)  Goal:	Follow up care  Instructions for follow-up, activity and diet:	You underwent chemotherapy while in the hospital. Please follow up with Dr. Mathew within a week to discuss future treatment.  Secondary Diagnosis:	Tumor lysis syndrome  Goal:	Continue current regimen  Instructions for follow-up, activity and diet:	Please continue to take allopurinol for tumor lysis syndrome prevention. Follow up with Dr. Mathew within a week.  Secondary Diagnosis:	Benign prostatic hypertrophy  Goal:	Continue current regimen  Instructions for follow-up, activity and diet:	You may continue to take Flomax at your home dosage.  Secondary Diagnosis:	Hypertension  Goal:	Continue current regimen  Instructions for follow-up, activity and diet:	Continue to take your home blood pressure medications, and measure your blood pressure daily. If it is consistently high (>180/90), or you feel headaches, dizziness, changes in vision, or extreme malaise, please seek medical attention immediately.  Secondary Diagnosis:	Hyperlipidemia  Goal:	Continue current regimen  Instructions for follow-up, activity and diet:	Please continue to take your atorvastatin for your cholesterol as prescribed.

## 2017-04-24 NOTE — DISCHARGE NOTE ADULT - MEDICATION SUMMARY - MEDICATIONS TO CHANGE
I will SWITCH the dose or number of times a day I take the medications listed below when I get home from the hospital:    Synthroid 100 mcg (0.1 mg) oral tablet  -- 1 tab(s) by mouth once a day

## 2017-04-24 NOTE — DISCHARGE NOTE ADULT - MEDICATION SUMMARY - MEDICATIONS TO TAKE
I will START or STAY ON the medications listed below when I get home from the hospital:    Avodart 0.5 mg oral capsule  -- 1 cap(s) by mouth once a day  -- Indication: For Benign prostatic hypertrophy    allopurinol 300 mg oral tablet  -- 1 tab(s) by mouth once a day  -- Indication: For Tumor lysis syndrome    Lipitor 20 mg oral tablet  -- 1 tab(s) by mouth once a day (at bedtime)  -- Indication: For Hyperlipidemia    Stiolto Respimat 2.5 mcg-2.5 mcg inhalation aerosol  -- 2 puff(s) inhaled every 24 hours  -- Indication: For SOB (shortness of breath)    Norvasc 10 mg oral tablet  -- 1 tab(s) by mouth once a day  -- Indication: For Hypertension I will START or STAY ON the medications listed below when I get home from the hospital:    Avodart 0.5 mg oral capsule  -- 1 cap(s) by mouth once a day  -- Indication: For Benign prostatic hypertrophy    allopurinol 300 mg oral tablet  -- 1 tab(s) by mouth once a day  -- Indication: For Tumor lysis syndrome    Lipitor 20 mg oral tablet  -- 1 tab(s) by mouth once a day (at bedtime)  -- Indication: For Hyperlipidemia    Stiolto Respimat 2.5 mcg-2.5 mcg inhalation aerosol  -- 2 puff(s) inhaled every 24 hours  -- Indication: For SOB (shortness of breath)    Norvasc 10 mg oral tablet  -- 1 tab(s) by mouth once a day  -- Indication: For Hypertension    guaiFENesin 100 mg/5 mL oral liquid  -- 10 milliliter(s) by mouth every 6 hours, As needed, Cough  -- Indication: For Cough    Synthroid 100 mcg (0.1 mg) oral tablet  -- 1 tab(s) by mouth once a day  -- Indication: For Hypothyroidism    folic acid 1 mg oral tablet  -- 1 tab(s) by mouth once a day  -- Indication: For vitamins

## 2017-04-24 NOTE — DISCHARGE NOTE ADULT - OTHER SIGNIFICANT FINDINGS
4/23 CTA and CT A/P:  1.  No pulmonary embolism.  2.  Redemonstration of right upper lobe bronchiectasis. Diffuse tree-in-bud opacities, unchanged.  3.  Interval progression of disease with enlarging upper paratracheal,pelvic and retroperitoneal lymphadenopathy with increased encasement of the retroperitoneal vessels.  Splenomegaly.  4.  Cholelithiasis.  5.  Dilation of the infrarenal abdominal aorta measuring 3.1 cm.    4/22 CXR: 4/23 CTA and CT A/P:  1.  No pulmonary embolism.  2.  Redemonstration of right upper lobe bronchiectasis. Diffuse tree-in-bud opacities, unchanged.  3.  Interval progression of disease with enlarging upper paratracheal, pelvic and retroperitoneal lymphadenopathy with increased encasement of the retroperitoneal vessels.  Splenomegaly.  4.  Cholelithiasis.  5.  Dilation of the infrarenal abdominal aorta measuring 3.1 cm.    4/22 CXR:  COMPARISON: Chest radiograph 2/28/2017.FINDINGS: Heart size normal in size. There is atherosclerotic calcification of the thoracic aorta. There are again small patchy right lung opacities which are unchanged from prior imaging. There is a trace right pleural effusion .IMPRESSION: Unchanged right lung patchy opacities. Correlation with the CT performed on the same day is recommended.

## 2017-04-24 NOTE — DISCHARGE NOTE ADULT - HOSPITAL COURSE
87 yo M w/ PMH of CLL s/p chemo, autoimmune hemolytic anemia, recurrent right sided pleural effusion s/p drainage, hypothyroidism, HTN, BPH p/w several days of WALTERS found to have acute autoimmune hemolytic anemia, tumor lysis syndrome, and KARRI. 85 yo M w/ PMH of CLL s/p chemo, autoimmune hemolytic anemia, recurrent right sided pleural effusion s/p drainage, hypothyroidism, HTN, BPH p/w several days of WALTERS found to have acute autoimmune hemolytic anemia, tumor lysis syndrome, and KARRI. RVP was performed and positive for enterovirus. CXR found tree-in-bud opacification. Pt was initially placed on vancomycin and zosyn for possible superimposed pneumonia.     Pt began chemotherapy (rituximab, cyclophosphamide, dexamethasone) with the oncology team on 4/25. Tumor lysis labs (LDH, haptoglobin, uric acid levels) continued to improve.     Consults:  heme/onc 87 yo M w/ PMH of CLL s/p chemo, autoimmune hemolytic anemia, recurrent right sided pleural effusion s/p drainage, hypothyroidism, HTN, BPH p/w several days of WALTERS found to have acute autoimmune hemolytic anemia, tumor lysis syndrome, and KARRI. RVP was performed and positive for enterovirus. CXR and CTA were performed, and findings were notable for tree-in-bud opacities. UCX were positive for ___ but at subclinically significant values (50,000-90,000 CFU). Pt was initially placed on vancomycin and zosyn for possible superimposed pneumonia, which was DC'd after BCX came back negative.    Pt began chemotherapy (rituximab, cyclophosphamide, dexamethasone) with the oncology team on 4/25. Tumor lysis labs (LDH, haptoglobin, uric acid levels) continued to improve.     Consults:  heme/onc 87 yo M w/ PMH of CLL s/p chemo, autoimmune hemolytic anemia, recurrent right sided pleural effusion s/p drainage, hypothyroidism, HTN, BPH p/w several days of WALTERS found to have acute autoimmune hemolytic anemia, tumor lysis syndrome, and KARRI. RVP was performed and positive for enterovirus. CXR and CTA were performed, and findings were notable for tree-in-bud opacities. Pt labs were notable initially for Hgb/Hct of 4.9/183, WBC 4.9 with 0 bands. Additionally, he was hyponatremic to 133, with a K+ of 9.8, elevated LFTs, and elevated tumor lysis labs (, haptoglobin <20). He was also acidotic on VBG to 7.25. Hepatitis labs were negative. Direct Shania revealed positive warm agglutinins.  UCX were positive for ___ but at subclinically significant values (50,000-90,000 CFU). Pt was initially placed on vancomycin and zosyn for possible superimposed pneumonia, which was DC'd after BCX came back negative.    Pt began chemotherapy (rituximab, cyclophosphamide, dexamethasone) with the oncology team on 4/25. Tumor lysis labs (LDH, haptoglobin, uric acid levels) continued to improve.     Consults:  heme/onc 85 yo M w/ PMH of CLL s/p chemo, autoimmune hemolytic anemia, recurrent right sided pleural effusion s/p drainage, hypothyroidism, HTN, BPH p/w several days of WALTERS found to have acute autoimmune hemolytic anemia, tumor lysis syndrome, and KARRI. RVP was performed and positive for enterovirus. CXR and CTA were performed, and findings were notable for tree-in-bud opacities. Pt labs were notable initially for Hgb/Hct of 4.9/183, WBC 4.9 with 0 bands. Additionally, he was hyponatremic to 133, with a K+ of 9.8, elevated LFTs, and elevated tumor lysis labs (, haptoglobin <20). He was also acidotic on VBG to 7.25. Hepatitis labs were negative. Direct Shania revealed positive warm agglutinins.  UCX had subclinically significant values (50,000-90,000 CFU). Pt was initially placed on vancomycin and zosyn for possible superimposed pneumonia, which was DC'd after BCX came back negative.    Pt began chemotherapy (rituximab, cyclophosphamide, dexamethasone) with the oncology team on 4/25. Tumor lysis labs (LDH, haptoglobin, uric acid levels) continued to improve. He was transfused another 3UPRBC for anemia, likely 2/2 chemotherapy vs. dilutional anemia from IVF. Pt was discharged on ___  to home with home PT in stable condition with instructions to follow up with his hematologist within a week.    Consults:  heme/onc  PT 87 yo M w/ PMH of CLL s/p chemo, autoimmune hemolytic anemia, recurrent right sided pleural effusion s/p drainage, hypothyroidism, HTN, BPH p/w several days of WALTERS found to have acute autoimmune hemolytic anemia, tumor lysis syndrome, and KARRI. RVP was performed and positive for enterovirus. CXR and CTA were performed, and findings were notable for tree-in-bud opacities. Pt labs were notable initially for Hgb/Hct of 4.9/183, WBC 4.9 with 0 bands. Additionally, he was hyponatremic to 133, with a K+ of 9.8, elevated LFTs, and elevated tumor lysis labs (, haptoglobin <20). He was also acidotic on VBG to 7.25. Hepatitis labs were negative. Direct Shania revealed positive warm agglutinins.  UCX had subclinically significant values (50,000-90,000 CFU). Pt was initially placed on vancomycin and zosyn for possible superimposed pneumonia, which was DC'd after BCX came back negative.    Pt began chemotherapy (rituximab, cyclophosphamide, dexamethasone) with the oncology team on 4/25. Tumor lysis labs (LDH, haptoglobin, uric acid levels) continued to improve. He was transfused another 3UPRBC for anemia, likely 2/2 chemotherapy vs. dilutional anemia from IVF. Pt was discharged on 4/29/17 to home with home PT in stable condition with instructions to follow up with his hematologist within a week.    Consults:  heme/onc  PT

## 2017-04-24 NOTE — DISCHARGE NOTE ADULT - CARE PROVIDERS DIRECT ADDRESSES
[email or web address]

## 2017-04-25 LAB
ALBUMIN SERPL ELPH-MCNC: 3.8 G/DL — SIGNIFICANT CHANGE UP (ref 3.3–5)
ALP SERPL-CCNC: 92 U/L — SIGNIFICANT CHANGE UP (ref 40–120)
ALT FLD-CCNC: 41 U/L — SIGNIFICANT CHANGE UP (ref 4–41)
AST SERPL-CCNC: 40 U/L — SIGNIFICANT CHANGE UP (ref 4–40)
BILIRUB SERPL-MCNC: 3.3 MG/DL — HIGH (ref 0.2–1.2)
BUN SERPL-MCNC: 30 MG/DL — HIGH (ref 7–23)
CALCIUM SERPL-MCNC: 7.3 MG/DL — LOW (ref 8.4–10.5)
CHLORIDE SERPL-SCNC: 108 MMOL/L — HIGH (ref 98–107)
CO2 SERPL-SCNC: 16 MMOL/L — LOW (ref 22–31)
CREAT SERPL-MCNC: 1.35 MG/DL — HIGH (ref 0.5–1.3)
GLUCOSE SERPL-MCNC: 144 MG/DL — HIGH (ref 70–99)
HAPTOGLOB SERPL-MCNC: 57 MG/DL — SIGNIFICANT CHANGE UP (ref 34–200)
HCT VFR BLD CALC: 24.3 % — LOW (ref 39–50)
HGB BLD-MCNC: 5.9 G/DL — CRITICAL LOW (ref 13–17)
LDH SERPL L TO P-CCNC: 466 U/L — HIGH (ref 135–225)
MAGNESIUM SERPL-MCNC: 1.7 MG/DL — SIGNIFICANT CHANGE UP (ref 1.6–2.6)
MCHC RBC-ENTMCNC: 24.3 % — LOW (ref 32–36)
MCHC RBC-ENTMCNC: 30.9 PG — SIGNIFICANT CHANGE UP (ref 27–34)
MCV RBC AUTO: 127.2 FL — HIGH (ref 80–100)
PHOSPHATE SERPL-MCNC: 4.6 MG/DL — HIGH (ref 2.5–4.5)
PLATELET # BLD AUTO: 73 K/UL — LOW (ref 150–400)
PMV BLD: 9.1 FL — SIGNIFICANT CHANGE UP (ref 7–13)
POTASSIUM SERPL-MCNC: 8.2 MMOL/L — CRITICAL HIGH (ref 3.5–5.3)
POTASSIUM SERPL-SCNC: 8.2 MMOL/L — CRITICAL HIGH (ref 3.5–5.3)
PROT SERPL-MCNC: 5.3 G/DL — LOW (ref 6–8.3)
RBC # BLD: 1.91 M/UL — LOW (ref 4.2–5.8)
RBC # FLD: 34.8 % — HIGH (ref 10.3–14.5)
SODIUM SERPL-SCNC: 139 MMOL/L — SIGNIFICANT CHANGE UP (ref 135–145)
WBC # BLD: 347.58 K/UL — CRITICAL HIGH (ref 3.8–10.5)
WBC # FLD AUTO: 347.58 K/UL — CRITICAL HIGH (ref 3.8–10.5)

## 2017-04-25 PROCEDURE — 99233 SBSQ HOSP IP/OBS HIGH 50: CPT | Mod: GC

## 2017-04-25 PROCEDURE — 99231 SBSQ HOSP IP/OBS SF/LOW 25: CPT | Mod: GC

## 2017-04-25 RX ORDER — SODIUM CHLORIDE 9 MG/ML
1000 INJECTION INTRAMUSCULAR; INTRAVENOUS; SUBCUTANEOUS
Qty: 0 | Refills: 0 | Status: DISCONTINUED | OUTPATIENT
Start: 2017-04-25 | End: 2017-04-26

## 2017-04-25 RX ORDER — LEVOTHYROXINE SODIUM 125 MCG
1 TABLET ORAL
Qty: 30 | Refills: 0 | OUTPATIENT
Start: 2017-04-25 | End: 2017-05-25

## 2017-04-25 RX ORDER — AMLODIPINE BESYLATE 2.5 MG/1
10 TABLET ORAL DAILY
Qty: 0 | Refills: 0 | Status: DISCONTINUED | OUTPATIENT
Start: 2017-04-25 | End: 2017-04-29

## 2017-04-25 RX ORDER — MEPERIDINE HYDROCHLORIDE 50 MG/ML
25 INJECTION INTRAMUSCULAR; INTRAVENOUS; SUBCUTANEOUS ONCE
Qty: 0 | Refills: 0 | Status: DISCONTINUED | OUTPATIENT
Start: 2017-04-25 | End: 2017-04-29

## 2017-04-25 RX ORDER — ACETAMINOPHEN 500 MG
650 TABLET ORAL ONCE
Qty: 0 | Refills: 0 | Status: DISCONTINUED | OUTPATIENT
Start: 2017-04-25 | End: 2017-04-29

## 2017-04-25 RX ORDER — RITUXIMAB 10 MG/ML
640 INJECTION, SOLUTION INTRAVENOUS ONCE
Qty: 0 | Refills: 0 | Status: DISCONTINUED | OUTPATIENT
Start: 2017-04-25 | End: 2017-04-29

## 2017-04-25 RX ORDER — FOLIC ACID 0.8 MG
1 TABLET ORAL
Qty: 30 | Refills: 0 | OUTPATIENT
Start: 2017-04-25 | End: 2017-05-25

## 2017-04-25 RX ORDER — SODIUM CHLORIDE 9 MG/ML
1000 INJECTION INTRAMUSCULAR; INTRAVENOUS; SUBCUTANEOUS
Qty: 0 | Refills: 0 | Status: DISCONTINUED | OUTPATIENT
Start: 2017-04-25 | End: 2017-04-25

## 2017-04-25 RX ORDER — HYDROCORTISONE 20 MG
50 TABLET ORAL ONCE
Qty: 0 | Refills: 0 | Status: DISCONTINUED | OUTPATIENT
Start: 2017-04-25 | End: 2017-04-29

## 2017-04-25 RX ORDER — LEVOTHYROXINE SODIUM 125 MCG
1 TABLET ORAL
Qty: 0 | Refills: 0 | COMMUNITY

## 2017-04-25 RX ORDER — DIPHENHYDRAMINE HCL 50 MG
50 CAPSULE ORAL ONCE
Qty: 0 | Refills: 0 | Status: DISCONTINUED | OUTPATIENT
Start: 2017-04-25 | End: 2017-04-29

## 2017-04-25 RX ADMIN — SODIUM CHLORIDE 100 MILLILITER(S): 9 INJECTION INTRAMUSCULAR; INTRAVENOUS; SUBCUTANEOUS at 16:28

## 2017-04-25 RX ADMIN — Medication 3 MILLILITER(S): at 21:41

## 2017-04-25 RX ADMIN — AMLODIPINE BESYLATE 10 MILLIGRAM(S): 2.5 TABLET ORAL at 14:26

## 2017-04-25 RX ADMIN — Medication 3 MILLILITER(S): at 16:38

## 2017-04-25 RX ADMIN — Medication 100 MILLIGRAM(S): at 23:15

## 2017-04-25 RX ADMIN — Medication 3 MILLILITER(S): at 03:37

## 2017-04-25 RX ADMIN — Medication 3 MILLILITER(S): at 10:53

## 2017-04-25 RX ADMIN — Medication 125 MICROGRAM(S): at 07:10

## 2017-04-25 RX ADMIN — FINASTERIDE 5 MILLIGRAM(S): 5 TABLET, FILM COATED ORAL at 13:07

## 2017-04-25 RX ADMIN — Medication 1 MILLIGRAM(S): at 13:07

## 2017-04-25 RX ADMIN — Medication 60 MILLIGRAM(S): at 07:10

## 2017-04-25 RX ADMIN — Medication 100 MILLIGRAM(S): at 18:01

## 2017-04-25 RX ADMIN — PIPERACILLIN AND TAZOBACTAM 25 GRAM(S): 4; .5 INJECTION, POWDER, LYOPHILIZED, FOR SOLUTION INTRAVENOUS at 07:10

## 2017-04-25 RX ADMIN — Medication 300 MILLIGRAM(S): at 14:26

## 2017-04-25 NOTE — PHYSICAL THERAPY INITIAL EVALUATION ADULT - PERTINENT HX OF CURRENT PROBLEM, REHAB EVAL
87 yo M w/ PMH of CLL s/p chemo, autoimmune hemolytic anemia, recurrent right sided pleural effusion s/p drainage, hypothyroidism, HTN, BPH p/w several days of WALTERS found to have acute autoimmune hemolytic anemia.

## 2017-04-25 NOTE — PHYSICAL THERAPY INITIAL EVALUATION ADULT - ADDITIONAL COMMENTS
Pt reports that he lives in a private house with wife with ~3 steps to negotiate to enter; bedroom on first floor. Prior to hospital admission pt was completely independent and used a single axis cane (household ambulation)/ rolling walker (community ambulation). Pt requires occasional assistance with ADL's (showering/dressing)

## 2017-04-25 NOTE — PHYSICAL THERAPY INITIAL EVALUATION ADULT - DIAGNOSIS, PT EVAL
Pt admitted for SOB/WALTERS and presents with decreased strength, decreased balance, and decreased aerobic capacity/endurance.

## 2017-04-25 NOTE — PHYSICAL THERAPY INITIAL EVALUATION ADULT - PATIENT PROFILE REVIEW, REHAB EVAL
yes/ACTIVITY: Increase as Tolerated; spoke with IRMA Tavares prior to PT evaluation--> Pt OK for ambulation.

## 2017-04-25 NOTE — PHYSICAL THERAPY INITIAL EVALUATION ADULT - CRITERIA FOR SKILLED THERAPEUTIC INTERVENTIONS
impairments found/rehab potential/functional limitations in following categories/risk reduction/prevention

## 2017-04-26 LAB
-  AMIKACIN: SIGNIFICANT CHANGE UP
-  AMPICILLIN/SULBACTAM: SIGNIFICANT CHANGE UP
-  AMPICILLIN: SIGNIFICANT CHANGE UP
-  AZTREONAM: SIGNIFICANT CHANGE UP
-  CEFAZOLIN: SIGNIFICANT CHANGE UP
-  CEFEPIME: SIGNIFICANT CHANGE UP
-  CEFOXITIN: SIGNIFICANT CHANGE UP
-  CEFTAZIDIME: SIGNIFICANT CHANGE UP
-  CEFTRIAXONE: SIGNIFICANT CHANGE UP
-  CIPROFLOXACIN: SIGNIFICANT CHANGE UP
-  ERTAPENEM: SIGNIFICANT CHANGE UP
-  GENTAMICIN: SIGNIFICANT CHANGE UP
-  IMIPENEM: SIGNIFICANT CHANGE UP
-  LEVOFLOXACIN: SIGNIFICANT CHANGE UP
-  MEROPENEM: SIGNIFICANT CHANGE UP
-  NITROFURANTOIN: SIGNIFICANT CHANGE UP
-  PIPERACILLIN/TAZOBACTAM: SIGNIFICANT CHANGE UP
-  TIGECYCLINE: SIGNIFICANT CHANGE UP
-  TOBRAMYCIN: SIGNIFICANT CHANGE UP
-  TRIMETHOPRIM/SULFAMETHOXAZOLE: SIGNIFICANT CHANGE UP
ALBUMIN SERPL ELPH-MCNC: 3.2 G/DL — LOW (ref 3.3–5)
ALP SERPL-CCNC: 89 U/L — SIGNIFICANT CHANGE UP (ref 40–120)
ALT FLD-CCNC: 28 U/L — SIGNIFICANT CHANGE UP (ref 4–41)
AST SERPL-CCNC: 48 U/L — HIGH (ref 4–40)
BACTERIA UR CULT: SIGNIFICANT CHANGE UP
BILIRUB SERPL-MCNC: 1.1 MG/DL — SIGNIFICANT CHANGE UP (ref 0.2–1.2)
BUN SERPL-MCNC: 27 MG/DL — HIGH (ref 7–23)
CALCIUM SERPL-MCNC: 7 MG/DL — LOW (ref 8.4–10.5)
CHLORIDE SERPL-SCNC: 113 MMOL/L — HIGH (ref 98–107)
CO2 SERPL-SCNC: 13 MMOL/L — LOW (ref 22–31)
CREAT SERPL-MCNC: 1.17 MG/DL — SIGNIFICANT CHANGE UP (ref 0.5–1.3)
GLUCOSE SERPL-MCNC: 115 MG/DL — HIGH (ref 70–99)
HAPTOGLOB SERPL-MCNC: 70 MG/DL — SIGNIFICANT CHANGE UP (ref 34–200)
HCT VFR BLD CALC: 19.8 % — CRITICAL LOW (ref 39–50)
HGB BLD-MCNC: 5.6 G/DL — CRITICAL LOW (ref 13–17)
LDH SERPL L TO P-CCNC: 583 U/L — HIGH (ref 135–225)
MAGNESIUM SERPL-MCNC: 1.7 MG/DL — SIGNIFICANT CHANGE UP (ref 1.6–2.6)
MCHC RBC-ENTMCNC: 28.3 % — LOW (ref 32–36)
MCHC RBC-ENTMCNC: 34.1 PG — HIGH (ref 27–34)
MCV RBC AUTO: 120.7 FL — HIGH (ref 80–100)
METHOD TYPE: SIGNIFICANT CHANGE UP
ORGANISM # SPEC MICROSCOPIC CNT: SIGNIFICANT CHANGE UP
ORGANISM # SPEC MICROSCOPIC CNT: SIGNIFICANT CHANGE UP
PHOSPHATE SERPL-MCNC: 4.6 MG/DL — HIGH (ref 2.5–4.5)
PLATELET # BLD AUTO: 24 K/UL — LOW (ref 150–400)
PMV BLD: 10.7 FL — SIGNIFICANT CHANGE UP (ref 7–13)
POTASSIUM SERPL-MCNC: 4.7 MMOL/L — SIGNIFICANT CHANGE UP (ref 3.5–5.3)
POTASSIUM SERPL-SCNC: 4.7 MMOL/L — SIGNIFICANT CHANGE UP (ref 3.5–5.3)
PROT SERPL-MCNC: 4.8 G/DL — LOW (ref 6–8.3)
RBC # BLD: 1.64 M/UL — LOW (ref 4.2–5.8)
RBC # FLD: 31 % — HIGH (ref 10.3–14.5)
SODIUM SERPL-SCNC: 143 MMOL/L — SIGNIFICANT CHANGE UP (ref 135–145)
URATE SERPL-MCNC: 4.2 MG/DL — SIGNIFICANT CHANGE UP (ref 3.4–8.8)
VIT B1 SERPL-MCNC: 298.4 NMOL/L — HIGH (ref 66.5–200)
WBC # BLD: 177.25 K/UL — CRITICAL HIGH (ref 3.8–10.5)
WBC # FLD AUTO: 177.25 K/UL — CRITICAL HIGH (ref 3.8–10.5)

## 2017-04-26 PROCEDURE — 99232 SBSQ HOSP IP/OBS MODERATE 35: CPT | Mod: GC

## 2017-04-26 PROCEDURE — 99233 SBSQ HOSP IP/OBS HIGH 50: CPT | Mod: GC

## 2017-04-26 RX ORDER — ONDANSETRON 8 MG/1
16 TABLET, FILM COATED ORAL ONCE
Qty: 0 | Refills: 0 | Status: DISCONTINUED | OUTPATIENT
Start: 2017-04-26 | End: 2017-04-29

## 2017-04-26 RX ORDER — SODIUM CHLORIDE 9 MG/ML
1000 INJECTION INTRAMUSCULAR; INTRAVENOUS; SUBCUTANEOUS
Qty: 0 | Refills: 0 | Status: DISCONTINUED | OUTPATIENT
Start: 2017-04-26 | End: 2017-04-26

## 2017-04-26 RX ORDER — IPRATROPIUM/ALBUTEROL SULFATE 18-103MCG
3 AEROSOL WITH ADAPTER (GRAM) INHALATION EVERY 4 HOURS
Qty: 0 | Refills: 0 | Status: DISCONTINUED | OUTPATIENT
Start: 2017-04-26 | End: 2017-04-29

## 2017-04-26 RX ORDER — DEXAMETHASONE 0.5 MG/5ML
12 ELIXIR ORAL DAILY
Qty: 0 | Refills: 0 | Status: DISCONTINUED | OUTPATIENT
Start: 2017-04-26 | End: 2017-04-29

## 2017-04-26 RX ORDER — CYCLOPHOSPHAMIDE 100 MG
1026 VIAL (EA) INTRAVENOUS ONCE
Qty: 0 | Refills: 0 | Status: DISCONTINUED | OUTPATIENT
Start: 2017-04-26 | End: 2017-04-29

## 2017-04-26 RX ORDER — FUROSEMIDE 40 MG
20 TABLET ORAL ONCE
Qty: 0 | Refills: 0 | Status: COMPLETED | OUTPATIENT
Start: 2017-04-26 | End: 2017-04-26

## 2017-04-26 RX ADMIN — Medication 3 MILLILITER(S): at 12:03

## 2017-04-26 RX ADMIN — Medication 3 MILLILITER(S): at 04:02

## 2017-04-26 RX ADMIN — SODIUM CHLORIDE 100 MILLILITER(S): 9 INJECTION INTRAMUSCULAR; INTRAVENOUS; SUBCUTANEOUS at 05:51

## 2017-04-26 RX ADMIN — Medication 300 MILLIGRAM(S): at 15:44

## 2017-04-26 RX ADMIN — Medication 3 MILLILITER(S): at 15:08

## 2017-04-26 RX ADMIN — AMLODIPINE BESYLATE 10 MILLIGRAM(S): 2.5 TABLET ORAL at 05:50

## 2017-04-26 RX ADMIN — FINASTERIDE 5 MILLIGRAM(S): 5 TABLET, FILM COATED ORAL at 15:43

## 2017-04-26 RX ADMIN — Medication 3 MILLILITER(S): at 21:31

## 2017-04-26 RX ADMIN — Medication 100 MILLIGRAM(S): at 15:44

## 2017-04-26 RX ADMIN — Medication 20 MILLIGRAM(S): at 17:09

## 2017-04-26 RX ADMIN — Medication 125 MICROGRAM(S): at 05:50

## 2017-04-26 RX ADMIN — Medication 1 MILLIGRAM(S): at 15:43

## 2017-04-27 LAB
ALBUMIN SERPL ELPH-MCNC: 3.4 G/DL — SIGNIFICANT CHANGE UP (ref 3.3–5)
ALP SERPL-CCNC: 84 U/L — SIGNIFICANT CHANGE UP (ref 40–120)
ALT FLD-CCNC: 25 U/L — SIGNIFICANT CHANGE UP (ref 4–41)
ANISOCYTOSIS BLD QL: SIGNIFICANT CHANGE UP
AST SERPL-CCNC: 28 U/L — SIGNIFICANT CHANGE UP (ref 4–40)
BASOPHILS NFR SPEC: 0 % — SIGNIFICANT CHANGE UP (ref 0–2)
BILIRUB SERPL-MCNC: 1 MG/DL — SIGNIFICANT CHANGE UP (ref 0.2–1.2)
BLD GP AB SCN SERPL QL: POSITIVE — SIGNIFICANT CHANGE UP
BUN SERPL-MCNC: 25 MG/DL — HIGH (ref 7–23)
CALCIUM SERPL-MCNC: 6.9 MG/DL — LOW (ref 8.4–10.5)
CHLORIDE SERPL-SCNC: 113 MMOL/L — HIGH (ref 98–107)
CO2 SERPL-SCNC: 17 MMOL/L — LOW (ref 22–31)
CREAT SERPL-MCNC: 1.11 MG/DL — SIGNIFICANT CHANGE UP (ref 0.5–1.3)
DAT C3-SP REAG RBC QL: POSITIVE — SIGNIFICANT CHANGE UP
DAT POLY-SP REAG RBC QL: POSITIVE — SIGNIFICANT CHANGE UP
DIRECT COOMBS IGG: POSITIVE — SIGNIFICANT CHANGE UP
EOSINOPHIL NFR FLD: 0 % — SIGNIFICANT CHANGE UP (ref 0–6)
GLUCOSE SERPL-MCNC: 135 MG/DL — HIGH (ref 70–99)
HAPTOGLOB SERPL-MCNC: 71 MG/DL — SIGNIFICANT CHANGE UP (ref 34–200)
HCT VFR BLD CALC: 17.1 % — CRITICAL LOW (ref 39–50)
HGB BLD-MCNC: 5 G/DL — CRITICAL LOW (ref 13–17)
HYPOCHROMIA BLD QL: SLIGHT — SIGNIFICANT CHANGE UP
LDH SERPL L TO P-CCNC: 433 U/L — HIGH (ref 135–225)
LYMPHOCYTES NFR SPEC AUTO: 93.1 % — HIGH (ref 13–44)
MACROCYTES BLD QL: SIGNIFICANT CHANGE UP
MCHC RBC-ENTMCNC: 29.2 % — LOW (ref 32–36)
MCHC RBC-ENTMCNC: 35.2 PG — HIGH (ref 27–34)
MCV RBC AUTO: 120.4 FL — HIGH (ref 80–100)
MICROCYTES BLD QL: SLIGHT — SIGNIFICANT CHANGE UP
MONOCYTES NFR BLD: 0 % — LOW (ref 2–9)
NEUTROPHIL AB SER-ACNC: 6.9 % — LOW (ref 43–77)
OVALOCYTES BLD QL SMEAR: SLIGHT — SIGNIFICANT CHANGE UP
PLATELET # BLD AUTO: 26 K/UL — LOW (ref 150–400)
PLATELET COUNT - ESTIMATE: SIGNIFICANT CHANGE UP
PMV BLD: 10.4 FL — SIGNIFICANT CHANGE UP (ref 7–13)
POLYCHROMASIA BLD QL SMEAR: SLIGHT — SIGNIFICANT CHANGE UP
POTASSIUM SERPL-MCNC: 4.3 MMOL/L — SIGNIFICANT CHANGE UP (ref 3.5–5.3)
POTASSIUM SERPL-SCNC: 4.3 MMOL/L — SIGNIFICANT CHANGE UP (ref 3.5–5.3)
PROT SERPL-MCNC: 4.7 G/DL — LOW (ref 6–8.3)
RBC # BLD: 1.42 M/UL — LOW (ref 4.2–5.8)
RBC # FLD: 29.7 % — HIGH (ref 10.3–14.5)
RH IG SCN BLD-IMP: POSITIVE — SIGNIFICANT CHANGE UP
SODIUM SERPL-SCNC: 146 MMOL/L — HIGH (ref 135–145)
URATE SERPL-MCNC: 4.7 MG/DL — SIGNIFICANT CHANGE UP (ref 3.4–8.8)
WBC # BLD: 144.47 K/UL — CRITICAL HIGH (ref 3.8–10.5)
WBC # FLD AUTO: 144.47 K/UL — CRITICAL HIGH (ref 3.8–10.5)

## 2017-04-27 PROCEDURE — 71010: CPT | Mod: 26

## 2017-04-27 PROCEDURE — 99233 SBSQ HOSP IP/OBS HIGH 50: CPT | Mod: GC

## 2017-04-27 PROCEDURE — 99231 SBSQ HOSP IP/OBS SF/LOW 25: CPT | Mod: GC

## 2017-04-27 RX ORDER — DEXAMETHASONE 0.5 MG/5ML
12 ELIXIR ORAL DAILY
Qty: 0 | Refills: 0 | Status: DISCONTINUED | OUTPATIENT
Start: 2017-04-28 | End: 2017-04-29

## 2017-04-27 RX ORDER — PANTOPRAZOLE SODIUM 20 MG/1
40 TABLET, DELAYED RELEASE ORAL
Qty: 0 | Refills: 0 | Status: DISCONTINUED | OUTPATIENT
Start: 2017-04-27 | End: 2017-04-29

## 2017-04-27 RX ORDER — FUROSEMIDE 40 MG
20 TABLET ORAL ONCE
Qty: 0 | Refills: 0 | Status: COMPLETED | OUTPATIENT
Start: 2017-04-27 | End: 2017-04-27

## 2017-04-27 RX ORDER — ACETAMINOPHEN 500 MG
650 TABLET ORAL ONCE
Qty: 0 | Refills: 0 | Status: COMPLETED | OUTPATIENT
Start: 2017-04-27 | End: 2017-04-27

## 2017-04-27 RX ORDER — DIPHENHYDRAMINE HCL 50 MG
25 CAPSULE ORAL ONCE
Qty: 0 | Refills: 0 | Status: COMPLETED | OUTPATIENT
Start: 2017-04-27 | End: 2017-04-27

## 2017-04-27 RX ADMIN — Medication 20 MILLIGRAM(S): at 20:14

## 2017-04-27 RX ADMIN — Medication 3 MILLILITER(S): at 21:16

## 2017-04-27 RX ADMIN — Medication 3 MILLILITER(S): at 01:55

## 2017-04-27 RX ADMIN — AMLODIPINE BESYLATE 10 MILLIGRAM(S): 2.5 TABLET ORAL at 06:02

## 2017-04-27 RX ADMIN — Medication 3 MILLILITER(S): at 08:14

## 2017-04-27 RX ADMIN — FINASTERIDE 5 MILLIGRAM(S): 5 TABLET, FILM COATED ORAL at 11:42

## 2017-04-27 RX ADMIN — Medication 3 MILLILITER(S): at 12:32

## 2017-04-27 RX ADMIN — Medication 3 MILLILITER(S): at 04:46

## 2017-04-27 RX ADMIN — Medication 125 MICROGRAM(S): at 06:02

## 2017-04-27 RX ADMIN — Medication 1 MILLIGRAM(S): at 11:42

## 2017-04-27 RX ADMIN — Medication 3 MILLILITER(S): at 17:10

## 2017-04-27 RX ADMIN — Medication 25 MILLIGRAM(S): at 16:35

## 2017-04-27 RX ADMIN — Medication 300 MILLIGRAM(S): at 11:42

## 2017-04-27 RX ADMIN — PANTOPRAZOLE SODIUM 40 MILLIGRAM(S): 20 TABLET, DELAYED RELEASE ORAL at 21:15

## 2017-04-27 RX ADMIN — Medication 650 MILLIGRAM(S): at 16:35

## 2017-04-27 RX ADMIN — Medication 100 MILLIGRAM(S): at 06:02

## 2017-04-27 RX ADMIN — Medication 100 MILLIGRAM(S): at 11:42

## 2017-04-27 RX ADMIN — Medication 20 MILLIGRAM(S): at 15:34

## 2017-04-28 ENCOUNTER — APPOINTMENT (OUTPATIENT)
Dept: PULMONOLOGY | Facility: CLINIC | Age: 82
End: 2017-04-28

## 2017-04-28 LAB
BACTERIA BLD CULT: SIGNIFICANT CHANGE UP
BACTERIA BLD CULT: SIGNIFICANT CHANGE UP
BUN SERPL-MCNC: 31 MG/DL — HIGH (ref 7–23)
CALCIUM SERPL-MCNC: 7.1 MG/DL — LOW (ref 8.4–10.5)
CHLORIDE SERPL-SCNC: 111 MMOL/L — HIGH (ref 98–107)
CO2 SERPL-SCNC: 18 MMOL/L — LOW (ref 22–31)
CREAT SERPL-MCNC: 1.22 MG/DL — SIGNIFICANT CHANGE UP (ref 0.5–1.3)
GLUCOSE SERPL-MCNC: 105 MG/DL — HIGH (ref 70–99)
HCT VFR BLD CALC: 24.6 % — LOW (ref 39–50)
HCT VFR BLD CALC: 29.1 % — LOW (ref 39–50)
HGB BLD-MCNC: 6.6 G/DL — CRITICAL LOW (ref 13–17)
HGB BLD-MCNC: 8.1 G/DL — LOW (ref 13–17)
MAGNESIUM SERPL-MCNC: 1.6 MG/DL — SIGNIFICANT CHANGE UP (ref 1.6–2.6)
MCHC RBC-ENTMCNC: 26.8 % — LOW (ref 32–36)
MCHC RBC-ENTMCNC: 27.8 % — LOW (ref 32–36)
MCHC RBC-ENTMCNC: 31.1 PG — SIGNIFICANT CHANGE UP (ref 27–34)
MCHC RBC-ENTMCNC: 31.2 PG — SIGNIFICANT CHANGE UP (ref 27–34)
MCV RBC AUTO: 111.9 FL — HIGH (ref 80–100)
MCV RBC AUTO: 116 FL — HIGH (ref 80–100)
PHOSPHATE SERPL-MCNC: 4.3 MG/DL — SIGNIFICANT CHANGE UP (ref 2.5–4.5)
PLATELET # BLD AUTO: 32 K/UL — LOW (ref 150–400)
PLATELET # BLD AUTO: 35 K/UL — LOW (ref 150–400)
PMV BLD: 10.5 FL — SIGNIFICANT CHANGE UP (ref 7–13)
PMV BLD: 9.6 FL — SIGNIFICANT CHANGE UP (ref 7–13)
POTASSIUM SERPL-MCNC: 4.5 MMOL/L — SIGNIFICANT CHANGE UP (ref 3.5–5.3)
POTASSIUM SERPL-SCNC: 4.5 MMOL/L — SIGNIFICANT CHANGE UP (ref 3.5–5.3)
RBC # BLD: 2.12 M/UL — LOW (ref 4.2–5.8)
RBC # BLD: 2.6 M/UL — LOW (ref 4.2–5.8)
RBC # FLD: 32.6 % — HIGH (ref 10.3–14.5)
RBC # FLD: 35.7 % — HIGH (ref 10.3–14.5)
SODIUM SERPL-SCNC: 144 MMOL/L — SIGNIFICANT CHANGE UP (ref 135–145)
WBC # BLD: 190 K/UL — CRITICAL HIGH (ref 3.8–10.5)
WBC # BLD: 224.87 K/UL — CRITICAL HIGH (ref 3.8–10.5)
WBC # FLD AUTO: 190 K/UL — CRITICAL HIGH (ref 3.8–10.5)
WBC # FLD AUTO: 224.87 K/UL — CRITICAL HIGH (ref 3.8–10.5)

## 2017-04-28 PROCEDURE — 99233 SBSQ HOSP IP/OBS HIGH 50: CPT | Mod: GC

## 2017-04-28 PROCEDURE — 99231 SBSQ HOSP IP/OBS SF/LOW 25: CPT | Mod: GC

## 2017-04-28 RX ORDER — ACETAMINOPHEN 500 MG
650 TABLET ORAL ONCE
Qty: 0 | Refills: 0 | Status: COMPLETED | OUTPATIENT
Start: 2017-04-28 | End: 2017-04-28

## 2017-04-28 RX ORDER — DIPHENHYDRAMINE HCL 50 MG
25 CAPSULE ORAL ONCE
Qty: 0 | Refills: 0 | Status: COMPLETED | OUTPATIENT
Start: 2017-04-28 | End: 2017-04-28

## 2017-04-28 RX ADMIN — Medication 3 MILLILITER(S): at 08:40

## 2017-04-28 RX ADMIN — Medication 125 MICROGRAM(S): at 05:33

## 2017-04-28 RX ADMIN — Medication 1 MILLIGRAM(S): at 12:38

## 2017-04-28 RX ADMIN — Medication 3 MILLILITER(S): at 12:39

## 2017-04-28 RX ADMIN — Medication 3 MILLILITER(S): at 17:13

## 2017-04-28 RX ADMIN — AMLODIPINE BESYLATE 10 MILLIGRAM(S): 2.5 TABLET ORAL at 05:33

## 2017-04-28 RX ADMIN — PANTOPRAZOLE SODIUM 40 MILLIGRAM(S): 20 TABLET, DELAYED RELEASE ORAL at 07:04

## 2017-04-28 RX ADMIN — Medication 25 MILLIGRAM(S): at 14:30

## 2017-04-28 RX ADMIN — Medication 650 MILLIGRAM(S): at 15:00

## 2017-04-28 RX ADMIN — Medication 3 MILLILITER(S): at 20:39

## 2017-04-28 RX ADMIN — Medication 106 MILLIGRAM(S): at 07:04

## 2017-04-28 RX ADMIN — Medication 3 MILLILITER(S): at 04:14

## 2017-04-28 RX ADMIN — Medication 200 MILLIGRAM(S): at 19:36

## 2017-04-28 RX ADMIN — Medication 300 MILLIGRAM(S): at 12:37

## 2017-04-28 RX ADMIN — Medication 3 MILLILITER(S): at 01:43

## 2017-04-28 RX ADMIN — Medication 200 MILLIGRAM(S): at 09:49

## 2017-04-28 RX ADMIN — FINASTERIDE 5 MILLIGRAM(S): 5 TABLET, FILM COATED ORAL at 12:38

## 2017-04-28 NOTE — PROVIDER CONTACT NOTE (OTHER) - ACTION/TREATMENT ORDERED:
MD intend to order cbc, will continue to monitor
Provider aware
No action given at this, will continue to monitor
No action give at this time, will continue to monitor
No orders at present

## 2017-04-28 NOTE — PROVIDER CONTACT NOTE (OTHER) - SITUATION
Critical Lab: .47, Hgb 5, Hct 17.1
Lab results as above stated
Pt /48
s/p Lasix/nebulizer
6AM cbc qns

## 2017-04-29 VITALS — OXYGEN SATURATION: 97 %

## 2017-04-29 LAB
BUN SERPL-MCNC: 30 MG/DL — HIGH (ref 7–23)
CALCIUM SERPL-MCNC: 7.5 MG/DL — LOW (ref 8.4–10.5)
CHLORIDE SERPL-SCNC: 111 MMOL/L — HIGH (ref 98–107)
CO2 SERPL-SCNC: 16 MMOL/L — LOW (ref 22–31)
CREAT SERPL-MCNC: 1.08 MG/DL — SIGNIFICANT CHANGE UP (ref 0.5–1.3)
GLUCOSE SERPL-MCNC: 95 MG/DL — SIGNIFICANT CHANGE UP (ref 70–99)
POTASSIUM SERPL-MCNC: 4.8 MMOL/L — SIGNIFICANT CHANGE UP (ref 3.5–5.3)
POTASSIUM SERPL-SCNC: 4.8 MMOL/L — SIGNIFICANT CHANGE UP (ref 3.5–5.3)
SODIUM SERPL-SCNC: 142 MMOL/L — SIGNIFICANT CHANGE UP (ref 135–145)

## 2017-04-29 PROCEDURE — 99232 SBSQ HOSP IP/OBS MODERATE 35: CPT | Mod: GC

## 2017-04-29 PROCEDURE — 99239 HOSP IP/OBS DSCHRG MGMT >30: CPT

## 2017-04-29 RX ADMIN — Medication 3 MILLILITER(S): at 00:17

## 2017-04-29 RX ADMIN — FINASTERIDE 5 MILLIGRAM(S): 5 TABLET, FILM COATED ORAL at 12:00

## 2017-04-29 RX ADMIN — PANTOPRAZOLE SODIUM 40 MILLIGRAM(S): 20 TABLET, DELAYED RELEASE ORAL at 06:23

## 2017-04-29 RX ADMIN — Medication 125 MICROGRAM(S): at 06:23

## 2017-04-29 RX ADMIN — Medication 300 MILLIGRAM(S): at 12:44

## 2017-04-29 RX ADMIN — Medication 1 MILLIGRAM(S): at 12:00

## 2017-04-29 RX ADMIN — Medication 200 MILLIGRAM(S): at 06:23

## 2017-04-29 RX ADMIN — Medication 3 MILLILITER(S): at 04:23

## 2017-04-29 RX ADMIN — Medication 3 MILLILITER(S): at 10:25

## 2017-04-29 RX ADMIN — AMLODIPINE BESYLATE 10 MILLIGRAM(S): 2.5 TABLET ORAL at 06:38

## 2017-04-29 RX ADMIN — Medication 106 MILLIGRAM(S): at 06:23

## 2017-05-01 ENCOUNTER — INPATIENT (INPATIENT)
Facility: HOSPITAL | Age: 82
LOS: 7 days | Discharge: HOME HEALTH | End: 2017-05-09
Attending: INTERNAL MEDICINE | Admitting: INTERNAL MEDICINE
Payer: MEDICARE

## 2017-05-01 VITALS
TEMPERATURE: 99 F | RESPIRATION RATE: 17 BRPM | HEART RATE: 84 BPM | SYSTOLIC BLOOD PRESSURE: 138 MMHG | DIASTOLIC BLOOD PRESSURE: 54 MMHG | OXYGEN SATURATION: 92 %

## 2017-05-01 DIAGNOSIS — I10 ESSENTIAL (PRIMARY) HYPERTENSION: ICD-10-CM
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
## 2017-05-01 DIAGNOSIS — E03.9 HYPOTHYROIDISM, UNSPECIFIED: ICD-10-CM

## 2017-05-01 DIAGNOSIS — A41.9 SEPSIS, UNSPECIFIED ORGANISM: ICD-10-CM

## 2017-05-01 DIAGNOSIS — R94.31 ABNORMAL ELECTROCARDIOGRAM [ECG] [EKG]: ICD-10-CM

## 2017-05-01 DIAGNOSIS — R50.9 FEVER, UNSPECIFIED: ICD-10-CM

## 2017-05-01 DIAGNOSIS — G93.41 METABOLIC ENCEPHALOPATHY: ICD-10-CM

## 2017-05-01 DIAGNOSIS — J90 PLEURAL EFFUSION, NOT ELSEWHERE CLASSIFIED: ICD-10-CM

## 2017-05-01 DIAGNOSIS — N40.0 BENIGN PROSTATIC HYPERPLASIA WITHOUT LOWER URINARY TRACT SYMPTOMS: ICD-10-CM

## 2017-05-01 DIAGNOSIS — D59.1 OTHER AUTOIMMUNE HEMOLYTIC ANEMIAS: ICD-10-CM

## 2017-05-01 DIAGNOSIS — K92.1 MELENA: ICD-10-CM

## 2017-05-01 LAB
B PERT DNA SPEC QL NAA+PROBE: SIGNIFICANT CHANGE UP
C PNEUM DNA SPEC QL NAA+PROBE: NOT DETECTED — SIGNIFICANT CHANGE UP
CK MB BLD-MCNC: 2.07 NG/ML — SIGNIFICANT CHANGE UP (ref 1–6.6)
CK MB BLD-MCNC: SIGNIFICANT CHANGE UP (ref 0–2.5)
CK SERPL-CCNC: 49 U/L — SIGNIFICANT CHANGE UP (ref 30–200)
FLUAV H1 2009 PAND RNA SPEC QL NAA+PROBE: NOT DETECTED — SIGNIFICANT CHANGE UP
FLUAV H1 RNA SPEC QL NAA+PROBE: NOT DETECTED — SIGNIFICANT CHANGE UP
FLUAV H3 RNA SPEC QL NAA+PROBE: NOT DETECTED — SIGNIFICANT CHANGE UP
FLUAV SUBTYP SPEC NAA+PROBE: SIGNIFICANT CHANGE UP
FLUBV RNA SPEC QL NAA+PROBE: NOT DETECTED — SIGNIFICANT CHANGE UP
HADV DNA SPEC QL NAA+PROBE: NOT DETECTED — SIGNIFICANT CHANGE UP
HCOV 229E RNA SPEC QL NAA+PROBE: NOT DETECTED — SIGNIFICANT CHANGE UP
HCOV HKU1 RNA SPEC QL NAA+PROBE: NOT DETECTED — SIGNIFICANT CHANGE UP
HCOV NL63 RNA SPEC QL NAA+PROBE: NOT DETECTED — SIGNIFICANT CHANGE UP
HCOV OC43 RNA SPEC QL NAA+PROBE: NOT DETECTED — SIGNIFICANT CHANGE UP
HMPV RNA SPEC QL NAA+PROBE: NOT DETECTED — SIGNIFICANT CHANGE UP
HPIV1 RNA SPEC QL NAA+PROBE: NOT DETECTED — SIGNIFICANT CHANGE UP
HPIV2 RNA SPEC QL NAA+PROBE: NOT DETECTED — SIGNIFICANT CHANGE UP
HPIV3 RNA SPEC QL NAA+PROBE: NOT DETECTED — SIGNIFICANT CHANGE UP
HPIV4 RNA SPEC QL NAA+PROBE: NOT DETECTED — SIGNIFICANT CHANGE UP
LACTATE SERPL-SCNC: 1.6 MMOL/L — SIGNIFICANT CHANGE UP (ref 0.5–2)
LDH SERPL L TO P-CCNC: 643 U/L — HIGH (ref 135–225)
M PNEUMO DNA SPEC QL NAA+PROBE: NOT DETECTED — SIGNIFICANT CHANGE UP
RSV RNA SPEC QL NAA+PROBE: NOT DETECTED — SIGNIFICANT CHANGE UP
RV+EV RNA SPEC QL NAA+PROBE: POSITIVE — HIGH
TROPONIN T SERPL-MCNC: < 0.06 NG/ML — SIGNIFICANT CHANGE UP (ref 0–0.06)

## 2017-05-01 PROCEDURE — 71010: CPT | Mod: 26

## 2017-05-01 RX ORDER — IPRATROPIUM/ALBUTEROL SULFATE 18-103MCG
3 AEROSOL WITH ADAPTER (GRAM) INHALATION EVERY 6 HOURS
Qty: 0 | Refills: 0 | Status: DISCONTINUED | OUTPATIENT
Start: 2017-05-01 | End: 2017-05-09

## 2017-05-01 RX ORDER — FUROSEMIDE 40 MG
40 TABLET ORAL ONCE
Qty: 0 | Refills: 0 | Status: COMPLETED | OUTPATIENT
Start: 2017-05-01 | End: 2017-05-01

## 2017-05-01 RX ORDER — PIPERACILLIN AND TAZOBACTAM 4; .5 G/20ML; G/20ML
3.38 INJECTION, POWDER, LYOPHILIZED, FOR SOLUTION INTRAVENOUS ONCE
Qty: 0 | Refills: 0 | Status: COMPLETED | OUTPATIENT
Start: 2017-05-01 | End: 2017-05-01

## 2017-05-01 RX ORDER — VANCOMYCIN HCL 1 G
1000 VIAL (EA) INTRAVENOUS EVERY 12 HOURS
Qty: 0 | Refills: 0 | Status: DISCONTINUED | OUTPATIENT
Start: 2017-05-01 | End: 2017-05-09

## 2017-05-01 RX ORDER — AZITHROMYCIN 500 MG/1
500 TABLET, FILM COATED ORAL EVERY 24 HOURS
Qty: 0 | Refills: 0 | Status: DISCONTINUED | OUTPATIENT
Start: 2017-05-01 | End: 2017-05-03

## 2017-05-01 RX ORDER — PIPERACILLIN AND TAZOBACTAM 4; .5 G/20ML; G/20ML
3.38 INJECTION, POWDER, LYOPHILIZED, FOR SOLUTION INTRAVENOUS EVERY 8 HOURS
Qty: 0 | Refills: 0 | Status: DISCONTINUED | OUTPATIENT
Start: 2017-05-02 | End: 2017-05-08

## 2017-05-01 RX ORDER — ATORVASTATIN CALCIUM 80 MG/1
20 TABLET, FILM COATED ORAL AT BEDTIME
Qty: 0 | Refills: 0 | Status: DISCONTINUED | OUTPATIENT
Start: 2017-05-01 | End: 2017-05-09

## 2017-05-01 RX ORDER — PANTOPRAZOLE SODIUM 20 MG/1
40 TABLET, DELAYED RELEASE ORAL EVERY 12 HOURS
Qty: 0 | Refills: 0 | Status: DISCONTINUED | OUTPATIENT
Start: 2017-05-01 | End: 2017-05-06

## 2017-05-01 RX ORDER — LEVOTHYROXINE SODIUM 125 MCG
100 TABLET ORAL DAILY
Qty: 0 | Refills: 0 | Status: DISCONTINUED | OUTPATIENT
Start: 2017-05-01 | End: 2017-05-09

## 2017-05-01 RX ORDER — ACETAMINOPHEN 500 MG
650 TABLET ORAL ONCE
Qty: 0 | Refills: 0 | Status: COMPLETED | OUTPATIENT
Start: 2017-05-01 | End: 2017-05-01

## 2017-05-01 RX ORDER — SODIUM CHLORIDE 9 MG/ML
1000 INJECTION INTRAMUSCULAR; INTRAVENOUS; SUBCUTANEOUS ONCE
Qty: 0 | Refills: 0 | Status: COMPLETED | OUTPATIENT
Start: 2017-05-01 | End: 2017-05-01

## 2017-05-01 RX ORDER — SODIUM CHLORIDE 9 MG/ML
4 INJECTION INTRAMUSCULAR; INTRAVENOUS; SUBCUTANEOUS EVERY 6 HOURS
Qty: 0 | Refills: 0 | Status: DISCONTINUED | OUTPATIENT
Start: 2017-05-01 | End: 2017-05-02

## 2017-05-01 RX ORDER — VANCOMYCIN HCL 1 G
1000 VIAL (EA) INTRAVENOUS ONCE
Qty: 0 | Refills: 0 | Status: DISCONTINUED | OUTPATIENT
Start: 2017-05-01 | End: 2017-05-01

## 2017-05-01 RX ORDER — ALLOPURINOL 300 MG
300 TABLET ORAL DAILY
Qty: 0 | Refills: 0 | Status: DISCONTINUED | OUTPATIENT
Start: 2017-05-01 | End: 2017-05-09

## 2017-05-01 RX ORDER — FINASTERIDE 5 MG/1
5 TABLET, FILM COATED ORAL DAILY
Qty: 0 | Refills: 0 | Status: DISCONTINUED | OUTPATIENT
Start: 2017-05-01 | End: 2017-05-09

## 2017-05-01 RX ORDER — FOLIC ACID 0.8 MG
1 TABLET ORAL DAILY
Qty: 0 | Refills: 0 | Status: DISCONTINUED | OUTPATIENT
Start: 2017-05-01 | End: 2017-05-09

## 2017-05-01 RX ORDER — AMLODIPINE BESYLATE 2.5 MG/1
10 TABLET ORAL DAILY
Qty: 0 | Refills: 0 | Status: DISCONTINUED | OUTPATIENT
Start: 2017-05-01 | End: 2017-05-09

## 2017-05-01 RX ADMIN — PIPERACILLIN AND TAZOBACTAM 200 GRAM(S): 4; .5 INJECTION, POWDER, LYOPHILIZED, FOR SOLUTION INTRAVENOUS at 18:55

## 2017-05-01 RX ADMIN — Medication 3 MILLILITER(S): at 23:54

## 2017-05-01 RX ADMIN — Medication 250 MILLIGRAM(S): at 20:47

## 2017-05-01 RX ADMIN — PANTOPRAZOLE SODIUM 40 MILLIGRAM(S): 20 TABLET, DELAYED RELEASE ORAL at 21:16

## 2017-05-01 RX ADMIN — ATORVASTATIN CALCIUM 20 MILLIGRAM(S): 80 TABLET, FILM COATED ORAL at 23:26

## 2017-05-01 RX ADMIN — SODIUM CHLORIDE 4 MILLILITER(S): 9 INJECTION INTRAMUSCULAR; INTRAVENOUS; SUBCUTANEOUS at 23:58

## 2017-05-01 RX ADMIN — AMLODIPINE BESYLATE 10 MILLIGRAM(S): 2.5 TABLET ORAL at 20:46

## 2017-05-01 RX ADMIN — Medication 40 MILLIGRAM(S): at 21:14

## 2017-05-01 RX ADMIN — Medication 650 MILLIGRAM(S): at 18:38

## 2017-05-01 RX ADMIN — AZITHROMYCIN 250 MILLIGRAM(S): 500 TABLET, FILM COATED ORAL at 23:25

## 2017-05-01 RX ADMIN — SODIUM CHLORIDE 1000 MILLILITER(S): 9 INJECTION INTRAMUSCULAR; INTRAVENOUS; SUBCUTANEOUS at 18:30

## 2017-05-01 NOTE — H&P ADULT. - PROBLEM SELECTOR PLAN 4
- Pt complaining of Dark BMs.   - Check FOBT  - Monitor H/H  - Pt is thrombocytopenic putting him at risk for GIB  - At this time pt is not candidate for scope, will monitor closely and start Protonix 40 BID.

## 2017-05-01 NOTE — ED ADULT NURSE NOTE - CHIEF COMPLAINT QUOTE
C/o sob, fevers/chills, and diarrhea since today and chronic productive cough with white sputum. D/C from [facility] on Saturday. PMH CLL on chemo. Edema noted to left arm. Spouse later reports that pt fell yesterday. Pt states he does not know what happened. + LOC. Facial ecchymosis noted.

## 2017-05-01 NOTE — ED ADULT TRIAGE NOTE - CHIEF COMPLAINT QUOTE
C/o sob, fevers/chills, and diarrhea since today and chronic productive cough with white sputum. D/C from [facility] on Saturday. PMH CLL on chemo. Edema noted to left arm. Spouse later reports that pt fell yesterday. Pt states he does not know what happened. + LOC. Facial ecchymosis noted.

## 2017-05-01 NOTE — ED ADULT NURSE REASSESSMENT NOTE - NS ED NURSE REASSESS COMMENT FT1
float RN - pt remains AAOx3 at shift change, pt VS as noted, in NAD, resting comfortably, family at bedside, pt awaiting bed assignment, medicated per orders, report given to primary RN in area.

## 2017-05-01 NOTE — H&P ADULT. - PROBLEM SELECTOR PLAN 3
- Fall likely mechanical given due to generalized weakness with sepsis.  - However, given EKG changed will not be able to r/o cardiac cause at this time  - Monitor on tele  - CE x 3  - TTE

## 2017-05-01 NOTE — ED PROVIDER NOTE - MEDICAL DECISION MAKING DETAILS
85 yo M hx of CLL here for diarrhea, SOB, cough. Many chronic symptoms. Pt otherwise at baseline. PLAN: labs, CXR

## 2017-05-01 NOTE — H&P ADULT. - ASSESSMENT
86 M with a PMHx of CLL (last chemo 4/25 done inpatient at [facility]), autoimmune hemolytic anemia, hypothyroidism, BPH, and HTN that presents to the [facility] ED after having worsening shortness of breath and diarrhea. Found to be septic with likely respiratory source. Pt also had Dark BMs concerning for melena and possible heart block.

## 2017-05-01 NOTE — ED PROVIDER NOTE - CONSTITUTIONAL, MLM
normal... Non toxic appearing, awake, alert, oriented to person, place, time/situation and in no apparent distress.

## 2017-05-01 NOTE — H&P ADULT. - EKG AND INTERPRETATION
Afib v 2nd degree HB EKG personally reviewed - 90bpm NSR vs MAT with escape intermittent ?junctional rhythm

## 2017-05-01 NOTE — ED ADULT NURSE NOTE - OBJECTIVE STATEMENT
pt alert and oriented x3, family at bedside, states pt is on chemo, started having fevers chills and many episodes of diahrea today. pt states no pain at this time. pt also has cough with white sputum.   20 g placed to upper right arm, labs drawn and sent.  pt states he ambulates with use of a cane. pt alert and oriented x3, family at bedside, states pt is on chemo, started having fevers chills and many episodes of diahrea today. pt states no pain at this time. pt also has cough with white sputum.   20 g placed to upper right arm, labs drawn and sent.  pt states he ambulates with use of a cane.  pt with some redness to sacral area, intact.   brusiing to bilateral arms, as per family all from IV sticks during admission.

## 2017-05-01 NOTE — ED PROVIDER NOTE - OBJECTIVE STATEMENT
87 yo M w/ PMH of CLL s/p chemo, autoimmune hemolytic anemia, hx of right sided pleural effusion, hypothyroidism, HTN, BPH here with diarrhea, chills x today. family states that today patient developed non bloody diarrhea, multiple episodes today, "forgets" how many times. Also with cough and SOB-- chronic symptom that family states patient always has. Also yesterday patient fell while walking to bathroom family denies LOC or vomiting after, family helped him up and he was "fine" after. Denies fever, chills, vomiting, CP, HA, weakness

## 2017-05-01 NOTE — ED PROVIDER NOTE - ATTENDING CONTRIBUTION TO CARE
Raymond: 86 yom with CLL, chills, today, fall yesterday, sob worsenign on chronic, diarrhea for 2 days, non bloody, received chemo while in hospital 1 week ago, discharged 2 days ago.  On exam, febrile rectal 101.6,  no distress, ecchymosis on right upper lip in no facial tn or dental tn, clear lungs, normal cardiac, abd soft, edema in 3/4 ext, minimal stool in diaper.  Given fever - till cover broadly, labs, discussed with heme onc, antibxs and ADMIT.  CXR unchanged likely PNA

## 2017-05-01 NOTE — H&P ADULT. - PROBLEM SELECTOR PLAN 5
- On EKG pt seen to have possible Heart block v A-fib with junctional beats.   - As per pt no previous heart issues.   - Monitor on tele. check TTE and CE. - see interpretation above  - As per pt no previous heart issues.   - Monitor on tele. check TTE and CE.  - Cards consult in AM

## 2017-05-01 NOTE — H&P ADULT. - PROBLEM SELECTOR PLAN 6
- Chronic right pleff.   - CXR shows new left sided opacity v pleff.   - Unclear if pts pleff is amendable to diuresis. On last time pts fluid was exudative. - Chronic right pleff.   - CXR shows new left sided opacity v pleff.   - Unclear if pts pleff is amendable to diuresis. On last time pts fluid was exudative, had MIST II protocol in 1/2017 admission.  - Patient follows with house pulm, reconsult in AM

## 2017-05-01 NOTE — ED PROVIDER NOTE - PROGRESS NOTE DETAILS
GUILLE Gabriel: pt found to be febrile with rectal temp, spoke with covering MD at [facility], will admit for fever work up. Labs pending. Hospitalist aware and patient admitted to service.

## 2017-05-01 NOTE — H&P ADULT. - ATTENDING COMMENTS
86-year-old male w/CLL on RCD, autoimmune hemolytic anemia, recurrent right sided pleural effusion (hx pseudomonas) s/p drainage, hypothyroidism, HTN, BPH presenting from home with worsening shortness of breath and diarrhea.  RVP showed (+)enterovirus/rhinovirus.  Anasarcic on exam with audible rales without auscultation, c/w Lasix IV (and monitor Cr). Meets sepsis criteria, lactate initially elevated now wnl.  Will consult pulm in AM for possible recurrent thoracentesis.  Treat for possible superimposed HCAP given chemo and recent admission.  Diarrhea started today, will check stool counts, stool culture, O&P, send C. diff if >3BM. Anemia improved from last CBC.  Resident spoke with hematology fellow on call, will see patient in AM.  Patient did not want to answer my questions on exam.

## 2017-05-01 NOTE — H&P ADULT. - PROBLEM SELECTOR PLAN 7
- Pt is anemic with increased Bili.   - maybe hemolyzing.   - Monitor H/H for now. - Pt is anemic with increased Bili.   - likely low level continued hemolysis  - Monitor H/H for now.

## 2017-05-01 NOTE — ED ADULT NURSE REASSESSMENT NOTE - NS ED NURSE REASSESS COMMENT FT1
Pt remains A&Ox3, denies any pain/discomfort, dizziness, SOB, n/v/d, fever, chills at this time. Pt medicated per MD orders, VS as noted in flow sheet. Report given to IRMA Wolfe, pt awaiting transport to [facility]. Contact/droplet precautions intact 2/2 r/o cdiff and RVP results. Will monitor patient closely.

## 2017-05-01 NOTE — H&P ADULT. - PROBLEM SELECTOR PLAN 1
- Pt febrile, with elevated WBC, increased RR, and source of either lung or diarrhea.   - given pts mild hypoxic respiratory distress and CXR finding of new L lung opacity with recent viral infection will treat as post viral bacterial PNA v HAP.  - Pt is edematous. will hold off on any fluids at this time.   - Chest PT, Duonebs, and mucus mobilization.   - Start Vanc, Zosyn, and azithro.  - F/u Cx and urine legionella.

## 2017-05-01 NOTE — H&P ADULT. - HISTORY OF PRESENT ILLNESS
Pt 86 M with a PMHx of CLL (last chemo 4/25 done inpatient at [facility]), autoimmune hemolytic anemia, hypothyroidism, BPH, and HTN that presents to the [facility] ED after having worsening shortness of breath and diarrhea. He was recently discharged from [facility] where he was admitted for CLL and received Chemo on that admission. After returning home he states that he was having worsening shortness of breath. He was able to walk around and stop intermittently to catch his breath, but with in 2 days of leaving the hospital he was unable to move around or talk without getting short of beath. The day PTA he actually tried to get out of bed to use the bathroom, but ended up suffering a mechanical fall and hitting his face on the ground. He did develop some swelling over his lip and jaw, but states that he is currently not in any pain from it. The day of admission the patient started having lose bowel movements The patients wife described them as being dark almost black. The patient denies any NSAID use, hematemesis, abd pain, photophobia, gum bleeding, or headache. He does admit to having chills, fatigue, and weakness.     In the ED the pts vitals were: 101.6, 85, 175/64, 18, 95% RA. He received vanc, zosyn, 1LNS, and Tylenol

## 2017-05-01 NOTE — H&P ADULT. - PROBLEM SELECTOR PLAN 2
- lethargy likely related to acute sepsis related to PNA.   - Monitor for improvement with treatment of sepsis.

## 2017-05-02 ENCOUNTER — APPOINTMENT (OUTPATIENT)
Dept: HEMATOLOGY ONCOLOGY | Facility: CLINIC | Age: 82
End: 2017-05-02

## 2017-05-02 ENCOUNTER — OUTPATIENT (OUTPATIENT)
Dept: OUTPATIENT SERVICES | Facility: HOSPITAL | Age: 82
LOS: 1 days | Discharge: HOME | End: 2017-05-02

## 2017-05-02 DIAGNOSIS — D70.9 NEUTROPENIA, UNSPECIFIED: ICD-10-CM

## 2017-05-02 LAB
ALBUMIN SERPL ELPH-MCNC: 3.3 G/DL — SIGNIFICANT CHANGE UP (ref 3.3–5)
ALP SERPL-CCNC: 76 U/L — SIGNIFICANT CHANGE UP (ref 40–120)
ALT FLD-CCNC: 19 U/L — SIGNIFICANT CHANGE UP (ref 4–41)
ANISOCYTOSIS BLD QL: SLIGHT — SIGNIFICANT CHANGE UP
APPEARANCE UR: CLEAR — SIGNIFICANT CHANGE UP
APTT BLD: 32.9 SEC — SIGNIFICANT CHANGE UP (ref 27.5–37.4)
AST SERPL-CCNC: 30 U/L — SIGNIFICANT CHANGE UP (ref 4–40)
BASOPHILS # BLD AUTO: 0.33 K/UL — HIGH (ref 0–0.2)
BASOPHILS NFR BLD AUTO: 0.2 % — SIGNIFICANT CHANGE UP (ref 0–2)
BASOPHILS NFR SPEC: 0 % — SIGNIFICANT CHANGE UP (ref 0–2)
BILIRUB SERPL-MCNC: 2.6 MG/DL — HIGH (ref 0.2–1.2)
BILIRUB UR-MCNC: NEGATIVE — SIGNIFICANT CHANGE UP
BLOOD UR QL VISUAL: NEGATIVE — SIGNIFICANT CHANGE UP
BUN SERPL-MCNC: 24 MG/DL — HIGH (ref 7–23)
CALCIUM SERPL-MCNC: 7.5 MG/DL — LOW (ref 8.4–10.5)
CHLORIDE SERPL-SCNC: 105 MMOL/L — SIGNIFICANT CHANGE UP (ref 98–107)
CK SERPL-CCNC: 29 U/L — LOW (ref 30–200)
CO2 SERPL-SCNC: 25 MMOL/L — SIGNIFICANT CHANGE UP (ref 22–31)
COLOR SPEC: SIGNIFICANT CHANGE UP
CREAT SERPL-MCNC: 0.93 MG/DL — SIGNIFICANT CHANGE UP (ref 0.5–1.3)
EOSINOPHIL # BLD AUTO: 0.03 K/UL — SIGNIFICANT CHANGE UP (ref 0–0.5)
EOSINOPHIL NFR BLD AUTO: 0 % — SIGNIFICANT CHANGE UP (ref 0–6)
EOSINOPHIL NFR FLD: 0 % — SIGNIFICANT CHANGE UP (ref 0–6)
GLUCOSE SERPL-MCNC: 101 MG/DL — HIGH (ref 70–99)
GLUCOSE UR-MCNC: NEGATIVE — SIGNIFICANT CHANGE UP
HAPTOGLOB SERPL-MCNC: 96 MG/DL — SIGNIFICANT CHANGE UP (ref 34–200)
HCT VFR BLD CALC: 26.9 % — LOW (ref 39–50)
HGB BLD-MCNC: 8.3 G/DL — LOW (ref 13–17)
IMM GRANULOCYTES NFR BLD AUTO: 0.9 % — SIGNIFICANT CHANGE UP (ref 0–1.5)
INR BLD: 1.52 — HIGH (ref 0.88–1.17)
KETONES UR-MCNC: NEGATIVE — SIGNIFICANT CHANGE UP
L PNEUMO AG UR QL: NEGATIVE — SIGNIFICANT CHANGE UP
LDH SERPL L TO P-CCNC: 405 U/L — HIGH (ref 135–225)
LEUKOCYTE ESTERASE UR-ACNC: NEGATIVE — SIGNIFICANT CHANGE UP
LYMPHOCYTES # BLD AUTO: 127.46 K/UL — HIGH (ref 1–3.3)
LYMPHOCYTES # BLD AUTO: 93 % — HIGH (ref 13–44)
LYMPHOCYTES NFR SPEC AUTO: 92 % — HIGH (ref 13–44)
MACROCYTES BLD QL: SLIGHT — SIGNIFICANT CHANGE UP
MAGNESIUM SERPL-MCNC: 1.4 MG/DL — LOW (ref 1.6–2.6)
MANUAL SMEAR VERIFICATION: SIGNIFICANT CHANGE UP
MCHC RBC-ENTMCNC: 30.9 % — LOW (ref 32–36)
MCHC RBC-ENTMCNC: 33.2 PG — SIGNIFICANT CHANGE UP (ref 27–34)
MCV RBC AUTO: 107.6 FL — HIGH (ref 80–100)
MONOCYTES # BLD AUTO: 1.63 K/UL — HIGH (ref 0–0.9)
MONOCYTES NFR BLD AUTO: 1.2 % — LOW (ref 2–14)
MONOCYTES NFR BLD: 0 % — LOW (ref 2–9)
MUCOUS THREADS # UR AUTO: SIGNIFICANT CHANGE UP
NEUTROPHIL AB SER-ACNC: 8 % — LOW (ref 43–77)
NEUTROPHILS # BLD AUTO: 6.28 K/UL — SIGNIFICANT CHANGE UP (ref 1.8–7.4)
NEUTROPHILS NFR BLD AUTO: 4.7 % — LOW (ref 43–77)
NITRITE UR-MCNC: NEGATIVE — SIGNIFICANT CHANGE UP
PH UR: 6 — SIGNIFICANT CHANGE UP (ref 4.6–8)
PHOSPHATE SERPL-MCNC: 3.4 MG/DL — SIGNIFICANT CHANGE UP (ref 2.5–4.5)
PLATELET # BLD AUTO: 49 K/UL — LOW (ref 150–400)
PLATELET COUNT - ESTIMATE: SIGNIFICANT CHANGE UP
PMV BLD: 9.8 FL — SIGNIFICANT CHANGE UP (ref 7–13)
POLYCHROMASIA BLD QL SMEAR: SLIGHT — SIGNIFICANT CHANGE UP
POTASSIUM SERPL-MCNC: 4 MMOL/L — SIGNIFICANT CHANGE UP (ref 3.5–5.3)
POTASSIUM SERPL-SCNC: 4 MMOL/L — SIGNIFICANT CHANGE UP (ref 3.5–5.3)
PROT SERPL-MCNC: 4.6 G/DL — LOW (ref 6–8.3)
PROT UR-MCNC: NEGATIVE — SIGNIFICANT CHANGE UP
PROTHROM AB SERPL-ACNC: 17.2 SEC — HIGH (ref 9.8–13.1)
RBC # BLD: 2.5 M/UL — LOW (ref 4.2–5.8)
RBC # FLD: 29.7 % — HIGH (ref 10.3–14.5)
RBC CASTS # UR COMP ASSIST: SIGNIFICANT CHANGE UP (ref 0–?)
SMUDGE CELLS # BLD: PRESENT — SIGNIFICANT CHANGE UP
SODIUM SERPL-SCNC: 143 MMOL/L — SIGNIFICANT CHANGE UP (ref 135–145)
SP GR SPEC: 1.01 — SIGNIFICANT CHANGE UP (ref 1–1.03)
SPECIMEN SOURCE: SIGNIFICANT CHANGE UP
SPECIMEN SOURCE: SIGNIFICANT CHANGE UP
SQUAMOUS # UR AUTO: SIGNIFICANT CHANGE UP
TROPONIN T SERPL-MCNC: < 0.06 NG/ML — SIGNIFICANT CHANGE UP (ref 0–0.06)
URATE SERPL-MCNC: 4.5 MG/DL — SIGNIFICANT CHANGE UP (ref 3.4–8.8)
UROBILINOGEN FLD QL: NORMAL E.U. — SIGNIFICANT CHANGE UP (ref 0.1–0.2)
WBC # BLD: 137.02 K/UL — CRITICAL HIGH (ref 3.8–10.5)
WBC # FLD AUTO: 137.02 K/UL — CRITICAL HIGH (ref 3.8–10.5)
WBC UR QL: SIGNIFICANT CHANGE UP (ref 0–?)

## 2017-05-02 PROCEDURE — 71250 CT THORAX DX C-: CPT | Mod: 26

## 2017-05-02 PROCEDURE — 99223 1ST HOSP IP/OBS HIGH 75: CPT | Mod: GC

## 2017-05-02 PROCEDURE — 93970 EXTREMITY STUDY: CPT | Mod: 26

## 2017-05-02 RX ORDER — FUROSEMIDE 40 MG
40 TABLET ORAL DAILY
Qty: 0 | Refills: 0 | Status: DISCONTINUED | OUTPATIENT
Start: 2017-05-02 | End: 2017-05-03

## 2017-05-02 RX ORDER — MAGNESIUM OXIDE 400 MG ORAL TABLET 241.3 MG
400 TABLET ORAL ONCE
Qty: 0 | Refills: 0 | Status: COMPLETED | OUTPATIENT
Start: 2017-05-02 | End: 2017-05-02

## 2017-05-02 RX ADMIN — Medication 3 MILLILITER(S): at 03:42

## 2017-05-02 RX ADMIN — FINASTERIDE 5 MILLIGRAM(S): 5 TABLET, FILM COATED ORAL at 12:02

## 2017-05-02 RX ADMIN — Medication 3 MILLILITER(S): at 21:35

## 2017-05-02 RX ADMIN — Medication 1 MILLIGRAM(S): at 12:02

## 2017-05-02 RX ADMIN — Medication 40 MILLIGRAM(S): at 11:58

## 2017-05-02 RX ADMIN — SODIUM CHLORIDE 4 MILLILITER(S): 9 INJECTION INTRAMUSCULAR; INTRAVENOUS; SUBCUTANEOUS at 03:42

## 2017-05-02 RX ADMIN — Medication 250 MILLIGRAM(S): at 06:22

## 2017-05-02 RX ADMIN — Medication 3 MILLILITER(S): at 09:56

## 2017-05-02 RX ADMIN — AMLODIPINE BESYLATE 10 MILLIGRAM(S): 2.5 TABLET ORAL at 06:21

## 2017-05-02 RX ADMIN — MAGNESIUM OXIDE 400 MG ORAL TABLET 400 MILLIGRAM(S): 241.3 TABLET ORAL at 17:08

## 2017-05-02 RX ADMIN — Medication 250 MILLIGRAM(S): at 17:09

## 2017-05-02 RX ADMIN — ATORVASTATIN CALCIUM 20 MILLIGRAM(S): 80 TABLET, FILM COATED ORAL at 21:52

## 2017-05-02 RX ADMIN — Medication 100 MICROGRAM(S): at 06:21

## 2017-05-02 RX ADMIN — Medication 300 MILLIGRAM(S): at 12:01

## 2017-05-02 RX ADMIN — PANTOPRAZOLE SODIUM 40 MILLIGRAM(S): 20 TABLET, DELAYED RELEASE ORAL at 06:20

## 2017-05-02 RX ADMIN — PIPERACILLIN AND TAZOBACTAM 25 GRAM(S): 4; .5 INJECTION, POWDER, LYOPHILIZED, FOR SOLUTION INTRAVENOUS at 09:00

## 2017-05-02 RX ADMIN — PIPERACILLIN AND TAZOBACTAM 25 GRAM(S): 4; .5 INJECTION, POWDER, LYOPHILIZED, FOR SOLUTION INTRAVENOUS at 18:30

## 2017-05-02 RX ADMIN — PIPERACILLIN AND TAZOBACTAM 25 GRAM(S): 4; .5 INJECTION, POWDER, LYOPHILIZED, FOR SOLUTION INTRAVENOUS at 22:51

## 2017-05-02 RX ADMIN — Medication 3 MILLILITER(S): at 15:17

## 2017-05-02 RX ADMIN — AZITHROMYCIN 250 MILLIGRAM(S): 500 TABLET, FILM COATED ORAL at 21:52

## 2017-05-02 RX ADMIN — PANTOPRAZOLE SODIUM 40 MILLIGRAM(S): 20 TABLET, DELAYED RELEASE ORAL at 17:09

## 2017-05-02 RX ADMIN — SODIUM CHLORIDE 4 MILLILITER(S): 9 INJECTION INTRAMUSCULAR; INTRAVENOUS; SUBCUTANEOUS at 15:18

## 2017-05-02 RX ADMIN — SODIUM CHLORIDE 4 MILLILITER(S): 9 INJECTION INTRAMUSCULAR; INTRAVENOUS; SUBCUTANEOUS at 09:56

## 2017-05-02 NOTE — PROVIDER CONTACT NOTE (OTHER) - RECOMMENDATIONS
MD Ramos notified, made aware of all instances.  Emotional support provided to patient and family.  Will follow, assess.

## 2017-05-02 NOTE — PROVIDER CONTACT NOTE (OTHER) - SITUATION
Patient noted with small formed stool contaminated with urine.  No diarrhea noted.  Attempted VBG, 1300hrs CKMB, tropnin labs ordered w/o success.

## 2017-05-03 LAB
ALBUMIN SERPL ELPH-MCNC: 3 G/DL — LOW (ref 3.3–5)
ALP SERPL-CCNC: 62 U/L — SIGNIFICANT CHANGE UP (ref 40–120)
ALT FLD-CCNC: 16 U/L — SIGNIFICANT CHANGE UP (ref 4–41)
AST SERPL-CCNC: 22 U/L — SIGNIFICANT CHANGE UP (ref 4–40)
BILIRUB SERPL-MCNC: 2.2 MG/DL — HIGH (ref 0.2–1.2)
BUN SERPL-MCNC: 20 MG/DL — SIGNIFICANT CHANGE UP (ref 7–23)
CALCIUM SERPL-MCNC: 7.1 MG/DL — LOW (ref 8.4–10.5)
CHLORIDE SERPL-SCNC: 101 MMOL/L — SIGNIFICANT CHANGE UP (ref 98–107)
CO2 SERPL-SCNC: 26 MMOL/L — SIGNIFICANT CHANGE UP (ref 22–31)
CREAT SERPL-MCNC: 0.99 MG/DL — SIGNIFICANT CHANGE UP (ref 0.5–1.3)
GLUCOSE SERPL-MCNC: 103 MG/DL — HIGH (ref 70–99)
HCT VFR BLD CALC: 23.4 % — LOW (ref 39–50)
HGB BLD-MCNC: 7.1 G/DL — LOW (ref 13–17)
MAGNESIUM SERPL-MCNC: 1.3 MG/DL — LOW (ref 1.6–2.6)
MCHC RBC-ENTMCNC: 30.3 % — LOW (ref 32–36)
MCHC RBC-ENTMCNC: 31.8 PG — SIGNIFICANT CHANGE UP (ref 27–34)
MCV RBC AUTO: 104.9 FL — HIGH (ref 80–100)
PHOSPHATE SERPL-MCNC: 3 MG/DL — SIGNIFICANT CHANGE UP (ref 2.5–4.5)
PLATELET # BLD AUTO: 41 K/UL — LOW (ref 150–400)
PMV BLD: 10 FL — SIGNIFICANT CHANGE UP (ref 7–13)
POTASSIUM SERPL-MCNC: 3.5 MMOL/L — SIGNIFICANT CHANGE UP (ref 3.5–5.3)
POTASSIUM SERPL-SCNC: 3.5 MMOL/L — SIGNIFICANT CHANGE UP (ref 3.5–5.3)
PROT SERPL-MCNC: 4.4 G/DL — LOW (ref 6–8.3)
RBC # BLD: 2.23 M/UL — LOW (ref 4.2–5.8)
RBC # FLD: 27.4 % — HIGH (ref 10.3–14.5)
SODIUM SERPL-SCNC: 140 MMOL/L — SIGNIFICANT CHANGE UP (ref 135–145)
SPECIMEN SOURCE: SIGNIFICANT CHANGE UP
VANCOMYCIN TROUGH SERPL-MCNC: 18.2 UG/ML — SIGNIFICANT CHANGE UP (ref 10–20)
WBC # BLD: 86.49 K/UL — CRITICAL HIGH (ref 3.8–10.5)
WBC # FLD AUTO: 86.49 K/UL — CRITICAL HIGH (ref 3.8–10.5)

## 2017-05-03 PROCEDURE — 93306 TTE W/DOPPLER COMPLETE: CPT | Mod: 26

## 2017-05-03 PROCEDURE — 99223 1ST HOSP IP/OBS HIGH 75: CPT

## 2017-05-03 PROCEDURE — 99231 SBSQ HOSP IP/OBS SF/LOW 25: CPT | Mod: GC

## 2017-05-03 PROCEDURE — 99232 SBSQ HOSP IP/OBS MODERATE 35: CPT | Mod: GC

## 2017-05-03 RX ORDER — MAGNESIUM OXIDE 400 MG ORAL TABLET 241.3 MG
400 TABLET ORAL
Qty: 0 | Refills: 0 | Status: COMPLETED | OUTPATIENT
Start: 2017-05-03 | End: 2017-05-03

## 2017-05-03 RX ADMIN — PANTOPRAZOLE SODIUM 40 MILLIGRAM(S): 20 TABLET, DELAYED RELEASE ORAL at 17:42

## 2017-05-03 RX ADMIN — Medication 250 MILLIGRAM(S): at 17:42

## 2017-05-03 RX ADMIN — PIPERACILLIN AND TAZOBACTAM 25 GRAM(S): 4; .5 INJECTION, POWDER, LYOPHILIZED, FOR SOLUTION INTRAVENOUS at 06:48

## 2017-05-03 RX ADMIN — MAGNESIUM OXIDE 400 MG ORAL TABLET 400 MILLIGRAM(S): 241.3 TABLET ORAL at 11:54

## 2017-05-03 RX ADMIN — Medication 3 MILLILITER(S): at 21:53

## 2017-05-03 RX ADMIN — ATORVASTATIN CALCIUM 20 MILLIGRAM(S): 80 TABLET, FILM COATED ORAL at 21:38

## 2017-05-03 RX ADMIN — Medication 1 MILLIGRAM(S): at 11:54

## 2017-05-03 RX ADMIN — FINASTERIDE 5 MILLIGRAM(S): 5 TABLET, FILM COATED ORAL at 11:54

## 2017-05-03 RX ADMIN — Medication 250 MILLIGRAM(S): at 05:48

## 2017-05-03 RX ADMIN — Medication 3 MILLILITER(S): at 03:10

## 2017-05-03 RX ADMIN — MAGNESIUM OXIDE 400 MG ORAL TABLET 400 MILLIGRAM(S): 241.3 TABLET ORAL at 17:42

## 2017-05-03 RX ADMIN — PIPERACILLIN AND TAZOBACTAM 25 GRAM(S): 4; .5 INJECTION, POWDER, LYOPHILIZED, FOR SOLUTION INTRAVENOUS at 21:38

## 2017-05-03 RX ADMIN — AMLODIPINE BESYLATE 10 MILLIGRAM(S): 2.5 TABLET ORAL at 05:48

## 2017-05-03 RX ADMIN — PANTOPRAZOLE SODIUM 40 MILLIGRAM(S): 20 TABLET, DELAYED RELEASE ORAL at 05:48

## 2017-05-03 RX ADMIN — PIPERACILLIN AND TAZOBACTAM 25 GRAM(S): 4; .5 INJECTION, POWDER, LYOPHILIZED, FOR SOLUTION INTRAVENOUS at 13:36

## 2017-05-03 RX ADMIN — Medication 40 MILLIGRAM(S): at 05:48

## 2017-05-03 RX ADMIN — Medication 100 MICROGRAM(S): at 05:48

## 2017-05-03 RX ADMIN — Medication 300 MILLIGRAM(S): at 13:36

## 2017-05-03 RX ADMIN — Medication 3 MILLILITER(S): at 17:15

## 2017-05-04 ENCOUNTER — TRANSCRIPTION ENCOUNTER (OUTPATIENT)
Age: 82
End: 2017-05-04

## 2017-05-04 LAB
-  AMIKACIN: SIGNIFICANT CHANGE UP
-  AMPICILLIN/SULBACTAM: SIGNIFICANT CHANGE UP
-  AMPICILLIN: SIGNIFICANT CHANGE UP
-  AZTREONAM: SIGNIFICANT CHANGE UP
-  CEFAZOLIN: SIGNIFICANT CHANGE UP
-  CEFEPIME: SIGNIFICANT CHANGE UP
-  CEFOXITIN: SIGNIFICANT CHANGE UP
-  CEFTAZIDIME: SIGNIFICANT CHANGE UP
-  CEFTRIAXONE: SIGNIFICANT CHANGE UP
-  CIPROFLOXACIN: SIGNIFICANT CHANGE UP
-  ERTAPENEM: SIGNIFICANT CHANGE UP
-  GENTAMICIN: SIGNIFICANT CHANGE UP
-  IMIPENEM: SIGNIFICANT CHANGE UP
-  LEVOFLOXACIN: SIGNIFICANT CHANGE UP
-  MEROPENEM: SIGNIFICANT CHANGE UP
-  NITROFURANTOIN: SIGNIFICANT CHANGE UP
-  PIPERACILLIN/TAZOBACTAM: SIGNIFICANT CHANGE UP
-  TIGECYCLINE: SIGNIFICANT CHANGE UP
-  TOBRAMYCIN: SIGNIFICANT CHANGE UP
-  TRIMETHOPRIM/SULFAMETHOXAZOLE: SIGNIFICANT CHANGE UP
BACTERIA UR CULT: SIGNIFICANT CHANGE UP
BILIRUB DIRECT SERPL-MCNC: 0.3 MG/DL — HIGH (ref 0.1–0.2)
BILIRUB SERPL-MCNC: 1.5 MG/DL — HIGH (ref 0.2–1.2)
BUN SERPL-MCNC: 20 MG/DL — SIGNIFICANT CHANGE UP (ref 7–23)
CALCIUM SERPL-MCNC: 7.2 MG/DL — LOW (ref 8.4–10.5)
CHLORIDE SERPL-SCNC: 101 MMOL/L — SIGNIFICANT CHANGE UP (ref 98–107)
CO2 SERPL-SCNC: 23 MMOL/L — SIGNIFICANT CHANGE UP (ref 22–31)
CREAT SERPL-MCNC: 1.12 MG/DL — SIGNIFICANT CHANGE UP (ref 0.5–1.3)
GLUCOSE SERPL-MCNC: 111 MG/DL — HIGH (ref 70–99)
HAPTOGLOB SERPL-MCNC: 147 MG/DL — SIGNIFICANT CHANGE UP (ref 34–200)
LDH SERPL L TO P-CCNC: 598 U/L — HIGH (ref 135–225)
MAGNESIUM SERPL-MCNC: 1.5 MG/DL — LOW (ref 1.6–2.6)
METHOD TYPE: SIGNIFICANT CHANGE UP
ORGANISM # SPEC MICROSCOPIC CNT: SIGNIFICANT CHANGE UP
ORGANISM # SPEC MICROSCOPIC CNT: SIGNIFICANT CHANGE UP
PHOSPHATE SERPL-MCNC: 3 MG/DL — SIGNIFICANT CHANGE UP (ref 2.5–4.5)
POTASSIUM SERPL-MCNC: 4.6 MMOL/L — SIGNIFICANT CHANGE UP (ref 3.5–5.3)
POTASSIUM SERPL-SCNC: 4.6 MMOL/L — SIGNIFICANT CHANGE UP (ref 3.5–5.3)
SODIUM SERPL-SCNC: 138 MMOL/L — SIGNIFICANT CHANGE UP (ref 135–145)
URATE SERPL-MCNC: 3.9 MG/DL — SIGNIFICANT CHANGE UP (ref 3.4–8.8)

## 2017-05-04 PROCEDURE — 99233 SBSQ HOSP IP/OBS HIGH 50: CPT | Mod: GC

## 2017-05-04 RX ORDER — MAGNESIUM OXIDE 400 MG ORAL TABLET 241.3 MG
400 TABLET ORAL
Qty: 0 | Refills: 0 | Status: COMPLETED | OUTPATIENT
Start: 2017-05-04 | End: 2017-05-06

## 2017-05-04 RX ADMIN — MAGNESIUM OXIDE 400 MG ORAL TABLET 400 MILLIGRAM(S): 241.3 TABLET ORAL at 12:23

## 2017-05-04 RX ADMIN — Medication 1 MILLIGRAM(S): at 12:23

## 2017-05-04 RX ADMIN — Medication 250 MILLIGRAM(S): at 18:09

## 2017-05-04 RX ADMIN — PIPERACILLIN AND TAZOBACTAM 25 GRAM(S): 4; .5 INJECTION, POWDER, LYOPHILIZED, FOR SOLUTION INTRAVENOUS at 13:25

## 2017-05-04 RX ADMIN — Medication 3 MILLILITER(S): at 15:54

## 2017-05-04 RX ADMIN — ATORVASTATIN CALCIUM 20 MILLIGRAM(S): 80 TABLET, FILM COATED ORAL at 22:30

## 2017-05-04 RX ADMIN — Medication 250 MILLIGRAM(S): at 05:02

## 2017-05-04 RX ADMIN — PIPERACILLIN AND TAZOBACTAM 25 GRAM(S): 4; .5 INJECTION, POWDER, LYOPHILIZED, FOR SOLUTION INTRAVENOUS at 06:23

## 2017-05-04 RX ADMIN — PIPERACILLIN AND TAZOBACTAM 25 GRAM(S): 4; .5 INJECTION, POWDER, LYOPHILIZED, FOR SOLUTION INTRAVENOUS at 22:31

## 2017-05-04 RX ADMIN — FINASTERIDE 5 MILLIGRAM(S): 5 TABLET, FILM COATED ORAL at 12:23

## 2017-05-04 RX ADMIN — MAGNESIUM OXIDE 400 MG ORAL TABLET 400 MILLIGRAM(S): 241.3 TABLET ORAL at 18:09

## 2017-05-04 RX ADMIN — Medication 100 MICROGRAM(S): at 05:02

## 2017-05-04 RX ADMIN — Medication 3 MILLILITER(S): at 03:20

## 2017-05-04 RX ADMIN — Medication 3 MILLILITER(S): at 09:42

## 2017-05-04 RX ADMIN — PANTOPRAZOLE SODIUM 40 MILLIGRAM(S): 20 TABLET, DELAYED RELEASE ORAL at 05:12

## 2017-05-04 RX ADMIN — AMLODIPINE BESYLATE 10 MILLIGRAM(S): 2.5 TABLET ORAL at 05:02

## 2017-05-04 RX ADMIN — Medication 300 MILLIGRAM(S): at 12:23

## 2017-05-04 RX ADMIN — Medication 3 MILLILITER(S): at 21:44

## 2017-05-04 RX ADMIN — PANTOPRAZOLE SODIUM 40 MILLIGRAM(S): 20 TABLET, DELAYED RELEASE ORAL at 18:09

## 2017-05-04 NOTE — PHYSICAL THERAPY INITIAL EVALUATION ADULT - PATIENT PROFILE REVIEW, REHAB EVAL
yes/ACTIVITY: ambulate with assistance. Spoke with IRMA Reid prior to PT initial evaluation --> Pt OK for dangling at bedside.

## 2017-05-04 NOTE — PHYSICAL THERAPY INITIAL EVALUATION ADULT - PERTINENT HX OF CURRENT PROBLEM, REHAB EVAL
Pt 86 M with a PMHx of CLL (last chemo 4/25 done inpatient at [facility]), autoimmune hemolytic anemia, hypothyroidism, BPH, and HTN that presents to the [facility] ED after having worsening shortness of breath and diarrhea.

## 2017-05-04 NOTE — DISCHARGE NOTE ADULT - CARE PROVIDER_API CALL
Lux Mathew), Hematology; Internal Medicine; Medical Oncology  [address] [place] [address]  Phone: [phone]  Fax: [phone]

## 2017-05-04 NOTE — DISCHARGE NOTE ADULT - MEDICATION SUMMARY - MEDICATIONS TO TAKE
I will START or STAY ON the medications listed below when I get home from the hospital:    finasteride 5 mg oral tablet  -- 1 tab(s) by mouth once a day  -- Indication: For BPH (benign prostatic hyperplasia)    allopurinol 300 mg oral tablet  -- 1 tab(s) by mouth once a day  -- Indication: For antigout    Lipitor 20 mg oral tablet  -- 1 tab(s) by mouth once a day (at bedtime)  -- Indication: For HLD    Stiolto Respimat 2.5 mcg-2.5 mcg inhalation aerosol  -- 2 puff(s) inhaled every 24 hours  -- Indication: For COPD    Norvasc 10 mg oral tablet  -- 1 tab(s) by mouth once a day  -- Indication: For HTN    furosemide 40 mg oral tablet  -- 1 tab(s) by mouth once a day  -- Indication: For HTN    pantoprazole 40 mg oral delayed release tablet  -- 1 tab(s) by mouth 2 times a day (before meals)  -- Indication: For GI PPX    Synthroid 100 mcg (0.1 mg) oral tablet  -- 1 tab(s) by mouth once a day  -- Indication: For Hypothyroidism    folic acid 1 mg oral tablet  -- 1 tab(s) by mouth once a day  -- Indication: For Supplement I will START or STAY ON the medications listed below when I get home from the hospital:    finasteride 5 mg oral tablet  -- 1 tab(s) by mouth once a day  -- Indication: For BPH (benign prostatic hyperplasia)    allopurinol 300 mg oral tablet  -- 1 tab(s) by mouth once a day  -- Indication: For antigout    Lipitor 20 mg oral tablet  -- 1 tab(s) by mouth once a day (at bedtime)  -- Indication: For HLD    Stiolto Respimat 2.5 mcg-2.5 mcg inhalation aerosol  -- 2 puff(s) inhaled every 24 hours  -- Indication: For COPD    Norvasc 10 mg oral tablet  -- 1 tab(s) by mouth once a day  -- Indication: For HTN    furosemide 40 mg oral tablet  -- 1 tab(s) by mouth once a day  -- Indication: For HTN    pantoprazole 40 mg oral delayed release tablet  -- 1 tab(s) by mouth once a day  -- Indication: For GI PPX    Synthroid 100 mcg (0.1 mg) oral tablet  -- 1 tab(s) by mouth once a day  -- Indication: For Hypothyroidism    folic acid 1 mg oral tablet  -- 1 tab(s) by mouth once a day  -- Indication: For Supplement I will START or STAY ON the medications listed below when I get home from the hospital:    finasteride 5 mg oral tablet  -- 1 tab(s) by mouth once a day  -- Indication: For BPH (benign prostatic hyperplasia)    allopurinol 300 mg oral tablet  -- 1 tab(s) by mouth once a day  -- Indication: For antigout    Lipitor 20 mg oral tablet  -- 1 tab(s) by mouth once a day (at bedtime)  -- Indication: For HLD    Stiolto Respimat 2.5 mcg-2.5 mcg inhalation aerosol  -- 2 puff(s) inhaled every 24 hours  -- Indication: For COPD    Ventolin HFA 90 mcg/inh inhalation aerosol  -- 1 puff(s) inhaled every 4 hours, As Needed -for shortness of breath and/or wheezing  -- For inhalation only.  It is very important that you take or use this exactly as directed.  Do not skip doses or discontinue unless directed by your doctor.  Obtain medical advice before taking any non-prescription drugs as some may affect the action of this medication.  Shake well before use.    -- Indication: For COPD    Norvasc 10 mg oral tablet  -- 1 tab(s) by mouth once a day  -- Indication: For HTN    furosemide 40 mg oral tablet  -- 1 tab(s) by mouth once a day  -- Indication: For HTN    pantoprazole 40 mg oral delayed release tablet  -- 1 tab(s) by mouth once a day  -- Indication: For GI PPX    Synthroid 100 mcg (0.1 mg) oral tablet  -- 1 tab(s) by mouth once a day  -- Indication: For Hypothyroidism    folic acid 1 mg oral tablet  -- 1 tab(s) by mouth once a day  -- Indication: For Supplement

## 2017-05-04 NOTE — PHYSICAL THERAPY INITIAL EVALUATION ADULT - DIAGNOSIS, PT EVAL
Patient admitted to hospital for worsening shortness of breath and diarrhea; presents with decreased strength and decreased balance.

## 2017-05-04 NOTE — DISCHARGE NOTE ADULT - DURABLE MEDICAL EQUIPMENT AGENCY
[facility] [phone]: For Home Oxygen: Nasal O2 2L/min. The Concentrator & Home Fill will be delivered to your home this Morning. A  Portable Oxygen Tank will be delivered to your Hospital Room for Transport Home.

## 2017-05-04 NOTE — DISCHARGE NOTE ADULT - HOSPITAL COURSE
Dx: SOB  HPI: Pt 86 M with a PMHx of CLL (last chemo 4/25 done inpatient at [facility]), autoimmune hemolytic anemia, hypothyroidism, BPH, and HTN that presents to the [facility] ED after having worsening shortness of breath and diarrhea. He was recently discharged from [facility] where he was admitted for CLL and received Chemo on that admission. After returning home he states that he was having worsening shortness of breath.    Hospital Course: Dx: SOB  HPI: Pt 86 M with a PMHx of CLL (last chemo 4/25 done inpatient at [facility]), autoimmune hemolytic anemia, hypothyroidism, BPH, and HTN that presents to the [facility] ED after having worsening shortness of breath and diarrhea. He was recently discharged from [facility] where he was admitted for CLL and received Chemo on that admission. After returning home he states that he was having worsening shortness of breath.    Hospital Course:    CLL:  - Lasix  - I & Os  - chest CT-Leftf pleural effusion, diffuse tree in bud nodules- infection? atelectasis  - Duplex LE-no DVT  - Bipap  -heme cs-  -echo- normal LV, normal RV, mild to mod AR    HCAP:  -CT chest-  Left lower lobe consolidation representing passive atelectasis with or without superimposed pneumonia. Diffuse tree-in-bud nodules offer further evidence of the presence of infection. Small bilateral pleural effusions, larger on the left. Stable adenopathy in the chest, consistent with known CLL.  - ID cs  - ABX-Abx: vanc (5/1-), zosyn (5/1-), azithromycin (5/1-)/ d/c on 5/8  - C.diff- neg  -BCx- neg    Dispo: home with home PT Dx: SOB  HPI: Pt 86 M with a PMHx of CLL (last chemo 4/25 done inpatient at [facility]), autoimmune hemolytic anemia, hypothyroidism, BPH, and HTN that presents to the [facility] ED after having worsening shortness of breath and diarrhea. He was recently discharged from [facility] where he was admitted for CLL and received Chemo on that admission. After returning home he states that he was having worsening shortness of breath.    Hospital Course:    CLL:  - Lasix  - I & Os  - chest CT-Leftf pleural effusion, diffuse tree in bud nodules- infection? atelectasis  - Duplex LE-no DVT  - Bipap  -heme cs-  -echo- normal LV, normal RV, mild to mod AR    HCAP:  -CT chest-  Left lower lobe consolidation representing passive atelectasis with or without superimposed pneumonia. Diffuse tree-in-bud nodules offer further evidence of the presence of infection. Small bilateral pleural effusions, larger on the left. Stable adenopathy in the chest, consistent with known CLL.  - ID cs  - ABX-Abx: vanc (5/1-), zosyn (5/1-), azithromycin (5/1-)/ d/c on 5/8  - C.diff- neg  -BCx- neg    Dispo: home with home PT  Patient refused rehab.   He will need to have a followup CT chest in 6 weeks to evaluate for resolution

## 2017-05-04 NOTE — DISCHARGE NOTE ADULT - PATIENT PORTAL LINK FT
“You can access the [facility] Patient Portal, offered by [facility], by registering with the following website: [email or web address]”

## 2017-05-04 NOTE — DISCHARGE NOTE ADULT - SECONDARY DIAGNOSIS.
Fall, initial encounter Metabolic encephalopathy CLL (chronic lymphocytic leukemia) Essential hypertension Hypothyroidism

## 2017-05-04 NOTE — DISCHARGE NOTE ADULT - CARE PLAN
Principal Discharge DX:	Pneumonia  Goal:	improving  Instructions for follow-up, activity and diet:	You received IV antibiotics while you were in hospital with improvement. Take full course of antibiotics as prescribed.  Secondary Diagnosis:	Fall, initial encounter  Goal:	remain free of falls  Instructions for follow-up, activity and diet:	Physical therapy recommended PT. Fall precautions, ambulate with assistance.  Secondary Diagnosis:	Metabolic encephalopathy  Goal:	resolved  Secondary Diagnosis:	CLL (chronic lymphocytic leukemia)  Goal:	stable  Instructions for follow-up, activity and diet:	Follow up with hematologist outpatient, call for appointment  Secondary Diagnosis:	Essential hypertension  Instructions for follow-up, activity and diet:	continue with norvasc  Secondary Diagnosis:	Hypothyroidism  Instructions for follow-up, activity and diet:	continue with synthroid Principal Discharge DX:	Pneumonia  Goal:	improving  Instructions for follow-up, activity and diet:	You received IV antibiotics while you were in hospital with improvement. Take full course of antibiotics as prescribed.  Secondary Diagnosis:	Fall, initial encounter  Goal:	remain free of falls  Instructions for follow-up, activity and diet:	Home with home PT. Fall precautions, ambulate with assistance.  Secondary Diagnosis:	Metabolic encephalopathy  Goal:	resolved  Secondary Diagnosis:	CLL (chronic lymphocytic leukemia)  Goal:	stable  Instructions for follow-up, activity and diet:	Follow up with hematologist outpatient, call for appointment  Secondary Diagnosis:	Essential hypertension  Instructions for follow-up, activity and diet:	continue with norvasc  Secondary Diagnosis:	Hypothyroidism  Instructions for follow-up, activity and diet:	continue with synthroid Principal Discharge DX:	Pneumonia  Goal:	improving  Instructions for follow-up, activity and diet:	You received IV antibiotics while you were in hospital with improvement. You finished your antibiotics while in the hospital.  Secondary Diagnosis:	Fall, initial encounter  Goal:	remain free of falls  Instructions for follow-up, activity and diet:	Home with home PT. Fall precautions, ambulate with assistance.  Secondary Diagnosis:	CLL (chronic lymphocytic leukemia)  Goal:	stable  Instructions for follow-up, activity and diet:	Follow up with hematologist outpatient, call for appointment  Secondary Diagnosis:	Essential hypertension  Instructions for follow-up, activity and diet:	Continue with medications as directed.  Secondary Diagnosis:	Hypothyroidism  Instructions for follow-up, activity and diet:	Continue with medications as directed.

## 2017-05-04 NOTE — PHYSICAL THERAPY INITIAL EVALUATION ADULT - ADDITIONAL COMMENTS
Patient reports that he lives with wife and daughter in a house with ~4-5 steps to negotiate to enter the house and bedroom on first floor.    Prior to hospital admission, patient ambulated independently and used cane or rolling walker. Patient required assistance from wife for ADL's. Patient had a recent mechanical fall caused by attempting to ambulate to bathroom without assistive device.      Patient was left comfortably in a semisupine position, NAD, all lines in tact, O2 maintained at, all precautions maintained, call bell in reach, family at bedside and RN Jeanette made aware of PT initial evaluation.

## 2017-05-04 NOTE — PHYSICAL THERAPY INITIAL EVALUATION ADULT - GENERAL OBSERVATIONS, REHAB EVAL
Patient encountered in semisupine position, no distress, AxOx4, with +IV and +nasal canula O2 @ 2.5L/min, wife and daughter at bedside.

## 2017-05-04 NOTE — PHYSICAL THERAPY INITIAL EVALUATION ADULT - CRITERIA FOR SKILLED THERAPEUTIC INTERVENTIONS
impairments found/functional limitations in following categories/risk reduction/prevention/rehab potential/therapy frequency

## 2017-05-04 NOTE — DISCHARGE NOTE ADULT - HOME CARE AGENCY
Damian/[facility] [phone] for Visiting Nurse Services & Home Physical Therapy. The 1st Visiting Nurse Visit is for Wednesday 5/10/17.The Nurse will call to arrange the Visit time.

## 2017-05-04 NOTE — DISCHARGE NOTE ADULT - PLAN OF CARE
improving You received IV antibiotics while you were in hospital with improvement. Take full course of antibiotics as prescribed. remain free of falls Physical therapy recommended PT. Fall precautions, ambulate with assistance. resolved stable Follow up with hematologist outpatient, call for appointment continue with norvasc continue with synthroid Home with home PT. Fall precautions, ambulate with assistance. You received IV antibiotics while you were in hospital with improvement. You finished your antibiotics while in the hospital. Continue with medications as directed.

## 2017-05-05 LAB
ANISOCYTOSIS BLD QL: SLIGHT — SIGNIFICANT CHANGE UP
BASOPHILS # BLD AUTO: 0.17 K/UL — SIGNIFICANT CHANGE UP (ref 0–0.2)
BASOPHILS NFR BLD AUTO: 0.2 % — SIGNIFICANT CHANGE UP (ref 0–2)
BASOPHILS NFR SPEC: 0.8 % — SIGNIFICANT CHANGE UP (ref 0–2)
BILIRUB DIRECT SERPL-MCNC: 0.5 MG/DL — HIGH (ref 0.1–0.2)
BILIRUB SERPL-MCNC: 1.6 MG/DL — HIGH (ref 0.2–1.2)
BLASTS # FLD: 0 % — SIGNIFICANT CHANGE UP (ref 0–0)
BUN SERPL-MCNC: 15 MG/DL — SIGNIFICANT CHANGE UP (ref 7–23)
CALCIUM SERPL-MCNC: 7.5 MG/DL — LOW (ref 8.4–10.5)
CHLORIDE SERPL-SCNC: 103 MMOL/L — SIGNIFICANT CHANGE UP (ref 98–107)
CO2 SERPL-SCNC: 28 MMOL/L — SIGNIFICANT CHANGE UP (ref 22–31)
CREAT SERPL-MCNC: 1.08 MG/DL — SIGNIFICANT CHANGE UP (ref 0.5–1.3)
EOSINOPHIL # BLD AUTO: 0.18 K/UL — SIGNIFICANT CHANGE UP (ref 0–0.5)
EOSINOPHIL NFR BLD AUTO: 0.2 % — SIGNIFICANT CHANGE UP (ref 0–6)
EOSINOPHIL NFR FLD: 0 % — SIGNIFICANT CHANGE UP (ref 0–6)
GLUCOSE SERPL-MCNC: 111 MG/DL — HIGH (ref 70–99)
HAPTOGLOB SERPL-MCNC: 154 MG/DL — SIGNIFICANT CHANGE UP (ref 34–200)
HCT VFR BLD CALC: 24.9 % — LOW (ref 39–50)
HGB BLD-MCNC: 7.4 G/DL — LOW (ref 13–17)
IMM GRANULOCYTES NFR BLD AUTO: 0.1 % — SIGNIFICANT CHANGE UP (ref 0–1.5)
LDH SERPL L TO P-CCNC: 314 U/L — HIGH (ref 135–225)
LYMPHOCYTES # BLD AUTO: 103.68 K/UL — HIGH (ref 1–3.3)
LYMPHOCYTES # BLD AUTO: 96.4 % — HIGH (ref 13–44)
LYMPHOCYTES NFR SPEC AUTO: 95.8 % — HIGH (ref 13–44)
MACROCYTES BLD QL: SLIGHT — SIGNIFICANT CHANGE UP
MAGNESIUM SERPL-MCNC: 1.4 MG/DL — LOW (ref 1.6–2.6)
MCHC RBC-ENTMCNC: 29.7 % — LOW (ref 32–36)
MCHC RBC-ENTMCNC: 31.1 PG — SIGNIFICANT CHANGE UP (ref 27–34)
MCV RBC AUTO: 104.6 FL — HIGH (ref 80–100)
METAMYELOCYTES # FLD: 0 % — SIGNIFICANT CHANGE UP (ref 0–1)
MICROCYTES BLD QL: SLIGHT — SIGNIFICANT CHANGE UP
MONOCYTES # BLD AUTO: 1.43 K/UL — HIGH (ref 0–0.9)
MONOCYTES NFR BLD AUTO: 1.3 % — LOW (ref 2–14)
MONOCYTES NFR BLD: 0 % — LOW (ref 2–9)
MYELOCYTES NFR BLD: 0 % — SIGNIFICANT CHANGE UP (ref 0–0)
NEUTROPHIL AB SER-ACNC: 1.7 % — LOW (ref 43–77)
NEUTROPHILS # BLD AUTO: 2.02 K/UL — SIGNIFICANT CHANGE UP (ref 1.8–7.4)
NEUTROPHILS NFR BLD AUTO: 1.8 % — LOW (ref 43–77)
NEUTS BAND # BLD: 0 % — SIGNIFICANT CHANGE UP (ref 0–6)
OTHER - HEMATOLOGY %: 0 — SIGNIFICANT CHANGE UP
OVALOCYTES BLD QL SMEAR: SLIGHT — SIGNIFICANT CHANGE UP
PHOSPHATE SERPL-MCNC: 2.4 MG/DL — LOW (ref 2.5–4.5)
PLATELET # BLD AUTO: 51 K/UL — LOW (ref 150–400)
PLATELET COUNT - ESTIMATE: SIGNIFICANT CHANGE UP
PMV BLD: 10.4 FL — SIGNIFICANT CHANGE UP (ref 7–13)
POIKILOCYTOSIS BLD QL AUTO: SLIGHT — SIGNIFICANT CHANGE UP
POTASSIUM SERPL-MCNC: 3.4 MMOL/L — LOW (ref 3.5–5.3)
POTASSIUM SERPL-SCNC: 3.4 MMOL/L — LOW (ref 3.5–5.3)
PROMYELOCYTES # FLD: 0 % — SIGNIFICANT CHANGE UP (ref 0–0)
RBC # BLD: 2.38 M/UL — LOW (ref 4.2–5.8)
RBC # FLD: 26.3 % — HIGH (ref 10.3–14.5)
SODIUM SERPL-SCNC: 144 MMOL/L — SIGNIFICANT CHANGE UP (ref 135–145)
URATE SERPL-MCNC: 3.4 MG/DL — SIGNIFICANT CHANGE UP (ref 3.4–8.8)
VARIANT LYMPHS # BLD: 1.7 % — SIGNIFICANT CHANGE UP
WBC # BLD: 107.57 K/UL — CRITICAL HIGH (ref 3.8–10.5)
WBC # FLD AUTO: 107.57 K/UL — CRITICAL HIGH (ref 3.8–10.5)

## 2017-05-05 PROCEDURE — 99233 SBSQ HOSP IP/OBS HIGH 50: CPT | Mod: GC

## 2017-05-05 RX ORDER — SODIUM,POTASSIUM PHOSPHATES 278-250MG
1 POWDER IN PACKET (EA) ORAL
Qty: 0 | Refills: 0 | Status: COMPLETED | OUTPATIENT
Start: 2017-05-05 | End: 2017-05-07

## 2017-05-05 RX ORDER — POTASSIUM CHLORIDE 20 MEQ
40 PACKET (EA) ORAL ONCE
Qty: 0 | Refills: 0 | Status: COMPLETED | OUTPATIENT
Start: 2017-05-05 | End: 2017-05-05

## 2017-05-05 RX ADMIN — PIPERACILLIN AND TAZOBACTAM 25 GRAM(S): 4; .5 INJECTION, POWDER, LYOPHILIZED, FOR SOLUTION INTRAVENOUS at 22:35

## 2017-05-05 RX ADMIN — MAGNESIUM OXIDE 400 MG ORAL TABLET 400 MILLIGRAM(S): 241.3 TABLET ORAL at 18:36

## 2017-05-05 RX ADMIN — Medication 1 TABLET(S): at 18:36

## 2017-05-05 RX ADMIN — ATORVASTATIN CALCIUM 20 MILLIGRAM(S): 80 TABLET, FILM COATED ORAL at 22:34

## 2017-05-05 RX ADMIN — MAGNESIUM OXIDE 400 MG ORAL TABLET 400 MILLIGRAM(S): 241.3 TABLET ORAL at 12:20

## 2017-05-05 RX ADMIN — Medication 1 TABLET(S): at 12:20

## 2017-05-05 RX ADMIN — Medication 300 MILLIGRAM(S): at 12:20

## 2017-05-05 RX ADMIN — Medication 100 MICROGRAM(S): at 06:38

## 2017-05-05 RX ADMIN — Medication 250 MILLIGRAM(S): at 18:36

## 2017-05-05 RX ADMIN — AMLODIPINE BESYLATE 10 MILLIGRAM(S): 2.5 TABLET ORAL at 06:38

## 2017-05-05 RX ADMIN — Medication 250 MILLIGRAM(S): at 06:38

## 2017-05-05 RX ADMIN — Medication 40 MILLIEQUIVALENT(S): at 09:44

## 2017-05-05 RX ADMIN — Medication 3 MILLILITER(S): at 03:40

## 2017-05-05 RX ADMIN — PIPERACILLIN AND TAZOBACTAM 25 GRAM(S): 4; .5 INJECTION, POWDER, LYOPHILIZED, FOR SOLUTION INTRAVENOUS at 15:02

## 2017-05-05 RX ADMIN — Medication 3 MILLILITER(S): at 10:18

## 2017-05-05 RX ADMIN — Medication 3 MILLILITER(S): at 22:18

## 2017-05-05 RX ADMIN — MAGNESIUM OXIDE 400 MG ORAL TABLET 400 MILLIGRAM(S): 241.3 TABLET ORAL at 09:44

## 2017-05-05 RX ADMIN — Medication 1 MILLIGRAM(S): at 12:20

## 2017-05-05 RX ADMIN — Medication 1 TABLET(S): at 22:34

## 2017-05-05 RX ADMIN — Medication 3 MILLILITER(S): at 16:19

## 2017-05-05 RX ADMIN — PANTOPRAZOLE SODIUM 40 MILLIGRAM(S): 20 TABLET, DELAYED RELEASE ORAL at 18:36

## 2017-05-05 RX ADMIN — PANTOPRAZOLE SODIUM 40 MILLIGRAM(S): 20 TABLET, DELAYED RELEASE ORAL at 06:38

## 2017-05-05 RX ADMIN — FINASTERIDE 5 MILLIGRAM(S): 5 TABLET, FILM COATED ORAL at 12:20

## 2017-05-05 RX ADMIN — PIPERACILLIN AND TAZOBACTAM 25 GRAM(S): 4; .5 INJECTION, POWDER, LYOPHILIZED, FOR SOLUTION INTRAVENOUS at 06:38

## 2017-05-06 LAB
ALBUMIN SERPL ELPH-MCNC: 3.3 G/DL — SIGNIFICANT CHANGE UP (ref 3.3–5)
ALP SERPL-CCNC: 72 U/L — SIGNIFICANT CHANGE UP (ref 40–120)
ALT FLD-CCNC: 14 U/L — SIGNIFICANT CHANGE UP (ref 4–41)
AST SERPL-CCNC: 20 U/L — SIGNIFICANT CHANGE UP (ref 4–40)
BACTERIA BLD CULT: SIGNIFICANT CHANGE UP
BACTERIA BLD CULT: SIGNIFICANT CHANGE UP
BASOPHILS # BLD AUTO: 0.15 K/UL — SIGNIFICANT CHANGE UP (ref 0–0.2)
BASOPHILS NFR BLD AUTO: 0.2 % — SIGNIFICANT CHANGE UP (ref 0–2)
BILIRUB SERPL-MCNC: 1.7 MG/DL — HIGH (ref 0.2–1.2)
BUN SERPL-MCNC: 13 MG/DL — SIGNIFICANT CHANGE UP (ref 7–23)
CALCIUM SERPL-MCNC: 7.6 MG/DL — LOW (ref 8.4–10.5)
CHLORIDE SERPL-SCNC: 103 MMOL/L — SIGNIFICANT CHANGE UP (ref 98–107)
CO2 SERPL-SCNC: 28 MMOL/L — SIGNIFICANT CHANGE UP (ref 22–31)
CREAT SERPL-MCNC: 1.01 MG/DL — SIGNIFICANT CHANGE UP (ref 0.5–1.3)
EOSINOPHIL # BLD AUTO: 0.2 K/UL — SIGNIFICANT CHANGE UP (ref 0–0.5)
EOSINOPHIL NFR BLD AUTO: 0.2 % — SIGNIFICANT CHANGE UP (ref 0–6)
GLUCOSE SERPL-MCNC: 103 MG/DL — HIGH (ref 70–99)
HCT VFR BLD CALC: 22.6 % — LOW (ref 39–50)
HGB BLD-MCNC: 7.1 G/DL — LOW (ref 13–17)
IMM GRANULOCYTES NFR BLD AUTO: 0.2 % — SIGNIFICANT CHANGE UP (ref 0–1.5)
LDH SERPL L TO P-CCNC: 323 U/L — HIGH (ref 135–225)
LYMPHOCYTES # BLD AUTO: 96.28 K/UL — HIGH (ref 1–3.3)
LYMPHOCYTES # BLD AUTO: 96.5 % — HIGH (ref 13–44)
MAGNESIUM SERPL-MCNC: 1.4 MG/DL — LOW (ref 1.6–2.6)
MANUAL SMEAR VERIFICATION: SIGNIFICANT CHANGE UP
MCHC RBC-ENTMCNC: 31.4 % — LOW (ref 32–36)
MCHC RBC-ENTMCNC: 32.4 PG — SIGNIFICANT CHANGE UP (ref 27–34)
MCV RBC AUTO: 103.2 FL — HIGH (ref 80–100)
MONOCYTES # BLD AUTO: 1.08 K/UL — HIGH (ref 0–0.9)
MONOCYTES NFR BLD AUTO: 1.1 % — LOW (ref 2–14)
NEUTROPHILS # BLD AUTO: 1.89 K/UL — SIGNIFICANT CHANGE UP (ref 1.8–7.4)
NEUTROPHILS NFR BLD AUTO: 1.8 % — LOW (ref 43–77)
PHOSPHATE SERPL-MCNC: 2.8 MG/DL — SIGNIFICANT CHANGE UP (ref 2.5–4.5)
PLATELET # BLD AUTO: 57 K/UL — LOW (ref 150–400)
PMV BLD: 9.8 FL — SIGNIFICANT CHANGE UP (ref 7–13)
POTASSIUM SERPL-MCNC: 3.8 MMOL/L — SIGNIFICANT CHANGE UP (ref 3.5–5.3)
POTASSIUM SERPL-SCNC: 3.8 MMOL/L — SIGNIFICANT CHANGE UP (ref 3.5–5.3)
PROT SERPL-MCNC: 5.1 G/DL — LOW (ref 6–8.3)
RBC # BLD: 2.19 M/UL — LOW (ref 4.2–5.8)
RBC # FLD: 26.1 % — HIGH (ref 10.3–14.5)
SODIUM SERPL-SCNC: 144 MMOL/L — SIGNIFICANT CHANGE UP (ref 135–145)
URATE SERPL-MCNC: 2.9 MG/DL — LOW (ref 3.4–8.8)
VANCOMYCIN FLD-MCNC: 20.6 UG/ML — SIGNIFICANT CHANGE UP
VANCOMYCIN TROUGH SERPL-MCNC: 26 UG/ML — CRITICAL HIGH (ref 10–20)
WBC # BLD: 99.76 K/UL — CRITICAL HIGH (ref 3.8–10.5)
WBC # FLD AUTO: 99.76 K/UL — CRITICAL HIGH (ref 3.8–10.5)

## 2017-05-06 PROCEDURE — 99233 SBSQ HOSP IP/OBS HIGH 50: CPT | Mod: GC

## 2017-05-06 RX ORDER — FUROSEMIDE 40 MG
40 TABLET ORAL ONCE
Qty: 0 | Refills: 0 | Status: COMPLETED | OUTPATIENT
Start: 2017-05-06 | End: 2017-05-06

## 2017-05-06 RX ORDER — PANTOPRAZOLE SODIUM 20 MG/1
40 TABLET, DELAYED RELEASE ORAL
Qty: 0 | Refills: 0 | Status: DISCONTINUED | OUTPATIENT
Start: 2017-05-06 | End: 2017-05-09

## 2017-05-06 RX ADMIN — Medication 100 MICROGRAM(S): at 05:50

## 2017-05-06 RX ADMIN — Medication 1 TABLET(S): at 11:35

## 2017-05-06 RX ADMIN — Medication 40 MILLIGRAM(S): at 10:50

## 2017-05-06 RX ADMIN — Medication 3 MILLILITER(S): at 04:42

## 2017-05-06 RX ADMIN — ATORVASTATIN CALCIUM 20 MILLIGRAM(S): 80 TABLET, FILM COATED ORAL at 22:02

## 2017-05-06 RX ADMIN — AMLODIPINE BESYLATE 10 MILLIGRAM(S): 2.5 TABLET ORAL at 05:50

## 2017-05-06 RX ADMIN — Medication 1 TABLET(S): at 16:27

## 2017-05-06 RX ADMIN — FINASTERIDE 5 MILLIGRAM(S): 5 TABLET, FILM COATED ORAL at 11:35

## 2017-05-06 RX ADMIN — Medication 1 TABLET(S): at 08:51

## 2017-05-06 RX ADMIN — Medication 3 MILLILITER(S): at 16:33

## 2017-05-06 RX ADMIN — MAGNESIUM OXIDE 400 MG ORAL TABLET 400 MILLIGRAM(S): 241.3 TABLET ORAL at 08:51

## 2017-05-06 RX ADMIN — PIPERACILLIN AND TAZOBACTAM 25 GRAM(S): 4; .5 INJECTION, POWDER, LYOPHILIZED, FOR SOLUTION INTRAVENOUS at 05:51

## 2017-05-06 RX ADMIN — Medication 3 MILLILITER(S): at 11:03

## 2017-05-06 RX ADMIN — PANTOPRAZOLE SODIUM 40 MILLIGRAM(S): 20 TABLET, DELAYED RELEASE ORAL at 05:50

## 2017-05-06 RX ADMIN — PANTOPRAZOLE SODIUM 40 MILLIGRAM(S): 20 TABLET, DELAYED RELEASE ORAL at 16:27

## 2017-05-06 RX ADMIN — PIPERACILLIN AND TAZOBACTAM 25 GRAM(S): 4; .5 INJECTION, POWDER, LYOPHILIZED, FOR SOLUTION INTRAVENOUS at 22:02

## 2017-05-06 RX ADMIN — Medication 300 MILLIGRAM(S): at 11:35

## 2017-05-06 RX ADMIN — Medication 1 MILLIGRAM(S): at 11:35

## 2017-05-06 RX ADMIN — PIPERACILLIN AND TAZOBACTAM 25 GRAM(S): 4; .5 INJECTION, POWDER, LYOPHILIZED, FOR SOLUTION INTRAVENOUS at 14:04

## 2017-05-06 RX ADMIN — Medication 1 TABLET(S): at 22:02

## 2017-05-06 RX ADMIN — Medication 3 MILLILITER(S): at 22:21

## 2017-05-07 LAB — VANCOMYCIN TROUGH SERPL-MCNC: 14.5 UG/ML — SIGNIFICANT CHANGE UP (ref 10–20)

## 2017-05-07 PROCEDURE — 99233 SBSQ HOSP IP/OBS HIGH 50: CPT | Mod: GC

## 2017-05-07 RX ORDER — FUROSEMIDE 40 MG
40 TABLET ORAL ONCE
Qty: 0 | Refills: 0 | Status: COMPLETED | OUTPATIENT
Start: 2017-05-07 | End: 2017-05-07

## 2017-05-07 RX ORDER — FUROSEMIDE 40 MG
40 TABLET ORAL DAILY
Qty: 0 | Refills: 0 | Status: DISCONTINUED | OUTPATIENT
Start: 2017-05-08 | End: 2017-05-09

## 2017-05-07 RX ADMIN — Medication 1 TABLET(S): at 05:10

## 2017-05-07 RX ADMIN — FINASTERIDE 5 MILLIGRAM(S): 5 TABLET, FILM COATED ORAL at 11:23

## 2017-05-07 RX ADMIN — Medication 3 MILLILITER(S): at 10:12

## 2017-05-07 RX ADMIN — Medication 3 MILLILITER(S): at 16:49

## 2017-05-07 RX ADMIN — PIPERACILLIN AND TAZOBACTAM 25 GRAM(S): 4; .5 INJECTION, POWDER, LYOPHILIZED, FOR SOLUTION INTRAVENOUS at 21:37

## 2017-05-07 RX ADMIN — Medication 3 MILLILITER(S): at 22:04

## 2017-05-07 RX ADMIN — PIPERACILLIN AND TAZOBACTAM 25 GRAM(S): 4; .5 INJECTION, POWDER, LYOPHILIZED, FOR SOLUTION INTRAVENOUS at 05:00

## 2017-05-07 RX ADMIN — Medication 100 MICROGRAM(S): at 05:00

## 2017-05-07 RX ADMIN — Medication 40 MILLIGRAM(S): at 11:23

## 2017-05-07 RX ADMIN — Medication 3 MILLILITER(S): at 04:40

## 2017-05-07 RX ADMIN — Medication 250 MILLIGRAM(S): at 06:35

## 2017-05-07 RX ADMIN — PIPERACILLIN AND TAZOBACTAM 25 GRAM(S): 4; .5 INJECTION, POWDER, LYOPHILIZED, FOR SOLUTION INTRAVENOUS at 13:16

## 2017-05-07 RX ADMIN — PANTOPRAZOLE SODIUM 40 MILLIGRAM(S): 20 TABLET, DELAYED RELEASE ORAL at 17:22

## 2017-05-07 RX ADMIN — ATORVASTATIN CALCIUM 20 MILLIGRAM(S): 80 TABLET, FILM COATED ORAL at 21:37

## 2017-05-07 RX ADMIN — Medication 300 MILLIGRAM(S): at 11:23

## 2017-05-07 RX ADMIN — AMLODIPINE BESYLATE 10 MILLIGRAM(S): 2.5 TABLET ORAL at 05:00

## 2017-05-07 RX ADMIN — Medication 1 MILLIGRAM(S): at 11:23

## 2017-05-07 RX ADMIN — Medication 250 MILLIGRAM(S): at 17:22

## 2017-05-07 RX ADMIN — PANTOPRAZOLE SODIUM 40 MILLIGRAM(S): 20 TABLET, DELAYED RELEASE ORAL at 05:08

## 2017-05-08 PROCEDURE — 99233 SBSQ HOSP IP/OBS HIGH 50: CPT | Mod: GC

## 2017-05-08 PROCEDURE — 99233 SBSQ HOSP IP/OBS HIGH 50: CPT

## 2017-05-08 RX ADMIN — Medication 3 MILLILITER(S): at 09:27

## 2017-05-08 RX ADMIN — AMLODIPINE BESYLATE 10 MILLIGRAM(S): 2.5 TABLET ORAL at 05:09

## 2017-05-08 RX ADMIN — Medication 1 MILLIGRAM(S): at 11:58

## 2017-05-08 RX ADMIN — Medication 250 MILLIGRAM(S): at 17:06

## 2017-05-08 RX ADMIN — Medication 3 MILLILITER(S): at 16:03

## 2017-05-08 RX ADMIN — Medication 100 MICROGRAM(S): at 05:09

## 2017-05-08 RX ADMIN — Medication 3 MILLILITER(S): at 04:52

## 2017-05-08 RX ADMIN — Medication 300 MILLIGRAM(S): at 11:58

## 2017-05-08 RX ADMIN — PANTOPRAZOLE SODIUM 40 MILLIGRAM(S): 20 TABLET, DELAYED RELEASE ORAL at 05:09

## 2017-05-08 RX ADMIN — FINASTERIDE 5 MILLIGRAM(S): 5 TABLET, FILM COATED ORAL at 11:58

## 2017-05-08 RX ADMIN — PIPERACILLIN AND TAZOBACTAM 25 GRAM(S): 4; .5 INJECTION, POWDER, LYOPHILIZED, FOR SOLUTION INTRAVENOUS at 14:32

## 2017-05-08 RX ADMIN — Medication 250 MILLIGRAM(S): at 05:09

## 2017-05-08 RX ADMIN — PIPERACILLIN AND TAZOBACTAM 25 GRAM(S): 4; .5 INJECTION, POWDER, LYOPHILIZED, FOR SOLUTION INTRAVENOUS at 05:09

## 2017-05-08 RX ADMIN — PIPERACILLIN AND TAZOBACTAM 25 GRAM(S): 4; .5 INJECTION, POWDER, LYOPHILIZED, FOR SOLUTION INTRAVENOUS at 23:37

## 2017-05-08 RX ADMIN — PANTOPRAZOLE SODIUM 40 MILLIGRAM(S): 20 TABLET, DELAYED RELEASE ORAL at 17:16

## 2017-05-08 RX ADMIN — Medication 40 MILLIGRAM(S): at 05:12

## 2017-05-08 RX ADMIN — ATORVASTATIN CALCIUM 20 MILLIGRAM(S): 80 TABLET, FILM COATED ORAL at 22:43

## 2017-05-08 RX ADMIN — Medication 3 MILLILITER(S): at 21:45

## 2017-05-08 NOTE — DIETITIAN INITIAL EVALUATION ADULT. - PERTINENT MEDS FT
Vancomycin, Norvasc, Lasix, Allopurinol, Lipitor, Protonix DR, Proscar, Folic acid, Synthroid, Zosyn

## 2017-05-08 NOTE — DIETITIAN INITIAL EVALUATION ADULT. - OTHER INFO
Nutrition assessment initiated for LOS. Pt. alert, oriented, tolerating PO diet , able to take > 75% of the melas, allergic to some sea foods . w/ 1+ L & R arm edema . Pt. known from previous admission, noted wt. gain from last admission., partially related to edema .

## 2017-05-08 NOTE — DIETITIAN INITIAL EVALUATION ADULT. - PROBLEM SELECTOR PLAN 6
- Chronic right pleff.   - CXR shows new left sided opacity v pleff.   - Unclear if pts pleff is amendable to diuresis. On last time pts fluid was exudative, had MIST II protocol in 1/2017 admission.  - Patient follows with house pulm, reconsult in AM

## 2017-05-08 NOTE — DIETITIAN INITIAL EVALUATION ADULT. - PROBLEM SELECTOR PLAN 7
- Pt is anemic with increased Bili.   - likely low level continued hemolysis  - Monitor H/H for now.

## 2017-05-09 VITALS — OXYGEN SATURATION: 99 %

## 2017-05-09 LAB
BASOPHILS # BLD AUTO: 0.18 K/UL — SIGNIFICANT CHANGE UP (ref 0–0.2)
BASOPHILS NFR BLD AUTO: 0.2 % — SIGNIFICANT CHANGE UP (ref 0–2)
BUN SERPL-MCNC: 11 MG/DL — SIGNIFICANT CHANGE UP (ref 7–23)
CALCIUM SERPL-MCNC: 7.9 MG/DL — LOW (ref 8.4–10.5)
CHLORIDE SERPL-SCNC: 101 MMOL/L — SIGNIFICANT CHANGE UP (ref 98–107)
CO2 SERPL-SCNC: 25 MMOL/L — SIGNIFICANT CHANGE UP (ref 22–31)
CREAT SERPL-MCNC: 1.07 MG/DL — SIGNIFICANT CHANGE UP (ref 0.5–1.3)
EOSINOPHIL # BLD AUTO: 0.11 K/UL — SIGNIFICANT CHANGE UP (ref 0–0.5)
EOSINOPHIL NFR BLD AUTO: 0.1 % — SIGNIFICANT CHANGE UP (ref 0–6)
GLUCOSE SERPL-MCNC: 100 MG/DL — HIGH (ref 70–99)
HCT VFR BLD CALC: 26.3 % — LOW (ref 39–50)
HGB BLD-MCNC: 7.9 G/DL — LOW (ref 13–17)
IMM GRANULOCYTES NFR BLD AUTO: 0.2 % — SIGNIFICANT CHANGE UP (ref 0–1.5)
LYMPHOCYTES # BLD AUTO: 84.09 K/UL — HIGH (ref 1–3.3)
LYMPHOCYTES # BLD AUTO: 94.9 % — HIGH (ref 13–44)
MAGNESIUM SERPL-MCNC: 1.4 MG/DL — LOW (ref 1.6–2.6)
MANUAL SMEAR VERIFICATION: SIGNIFICANT CHANGE UP
MCHC RBC-ENTMCNC: 30 % — LOW (ref 32–36)
MCHC RBC-ENTMCNC: 31.5 PG — SIGNIFICANT CHANGE UP (ref 27–34)
MCV RBC AUTO: 104.8 FL — HIGH (ref 80–100)
MONOCYTES # BLD AUTO: 1.76 K/UL — HIGH (ref 0–0.9)
MONOCYTES NFR BLD AUTO: 2 % — SIGNIFICANT CHANGE UP (ref 2–14)
NEUTROPHILS # BLD AUTO: 2.23 K/UL — SIGNIFICANT CHANGE UP (ref 1.8–7.4)
NEUTROPHILS NFR BLD AUTO: 2.6 % — LOW (ref 43–77)
PHOSPHATE SERPL-MCNC: 2.8 MG/DL — SIGNIFICANT CHANGE UP (ref 2.5–4.5)
PLATELET # BLD AUTO: 77 K/UL — LOW (ref 150–400)
PMV BLD: 9.6 FL — SIGNIFICANT CHANGE UP (ref 7–13)
POTASSIUM SERPL-MCNC: 3.7 MMOL/L — SIGNIFICANT CHANGE UP (ref 3.5–5.3)
POTASSIUM SERPL-SCNC: 3.7 MMOL/L — SIGNIFICANT CHANGE UP (ref 3.5–5.3)
RBC # BLD: 2.51 M/UL — LOW (ref 4.2–5.8)
RBC # FLD: 24.3 % — HIGH (ref 10.3–14.5)
SODIUM SERPL-SCNC: 145 MMOL/L — SIGNIFICANT CHANGE UP (ref 135–145)
WBC # BLD: 88.57 K/UL — CRITICAL HIGH (ref 3.8–10.5)
WBC # FLD AUTO: 88.57 K/UL — CRITICAL HIGH (ref 3.8–10.5)

## 2017-05-09 PROCEDURE — 99239 HOSP IP/OBS DSCHRG MGMT >30: CPT

## 2017-05-09 RX ORDER — PANTOPRAZOLE SODIUM 20 MG/1
1 TABLET, DELAYED RELEASE ORAL
Qty: 60 | Refills: 0 | OUTPATIENT
Start: 2017-05-09 | End: 2017-06-08

## 2017-05-09 RX ORDER — ALBUTEROL 90 UG/1
1 AEROSOL, METERED ORAL
Qty: 1 | Refills: 0 | OUTPATIENT
Start: 2017-05-09

## 2017-05-09 RX ORDER — FUROSEMIDE 40 MG
1 TABLET ORAL
Qty: 30 | Refills: 0 | OUTPATIENT
Start: 2017-05-09 | End: 2017-06-08

## 2017-05-09 RX ORDER — PANTOPRAZOLE SODIUM 20 MG/1
1 TABLET, DELAYED RELEASE ORAL
Qty: 30 | Refills: 0 | OUTPATIENT
Start: 2017-05-09 | End: 2017-06-08

## 2017-05-09 RX ADMIN — PANTOPRAZOLE SODIUM 40 MILLIGRAM(S): 20 TABLET, DELAYED RELEASE ORAL at 06:52

## 2017-05-09 RX ADMIN — Medication 3 MILLILITER(S): at 11:43

## 2017-05-09 RX ADMIN — Medication 40 MILLIGRAM(S): at 06:52

## 2017-05-09 RX ADMIN — AMLODIPINE BESYLATE 10 MILLIGRAM(S): 2.5 TABLET ORAL at 06:52

## 2017-05-09 RX ADMIN — Medication 3 MILLILITER(S): at 03:32

## 2017-05-09 RX ADMIN — Medication 100 MICROGRAM(S): at 06:52

## 2017-05-15 ENCOUNTER — RESULT REVIEW (OUTPATIENT)
Age: 82
End: 2017-05-15

## 2017-05-15 ENCOUNTER — RX RENEWAL (OUTPATIENT)
Age: 82
End: 2017-05-15

## 2017-05-15 ENCOUNTER — APPOINTMENT (OUTPATIENT)
Dept: HEMATOLOGY ONCOLOGY | Facility: CLINIC | Age: 82
End: 2017-05-15

## 2017-05-15 ENCOUNTER — OUTPATIENT (OUTPATIENT)
Dept: OUTPATIENT SERVICES | Facility: HOSPITAL | Age: 82
LOS: 1 days | End: 2017-05-15
Payer: MEDICARE

## 2017-05-15 ENCOUNTER — MESSAGE (OUTPATIENT)
Age: 82
End: 2017-05-15

## 2017-05-15 VITALS
OXYGEN SATURATION: 98 % | BODY MASS INDEX: 20.54 KG/M2 | TEMPERATURE: 97.4 F | RESPIRATION RATE: 16 BRPM | HEART RATE: 77 BPM | SYSTOLIC BLOOD PRESSURE: 126 MMHG | WEIGHT: 131.17 LBS | DIASTOLIC BLOOD PRESSURE: 55 MMHG

## 2017-05-15 DIAGNOSIS — E87.6 HYPOKALEMIA: ICD-10-CM

## 2017-05-15 DIAGNOSIS — D70.9 NEUTROPENIA, UNSPECIFIED: ICD-10-CM

## 2017-05-15 LAB
ANISOCYTOSIS BLD QL: SLIGHT — SIGNIFICANT CHANGE UP
BASO STIPL BLD QL SMEAR: PRESENT — SIGNIFICANT CHANGE UP
BASOPHILS # BLD AUTO: 1.1 K/UL — HIGH (ref 0–0.2)
DACRYOCYTES BLD QL SMEAR: SLIGHT — SIGNIFICANT CHANGE UP
DAT C3-SP REAG RBC QL: POSITIVE — SIGNIFICANT CHANGE UP
DAT POLY-SP REAG RBC QL: POSITIVE — SIGNIFICANT CHANGE UP
DIRECT COOMBS IGG: POSITIVE — SIGNIFICANT CHANGE UP
EOSINOPHIL # BLD AUTO: 0.1 K/UL — SIGNIFICANT CHANGE UP (ref 0–0.5)
HCT VFR BLD CALC: 21.3 % — LOW (ref 39–50)
HGB BLD-MCNC: 6.9 G/DL — CRITICAL LOW (ref 13–17)
LYMPHOCYTES # BLD AUTO: 71.7 K/UL — HIGH (ref 1–3.3)
LYMPHOCYTES # BLD AUTO: 94 % — HIGH (ref 13–44)
MACROCYTES BLD QL: SLIGHT — SIGNIFICANT CHANGE UP
MCHC RBC-ENTMCNC: 32.2 G/DL — SIGNIFICANT CHANGE UP (ref 32–36)
MCHC RBC-ENTMCNC: 32.6 PG — SIGNIFICANT CHANGE UP (ref 27–34)
MCV RBC AUTO: 101 FL — HIGH (ref 80–100)
MONOCYTES # BLD AUTO: 0.6 K/UL — SIGNIFICANT CHANGE UP (ref 0–0.9)
MONOCYTES NFR BLD AUTO: 1 % — LOW (ref 2–14)
NEUTROPHILS # BLD AUTO: 5.3 K/UL — SIGNIFICANT CHANGE UP (ref 1.8–7.4)
NEUTROPHILS NFR BLD AUTO: 5 % — LOW (ref 43–77)
OVALOCYTES BLD QL SMEAR: SLIGHT — SIGNIFICANT CHANGE UP
PLAT MORPH BLD: NORMAL — SIGNIFICANT CHANGE UP
PLATELET # BLD AUTO: 136 K/UL — LOW (ref 150–400)
POIKILOCYTOSIS BLD QL AUTO: SLIGHT — SIGNIFICANT CHANGE UP
POLYCHROMASIA BLD QL SMEAR: SIGNIFICANT CHANGE UP
RBC # BLD: 2.11 M/UL — LOW (ref 4.2–5.8)
RBC # FLD: 19.5 % — HIGH (ref 10.3–14.5)
RBC BLD AUTO: ABNORMAL
SMUDGE CELLS # BLD: PRESENT — SIGNIFICANT CHANGE UP
WBC # BLD: 78.8 K/UL — CRITICAL HIGH (ref 3.8–10.5)
WBC # FLD AUTO: 78.8 K/UL — CRITICAL HIGH (ref 3.8–10.5)

## 2017-05-15 PROCEDURE — 86077 PHYS BLOOD BANK SERV XMATCH: CPT

## 2017-05-16 ENCOUNTER — APPOINTMENT (OUTPATIENT)
Dept: INFUSION THERAPY | Facility: HOSPITAL | Age: 82
End: 2017-05-16

## 2017-05-16 ENCOUNTER — RESULT REVIEW (OUTPATIENT)
Age: 82
End: 2017-05-16

## 2017-05-16 LAB
ALBUMIN SERPL ELPH-MCNC: 3.9 G/DL
ALP BLD-CCNC: 94 U/L
ALT SERPL-CCNC: 9 U/L
ANION GAP SERPL CALC-SCNC: 19 MMOL/L
AST SERPL-CCNC: 15 U/L
BILIRUB SERPL-MCNC: 1.2 MG/DL
BUN SERPL-MCNC: 18 MG/DL
CALCIUM SERPL-MCNC: 7.6 MG/DL
CHLORIDE SERPL-SCNC: 99 MMOL/L
CO2 SERPL-SCNC: 25 MMOL/L
CREAT SERPL-MCNC: 1.19 MG/DL
GLUCOSE SERPL-MCNC: 105 MG/DL
HAPTOGLOB SERPL-MCNC: 185 MG/DL
POTASSIUM SERPL-SCNC: 2.8 MMOL/L
PROT SERPL-MCNC: 5.4 G/DL
SODIUM SERPL-SCNC: 143 MMOL/L

## 2017-05-17 DIAGNOSIS — Z51.89 ENCOUNTER FOR OTHER SPECIFIED AFTERCARE: ICD-10-CM

## 2017-05-17 DIAGNOSIS — R11.2 NAUSEA WITH VOMITING, UNSPECIFIED: ICD-10-CM

## 2017-05-19 ENCOUNTER — APPOINTMENT (OUTPATIENT)
Dept: PULMONOLOGY | Facility: CLINIC | Age: 82
End: 2017-05-19

## 2017-05-19 VITALS
BODY MASS INDEX: 20.56 KG/M2 | DIASTOLIC BLOOD PRESSURE: 60 MMHG | SYSTOLIC BLOOD PRESSURE: 130 MMHG | TEMPERATURE: 99.5 F | WEIGHT: 131 LBS | RESPIRATION RATE: 16 BRPM | HEART RATE: 74 BPM | HEIGHT: 67 IN

## 2017-05-19 DIAGNOSIS — J47.9 BRONCHIECTASIS, UNCOMPLICATED: ICD-10-CM

## 2017-05-19 DIAGNOSIS — E87.70 FLUID OVERLOAD, UNSPECIFIED: ICD-10-CM

## 2017-05-19 RX ORDER — FUROSEMIDE 20 MG/1
20 TABLET ORAL
Qty: 60 | Refills: 2 | Status: ACTIVE | COMMUNITY
Start: 2017-05-19 | End: 1900-01-01

## 2017-05-19 RX ORDER — FLUTICASONE FUROATE AND VILANTEROL TRIFENATATE 100; 25 UG/1; UG/1
100-25 POWDER RESPIRATORY (INHALATION) DAILY
Qty: 1 | Refills: 3 | Status: ACTIVE | COMMUNITY
Start: 2017-05-19 | End: 1900-01-01

## 2017-05-22 ENCOUNTER — RESULT REVIEW (OUTPATIENT)
Age: 82
End: 2017-05-22

## 2017-05-22 ENCOUNTER — APPOINTMENT (OUTPATIENT)
Dept: HEMATOLOGY ONCOLOGY | Facility: CLINIC | Age: 82
End: 2017-05-22

## 2017-05-22 VITALS
DIASTOLIC BLOOD PRESSURE: 58 MMHG | HEART RATE: 85 BPM | SYSTOLIC BLOOD PRESSURE: 136 MMHG | BODY MASS INDEX: 20.1 KG/M2 | WEIGHT: 128.31 LBS | TEMPERATURE: 97.5 F | RESPIRATION RATE: 16 BRPM | OXYGEN SATURATION: 92 %

## 2017-05-22 DIAGNOSIS — C91.10 CHRONIC LYMPHOCYTIC LEUKEMIA OF B-CELL TYPE NOT HAVING ACHIEVED REMISSION: ICD-10-CM

## 2017-05-22 DIAGNOSIS — D59.1 OTHER AUTOIMMUNE HEMOLYTIC ANEMIAS: ICD-10-CM

## 2017-05-22 LAB
ANION GAP SERPL CALC-SCNC: 21 MMOL/L
BASOPHILS # BLD AUTO: 0.3 K/UL — HIGH (ref 0–0.2)
BLD GP AB SCN SERPL QL: POSITIVE — SIGNIFICANT CHANGE UP
BUN SERPL-MCNC: 17 MG/DL
CALCIUM SERPL-MCNC: 7.6 MG/DL
CHLORIDE SERPL-SCNC: 98 MMOL/L
CO2 SERPL-SCNC: 24 MMOL/L
CREAT SERPL-MCNC: 1.71 MG/DL
DACRYOCYTES BLD QL SMEAR: SLIGHT — SIGNIFICANT CHANGE UP
EOSINOPHIL # BLD AUTO: 0.1 K/UL — SIGNIFICANT CHANGE UP (ref 0–0.5)
GLUCOSE SERPL-MCNC: 91 MG/DL
HCT VFR BLD CALC: 27 % — LOW (ref 39–50)
HGB BLD-MCNC: 8.8 G/DL — LOW (ref 13–17)
HYPOCHROMIA BLD QL: SLIGHT — SIGNIFICANT CHANGE UP
LYMPHOCYTES # BLD AUTO: 57 K/UL — HIGH (ref 1–3.3)
LYMPHOCYTES # BLD AUTO: 86 % — HIGH (ref 13–44)
MAGNESIUM SERPL-MCNC: 1.4 MG/DL
MCHC RBC-ENTMCNC: 32.6 G/DL — SIGNIFICANT CHANGE UP (ref 32–36)
MCHC RBC-ENTMCNC: 33 PG — SIGNIFICANT CHANGE UP (ref 27–34)
MCV RBC AUTO: 101 FL — HIGH (ref 80–100)
MONOCYTES # BLD AUTO: 1 K/UL — HIGH (ref 0–0.9)
MONOCYTES NFR BLD AUTO: 2 % — SIGNIFICANT CHANGE UP (ref 2–14)
NEUTROPHILS # BLD AUTO: 4.6 K/UL — SIGNIFICANT CHANGE UP (ref 1.8–7.4)
NEUTROPHILS NFR BLD AUTO: 11 % — LOW (ref 43–77)
NEUTS BAND # BLD: 1 % — SIGNIFICANT CHANGE UP (ref 0–8)
OVALOCYTES BLD QL SMEAR: SLIGHT — SIGNIFICANT CHANGE UP
PHOSPHATE SERPL-MCNC: 3.1 MG/DL
PLAT MORPH BLD: NORMAL — SIGNIFICANT CHANGE UP
PLATELET # BLD AUTO: 104 K/UL — LOW (ref 150–400)
POIKILOCYTOSIS BLD QL AUTO: SLIGHT — SIGNIFICANT CHANGE UP
POLYCHROMASIA BLD QL SMEAR: SLIGHT — SIGNIFICANT CHANGE UP
POTASSIUM SERPL-SCNC: 4 MMOL/L
RBC # BLD: 2.67 M/UL — LOW (ref 4.2–5.8)
RBC # FLD: 17.7 % — HIGH (ref 10.3–14.5)
RBC BLD AUTO: SIGNIFICANT CHANGE UP
RH IG SCN BLD-IMP: POSITIVE — SIGNIFICANT CHANGE UP
SODIUM SERPL-SCNC: 143 MMOL/L
WBC # BLD: 63 K/UL — CRITICAL HIGH (ref 3.8–10.5)
WBC # FLD AUTO: 63 K/UL — CRITICAL HIGH (ref 3.8–10.5)

## 2017-05-22 RX ORDER — FOLIC ACID 0.8 MG
500 TABLET ORAL DAILY
Qty: 7 | Refills: 0 | Status: ACTIVE | COMMUNITY
Start: 2017-05-22 | End: 1900-01-01

## 2017-05-23 LAB
ANTIBODY ID 1_1: SIGNIFICANT CHANGE UP
DAT C3-SP REAG RBC QL: POSITIVE — SIGNIFICANT CHANGE UP
DAT POLY-SP REAG RBC QL: POSITIVE — SIGNIFICANT CHANGE UP
DIRECT COOMBS IGG: POSITIVE — SIGNIFICANT CHANGE UP

## 2017-05-23 PROCEDURE — 86901 BLOOD TYPING SEROLOGIC RH(D): CPT

## 2017-05-23 PROCEDURE — 86880 COOMBS TEST DIRECT: CPT

## 2017-05-23 PROCEDURE — 86850 RBC ANTIBODY SCREEN: CPT

## 2017-05-23 PROCEDURE — 86922 COMPATIBILITY TEST ANTIGLOB: CPT

## 2017-05-23 PROCEDURE — 86900 BLOOD TYPING SEROLOGIC ABO: CPT

## 2017-05-23 PROCEDURE — 86870 RBC ANTIBODY IDENTIFICATION: CPT

## 2017-05-25 ENCOUNTER — LABORATORY RESULT (OUTPATIENT)
Age: 82
End: 2017-05-25

## 2017-05-25 ENCOUNTER — OTHER (OUTPATIENT)
Age: 82
End: 2017-05-25

## 2017-05-28 RX ORDER — POTASSIUM CHLORIDE 1500 MG/1
20 TABLET, EXTENDED RELEASE ORAL DAILY
Qty: 30 | Refills: 2 | Status: ACTIVE | COMMUNITY
Start: 2017-05-28 | End: 1900-01-01

## 2017-05-30 ENCOUNTER — INPATIENT (INPATIENT)
Facility: HOSPITAL | Age: 82
LOS: 12 days | Discharge: HOSPICE | End: 2017-06-12
Attending: HOSPITALIST | Admitting: HOSPITALIST
Payer: MEDICARE

## 2017-05-30 VITALS
HEART RATE: 106 BPM | TEMPERATURE: 99 F | RESPIRATION RATE: 18 BRPM | SYSTOLIC BLOOD PRESSURE: 136 MMHG | OXYGEN SATURATION: 94 % | DIASTOLIC BLOOD PRESSURE: 50 MMHG

## 2017-05-30 LAB
HCT VFR BLD CALC: 26.7 % — LOW (ref 39–50)
HGB BLD-MCNC: 8.2 G/DL — LOW (ref 13–17)
MAGNESIUM SERPL-MCNC: 0.7 MG/DL — CRITICAL LOW (ref 1.6–2.6)
MCHC RBC-ENTMCNC: 30.7 % — LOW (ref 32–36)
MCHC RBC-ENTMCNC: 32.8 PG — SIGNIFICANT CHANGE UP (ref 27–34)
MCV RBC AUTO: 106.8 FL — HIGH (ref 80–100)
PHOSPHATE SERPL-MCNC: 3.7 MG/DL — SIGNIFICANT CHANGE UP (ref 2.5–4.5)
PLATELET # BLD AUTO: 120 K/UL — LOW (ref 150–400)
PMV BLD: 9.7 FL — SIGNIFICANT CHANGE UP (ref 7–13)
RBC # BLD: 2.5 M/UL — LOW (ref 4.2–5.8)
RBC # FLD: 18.1 % — HIGH (ref 10.3–14.5)
WBC # BLD: 120.91 K/UL — CRITICAL HIGH (ref 3.8–10.5)
WBC # FLD AUTO: 120.91 K/UL — CRITICAL HIGH (ref 3.8–10.5)

## 2017-05-30 NOTE — ED ADULT NURSE NOTE - OBJECTIVE STATEMENT
pt. came in for abnormal labs. as per daughter, pt. has hx of CLL, had chemo 4 weeks ago. pt. had lab test done this morning and his PMD called that his Calcium and Bicarb are low. pt. states he had pain on upper abdomen earlier, but denies any at this time. denies any n/v/d. denies any SOB nor weakness. labs were drawn and sent. g20 saline lock inserted on rt wrist with no ss of infiltration. EKG done. will continue to monitor

## 2017-05-30 NOTE — PATIENT PROFILE ADULT. - VISION (WITH CORRECTIVE LENSES IF THE PATIENT USUALLY WEARS THEM):
[address] fax orders for Noa. Advised faxed 5/25/17. Orders re-faxed to [phone]. Normal vision: sees adequately in most situations; can see medication labels, newsprint

## 2017-05-30 NOTE — ED PROVIDER NOTE - CARE PLAN
Principal Discharge DX:	Hypomagnesemia  Secondary Diagnosis:	CLL (chronic lymphocytic leukemia)  Secondary Diagnosis:	Hyperkalemia

## 2017-05-30 NOTE — ED PROVIDER NOTE - OBJECTIVE STATEMENT
h/o 86 yr old male with hx of HTN, hypothyroid, 86 yr old male with hx of leukemia on chemotherapy, recently started on lasix for pleural effusions, was in clinic for routine follow-up when lab work was drawn and significant for low calcium, so sent to ER for evaluation.  patient endorses no new complaints 86 yr old male with hx of BPH, HTN, hypothyroid, hemolytic anemia, CLL on chemotherapy ( last 4 weeks ago), recently started on lasix for pleural effusions, was in clinic for routine follow-up when lab work was drawn and significant for low calcium, so sent to ER for evaluation.  patient endorses no new complaints   Denies any other symptoms.    Oncologist- Dr. Mathew   Pulmonologist- Dr. Uribe.

## 2017-05-30 NOTE — ED ADULT NURSE NOTE - ED STAT RN HANDOFF DETAILS
verbal report given to IRMA Boone. pt. has been tachypneic, on 2L O2 via nc, coughing, denies any pain, in NAD at this time.

## 2017-05-30 NOTE — ED ADULT NURSE NOTE - CHIEF COMPLAINT QUOTE
Pt comes in for abnormal labs values after going to see PMD this morning for blood work. Pt received call about 30 minutes ago stating H/H was low and he need to come to ER. PMHX of CLL, last chemo 4 wks ago.

## 2017-05-30 NOTE — ED PROVIDER NOTE - ATTENDING CONTRIBUTION TO CARE
DR. PORTER, ATTENDING MD-  I performed a face to face bedside interview with patient regarding history of present illness, review of symptoms and past medical history. I completed an independent physical exam.  I have discussed patient's plan of care with PA.   Documentation as above in the note.    attending note:  CLL with multiple lab abnormalities including marked leukocytosis (120k), sever hypomagnesemia, hyperkalemia, donte, hyperkalemia.  Will d/w oncology, give repletion, IVFs, check CXR, admit.  On my exam: abd soft, lungs dec BS rt.

## 2017-05-30 NOTE — ED PROVIDER NOTE - PROGRESS NOTE DETAILS
Paged heme/ onco fellow at 1245 am, 1am, no response, awaiting call back. Spoke to heme onc fellow, pt to be admitted to hospitalist and will follow. Xray showing possible multifocal PNA. Will be treated. Pt accepted to hospitalist Dr. Dorado. MAR text paged. Pt agrees with plan.

## 2017-05-30 NOTE — ED PROVIDER NOTE - MEDICAL DECISION MAKING DETAILS
CLL with multiple lab abnormalities including marked leukocytosis (120k), sever hypomagnesemia, hyperkalemia, donte, hyperkalemia.  Will d/w oncology, give repletion, IVFs, check CXR, admit

## 2017-05-31 DIAGNOSIS — J18.9 PNEUMONIA, UNSPECIFIED ORGANISM: ICD-10-CM

## 2017-05-31 DIAGNOSIS — E83.42 HYPOMAGNESEMIA: ICD-10-CM

## 2017-05-31 DIAGNOSIS — C91.10 CHRONIC LYMPHOCYTIC LEUKEMIA OF B-CELL TYPE NOT HAVING ACHIEVED REMISSION: ICD-10-CM

## 2017-05-31 DIAGNOSIS — N40.0 BENIGN PROSTATIC HYPERPLASIA WITHOUT LOWER URINARY TRACT SYMPTOMS: ICD-10-CM

## 2017-05-31 DIAGNOSIS — E88.3 TUMOR LYSIS SYNDROME: ICD-10-CM

## 2017-05-31 DIAGNOSIS — E87.8 OTHER DISORDERS OF ELECTROLYTE AND FLUID BALANCE, NOT ELSEWHERE CLASSIFIED: ICD-10-CM

## 2017-05-31 DIAGNOSIS — I82.90 ACUTE EMBOLISM AND THROMBOSIS OF UNSPECIFIED VEIN: ICD-10-CM

## 2017-05-31 DIAGNOSIS — I10 ESSENTIAL (PRIMARY) HYPERTENSION: ICD-10-CM

## 2017-05-31 DIAGNOSIS — E83.51 HYPOCALCEMIA: ICD-10-CM

## 2017-05-31 DIAGNOSIS — E78.5 HYPERLIPIDEMIA, UNSPECIFIED: ICD-10-CM

## 2017-05-31 DIAGNOSIS — A41.9 SEPSIS, UNSPECIFIED ORGANISM: ICD-10-CM

## 2017-05-31 DIAGNOSIS — E03.9 HYPOTHYROIDISM, UNSPECIFIED: ICD-10-CM

## 2017-05-31 DIAGNOSIS — J90 PLEURAL EFFUSION, NOT ELSEWHERE CLASSIFIED: ICD-10-CM

## 2017-05-31 LAB
ALBUMIN SERPL ELPH-MCNC: 3.8 G/DL — SIGNIFICANT CHANGE UP (ref 3.3–5)
ALP SERPL-CCNC: 103 U/L — SIGNIFICANT CHANGE UP (ref 40–120)
ALT FLD-CCNC: 20 U/L — SIGNIFICANT CHANGE UP (ref 4–41)
ANION GAP SERPL CALC-SCNC: 23 MMOL/L
ANISOCYTOSIS BLD QL: SLIGHT — SIGNIFICANT CHANGE UP
ANISOCYTOSIS BLD QL: SLIGHT — SIGNIFICANT CHANGE UP
ANTIBODY ID 1_1: SIGNIFICANT CHANGE UP
ANTIBODY ID 1_2: SIGNIFICANT CHANGE UP
AST SERPL-CCNC: 29 U/L — SIGNIFICANT CHANGE UP (ref 4–40)
BASE EXCESS BLDV CALC-SCNC: -2.2 MMOL/L — SIGNIFICANT CHANGE UP
BASOPHILS NFR SPEC: 0 % — SIGNIFICANT CHANGE UP (ref 0–2)
BASOPHILS NFR SPEC: 0 % — SIGNIFICANT CHANGE UP (ref 0–2)
BILIRUB SERPL-MCNC: 2.8 MG/DL — HIGH (ref 0.2–1.2)
BLASTS # FLD: 0 % — SIGNIFICANT CHANGE UP (ref 0–0)
BLD GP AB SCN SERPL QL: POSITIVE — SIGNIFICANT CHANGE UP
BLOOD GAS VENOUS - CREATININE: 1.29 MG/DL — SIGNIFICANT CHANGE UP (ref 0.5–1.3)
BUN SERPL-MCNC: 30 MG/DL — HIGH (ref 7–23)
BUN SERPL-MCNC: 31 MG/DL
BUN SERPL-MCNC: 35 MG/DL — HIGH (ref 7–23)
CALCIUM SERPL-MCNC: 6.1 MG/DL
CALCIUM SERPL-MCNC: 6.7 MG/DL — LOW (ref 8.4–10.5)
CALCIUM SERPL-MCNC: 7.4 MG/DL — LOW (ref 8.4–10.5)
CHLORIDE BLDV-SCNC: 107 MMOL/L — SIGNIFICANT CHANGE UP (ref 96–108)
CHLORIDE SERPL-SCNC: 102 MMOL/L
CHLORIDE SERPL-SCNC: 102 MMOL/L — SIGNIFICANT CHANGE UP (ref 98–107)
CHLORIDE SERPL-SCNC: 105 MMOL/L — SIGNIFICANT CHANGE UP (ref 98–107)
CO2 SERPL-SCNC: 17 MMOL/L — LOW (ref 22–31)
CO2 SERPL-SCNC: 18 MMOL/L
CO2 SERPL-SCNC: 19 MMOL/L — LOW (ref 22–31)
CREAT SERPL-MCNC: 1.24 MG/DL — SIGNIFICANT CHANGE UP (ref 0.5–1.3)
CREAT SERPL-MCNC: 1.27 MG/DL — SIGNIFICANT CHANGE UP (ref 0.5–1.3)
CREAT SERPL-MCNC: 1.3 MG/DL
DAT C3-SP REAG RBC QL: POSITIVE — SIGNIFICANT CHANGE UP
DAT POLY-SP REAG RBC QL: POSITIVE — SIGNIFICANT CHANGE UP
DIRECT COOMBS IGG: POSITIVE — SIGNIFICANT CHANGE UP
ELUATE ANTIBODY 1: SIGNIFICANT CHANGE UP
EOSINOPHIL NFR FLD: 0 % — SIGNIFICANT CHANGE UP (ref 0–6)
EOSINOPHIL NFR FLD: 0 % — SIGNIFICANT CHANGE UP (ref 0–6)
GAS PNL BLDV: 135 MMOL/L — LOW (ref 136–146)
GIANT PLATELETS BLD QL SMEAR: PRESENT — SIGNIFICANT CHANGE UP
GLUCOSE BLDV-MCNC: 123 — HIGH (ref 70–99)
GLUCOSE SERPL-MCNC: 103 MG/DL
GLUCOSE SERPL-MCNC: 119 MG/DL — HIGH (ref 70–99)
GLUCOSE SERPL-MCNC: 135 MG/DL — HIGH (ref 70–99)
HAPTOGLOB SERPL-MCNC: 193 MG/DL — SIGNIFICANT CHANGE UP (ref 34–200)
HAPTOGLOB SERPL-MCNC: 208 MG/DL — HIGH (ref 34–200)
HCO3 BLDV-SCNC: 23 MMOL/L — SIGNIFICANT CHANGE UP (ref 20–27)
HCT VFR BLD CALC: 23.4 % — LOW (ref 39–50)
HCT VFR BLD CALC: 23.5 % — LOW (ref 39–50)
HCT VFR BLDV CALC: 21.9 % — LOW (ref 39–51)
HGB BLD-MCNC: 7 G/DL — CRITICAL LOW (ref 13–17)
HGB BLD-MCNC: 7 G/DL — CRITICAL LOW (ref 13–17)
HGB BLDV-MCNC: 7 G/DL — CRITICAL LOW (ref 13–17)
LACTATE BLDV-MCNC: 1.1 MMOL/L — SIGNIFICANT CHANGE UP (ref 0.5–2)
LDH SERPL L TO P-CCNC: 403 U/L — HIGH (ref 135–225)
LDH SERPL L TO P-CCNC: 448 U/L — HIGH (ref 135–225)
LYMPHOCYTES NFR SPEC AUTO: 80 % — HIGH (ref 13–44)
LYMPHOCYTES NFR SPEC AUTO: 80.9 % — HIGH (ref 13–44)
MACROCYTES BLD QL: SLIGHT — SIGNIFICANT CHANGE UP
MAGNESIUM SERPL-MCNC: 1.5 MG/DL — LOW (ref 1.6–2.6)
MAGNESIUM SERPL-MCNC: 1.5 MG/DL — LOW (ref 1.6–2.6)
MAGNESIUM SERPL-MCNC: 1.7 MG/DL — SIGNIFICANT CHANGE UP (ref 1.6–2.6)
MANUAL SMEAR VERIFICATION: SIGNIFICANT CHANGE UP
MCHC RBC-ENTMCNC: 29.8 % — LOW (ref 32–36)
MCHC RBC-ENTMCNC: 29.9 % — LOW (ref 32–36)
MCHC RBC-ENTMCNC: 31.7 PG — SIGNIFICANT CHANGE UP (ref 27–34)
MCHC RBC-ENTMCNC: 32 PG — SIGNIFICANT CHANGE UP (ref 27–34)
MCV RBC AUTO: 105.9 FL — HIGH (ref 80–100)
MCV RBC AUTO: 107.3 FL — HIGH (ref 80–100)
METAMYELOCYTES # FLD: 0 % — SIGNIFICANT CHANGE UP (ref 0–1)
MONOCYTES NFR BLD: 2.6 % — SIGNIFICANT CHANGE UP (ref 2–9)
MONOCYTES NFR BLD: 3 % — SIGNIFICANT CHANGE UP (ref 2–9)
MYELOCYTES NFR BLD: 0 % — SIGNIFICANT CHANGE UP (ref 0–0)
NEUTROPHIL AB SER-ACNC: 10.4 % — LOW (ref 43–77)
NEUTROPHIL AB SER-ACNC: 15 % — LOW (ref 43–77)
NEUTS BAND # BLD: 2.6 % — SIGNIFICANT CHANGE UP (ref 0–6)
OTHER - HEMATOLOGY %: 0 — SIGNIFICANT CHANGE UP
PCO2 BLDV: 38 MMHG — LOW (ref 41–51)
PH BLDV: 7.38 PH — SIGNIFICANT CHANGE UP (ref 7.32–7.43)
PHOSPHATE SERPL-MCNC: 2.5 MG/DL — SIGNIFICANT CHANGE UP (ref 2.5–4.5)
PHOSPHATE SERPL-MCNC: 3.4 MG/DL — SIGNIFICANT CHANGE UP (ref 2.5–4.5)
PHOSPHATE SERPL-MCNC: 6 MG/DL — HIGH (ref 2.5–4.5)
PLATELET # BLD AUTO: 63 K/UL — LOW (ref 150–400)
PLATELET # BLD AUTO: 95 K/UL — LOW (ref 150–400)
PLATELET COUNT - ESTIMATE: SIGNIFICANT CHANGE UP
PMV BLD: 10.2 FL — SIGNIFICANT CHANGE UP (ref 7–13)
PMV BLD: 9.5 FL — SIGNIFICANT CHANGE UP (ref 7–13)
PO2 BLDV: 96 MMHG — HIGH (ref 35–40)
POLYCHROMASIA BLD QL SMEAR: SLIGHT — SIGNIFICANT CHANGE UP
POTASSIUM BLDV-SCNC: 3.6 MMOL/L — SIGNIFICANT CHANGE UP (ref 3.4–4.5)
POTASSIUM SERPL-MCNC: 4 MMOL/L — SIGNIFICANT CHANGE UP (ref 3.5–5.3)
POTASSIUM SERPL-MCNC: 4.6 MMOL/L — SIGNIFICANT CHANGE UP (ref 3.5–5.3)
POTASSIUM SERPL-SCNC: 4 MMOL/L — SIGNIFICANT CHANGE UP (ref 3.5–5.3)
POTASSIUM SERPL-SCNC: 4.6 MMOL/L — SIGNIFICANT CHANGE UP (ref 3.5–5.3)
POTASSIUM SERPL-SCNC: 4.8 MMOL/L
PROMYELOCYTES # FLD: 0 % — SIGNIFICANT CHANGE UP (ref 0–0)
PROT SERPL-MCNC: 5.5 G/DL — LOW (ref 6–8.3)
PTH-INTACT SERPL-MCNC: 104.2 PG/ML — HIGH (ref 15–65)
RBC # BLD: 2.19 M/UL — LOW (ref 4.2–5.8)
RBC # BLD: 2.21 M/UL — LOW (ref 4.2–5.8)
RBC # FLD: 17.7 % — HIGH (ref 10.3–14.5)
RBC # FLD: 17.8 % — HIGH (ref 10.3–14.5)
RH IG SCN BLD-IMP: POSITIVE — SIGNIFICANT CHANGE UP
SAO2 % BLDV: 98.4 % — HIGH (ref 60–85)
SODIUM SERPL-SCNC: 140 MMOL/L — SIGNIFICANT CHANGE UP (ref 135–145)
SODIUM SERPL-SCNC: 142 MMOL/L — SIGNIFICANT CHANGE UP (ref 135–145)
SODIUM SERPL-SCNC: 143 MMOL/L
URATE SERPL-MCNC: 4.7 MG/DL — SIGNIFICANT CHANGE UP (ref 3.4–8.8)
URATE SERPL-MCNC: 4.8 MG/DL — SIGNIFICANT CHANGE UP (ref 3.4–8.8)
VARIANT LYMPHS # BLD: 2 % — SIGNIFICANT CHANGE UP
VARIANT LYMPHS # BLD: 3.5 % — SIGNIFICANT CHANGE UP
WBC # BLD: 78.33 K/UL — CRITICAL HIGH (ref 3.8–10.5)
WBC # BLD: 80.81 K/UL — CRITICAL HIGH (ref 3.8–10.5)
WBC # FLD AUTO: 78.33 K/UL — CRITICAL HIGH (ref 3.8–10.5)
WBC # FLD AUTO: 80.81 K/UL — CRITICAL HIGH (ref 3.8–10.5)

## 2017-05-31 PROCEDURE — 86077 PHYS BLOOD BANK SERV XMATCH: CPT

## 2017-05-31 PROCEDURE — 12345: CPT | Mod: GC,NC

## 2017-05-31 PROCEDURE — 99223 1ST HOSP IP/OBS HIGH 75: CPT | Mod: GC

## 2017-05-31 PROCEDURE — 71020: CPT | Mod: 26

## 2017-05-31 RX ORDER — CALCIUM GLUCONATE 100 MG/ML
1 VIAL (ML) INTRAVENOUS ONCE
Qty: 0 | Refills: 0 | Status: DISCONTINUED | OUTPATIENT
Start: 2017-05-31 | End: 2017-06-01

## 2017-05-31 RX ORDER — AZITHROMYCIN 500 MG/1
500 TABLET, FILM COATED ORAL ONCE
Qty: 0 | Refills: 0 | Status: DISCONTINUED | OUTPATIENT
Start: 2017-05-31 | End: 2017-05-31

## 2017-05-31 RX ORDER — CALCIUM GLUCONATE 100 MG/ML
2 VIAL (ML) INTRAVENOUS ONCE
Qty: 0 | Refills: 0 | Status: DISCONTINUED | OUTPATIENT
Start: 2017-05-31 | End: 2017-05-31

## 2017-05-31 RX ORDER — HEPARIN SODIUM 5000 [USP'U]/ML
5000 INJECTION INTRAVENOUS; SUBCUTANEOUS EVERY 12 HOURS
Qty: 0 | Refills: 0 | Status: DISCONTINUED | OUTPATIENT
Start: 2017-05-31 | End: 2017-06-12

## 2017-05-31 RX ORDER — PANTOPRAZOLE SODIUM 20 MG/1
40 TABLET, DELAYED RELEASE ORAL
Qty: 0 | Refills: 0 | Status: DISCONTINUED | OUTPATIENT
Start: 2017-05-31 | End: 2017-06-01

## 2017-05-31 RX ORDER — ALLOPURINOL 300 MG
300 TABLET ORAL DAILY
Qty: 0 | Refills: 0 | Status: DISCONTINUED | OUTPATIENT
Start: 2017-05-31 | End: 2017-06-12

## 2017-05-31 RX ORDER — SODIUM CHLORIDE 9 MG/ML
1000 INJECTION, SOLUTION INTRAVENOUS
Qty: 0 | Refills: 0 | Status: DISCONTINUED | OUTPATIENT
Start: 2017-05-31 | End: 2017-05-31

## 2017-05-31 RX ORDER — AZITHROMYCIN 500 MG/1
TABLET, FILM COATED ORAL
Qty: 0 | Refills: 0 | Status: DISCONTINUED | OUTPATIENT
Start: 2017-05-31 | End: 2017-05-31

## 2017-05-31 RX ORDER — CIPROFLOXACIN LACTATE 400MG/40ML
400 VIAL (ML) INTRAVENOUS ONCE
Qty: 0 | Refills: 0 | Status: COMPLETED | OUTPATIENT
Start: 2017-05-31 | End: 2017-05-31

## 2017-05-31 RX ORDER — FUROSEMIDE 40 MG
40 TABLET ORAL DAILY
Qty: 0 | Refills: 0 | Status: DISCONTINUED | OUTPATIENT
Start: 2017-05-31 | End: 2017-05-31

## 2017-05-31 RX ORDER — ALLOPURINOL 300 MG
100 TABLET ORAL DAILY
Qty: 0 | Refills: 0 | Status: DISCONTINUED | OUTPATIENT
Start: 2017-05-31 | End: 2017-05-31

## 2017-05-31 RX ORDER — IPRATROPIUM/ALBUTEROL SULFATE 18-103MCG
3 AEROSOL WITH ADAPTER (GRAM) INHALATION EVERY 6 HOURS
Qty: 0 | Refills: 0 | Status: DISCONTINUED | OUTPATIENT
Start: 2017-05-31 | End: 2017-06-12

## 2017-05-31 RX ORDER — CALCIUM GLUCONATE 100 MG/ML
1 VIAL (ML) INTRAVENOUS ONCE
Qty: 0 | Refills: 0 | Status: COMPLETED | OUTPATIENT
Start: 2017-05-31 | End: 2017-05-31

## 2017-05-31 RX ORDER — SODIUM CHLORIDE 9 MG/ML
1000 INJECTION INTRAMUSCULAR; INTRAVENOUS; SUBCUTANEOUS
Qty: 0 | Refills: 0 | Status: DISCONTINUED | OUTPATIENT
Start: 2017-05-31 | End: 2017-05-31

## 2017-05-31 RX ORDER — TIOTROPIUM BROMIDE AND OLODATEROL 3.124; 2.736 UG/1; UG/1
2 SPRAY, METERED RESPIRATORY (INHALATION)
Qty: 0 | Refills: 0 | COMMUNITY

## 2017-05-31 RX ORDER — MAGNESIUM SULFATE 500 MG/ML
2 VIAL (ML) INJECTION ONCE
Qty: 0 | Refills: 0 | Status: COMPLETED | OUTPATIENT
Start: 2017-05-31 | End: 2017-05-31

## 2017-05-31 RX ORDER — FOLIC ACID 0.8 MG
1 TABLET ORAL DAILY
Qty: 0 | Refills: 0 | Status: DISCONTINUED | OUTPATIENT
Start: 2017-05-31 | End: 2017-06-12

## 2017-05-31 RX ORDER — ATORVASTATIN CALCIUM 80 MG/1
20 TABLET, FILM COATED ORAL AT BEDTIME
Qty: 0 | Refills: 0 | Status: DISCONTINUED | OUTPATIENT
Start: 2017-05-31 | End: 2017-06-12

## 2017-05-31 RX ORDER — PIPERACILLIN AND TAZOBACTAM 4; .5 G/20ML; G/20ML
3.38 INJECTION, POWDER, LYOPHILIZED, FOR SOLUTION INTRAVENOUS ONCE
Qty: 0 | Refills: 0 | Status: COMPLETED | OUTPATIENT
Start: 2017-05-31 | End: 2017-05-31

## 2017-05-31 RX ORDER — MAGNESIUM SULFATE 500 MG/ML
1 VIAL (ML) INJECTION ONCE
Qty: 0 | Refills: 0 | Status: COMPLETED | OUTPATIENT
Start: 2017-05-31 | End: 2017-05-31

## 2017-05-31 RX ORDER — FINASTERIDE 5 MG/1
5 TABLET, FILM COATED ORAL DAILY
Qty: 0 | Refills: 0 | Status: DISCONTINUED | OUTPATIENT
Start: 2017-05-31 | End: 2017-06-12

## 2017-05-31 RX ORDER — LEVOTHYROXINE SODIUM 125 MCG
100 TABLET ORAL DAILY
Qty: 0 | Refills: 0 | Status: DISCONTINUED | OUTPATIENT
Start: 2017-05-31 | End: 2017-06-12

## 2017-05-31 RX ORDER — FINASTERIDE 5 MG/1
1 TABLET, FILM COATED ORAL
Qty: 0 | Refills: 0 | COMMUNITY

## 2017-05-31 RX ORDER — CALCIUM GLUCONATE 100 MG/ML
1 VIAL (ML) INTRAVENOUS
Qty: 0 | Refills: 0 | Status: COMPLETED | OUTPATIENT
Start: 2017-05-31 | End: 2017-05-31

## 2017-05-31 RX ORDER — VANCOMYCIN HCL 1 G
1000 VIAL (EA) INTRAVENOUS ONCE
Qty: 0 | Refills: 0 | Status: COMPLETED | OUTPATIENT
Start: 2017-05-31 | End: 2017-05-31

## 2017-05-31 RX ORDER — CALCIUM CARBONATE 500(1250)
1 TABLET ORAL
Qty: 0 | Refills: 0 | Status: DISCONTINUED | OUTPATIENT
Start: 2017-05-31 | End: 2017-06-01

## 2017-05-31 RX ADMIN — Medication 1 TABLET(S): at 17:36

## 2017-05-31 RX ADMIN — Medication 200 MILLIGRAM(S): at 23:13

## 2017-05-31 RX ADMIN — Medication 50 GRAM(S): at 01:04

## 2017-05-31 RX ADMIN — Medication 200 GRAM(S): at 19:57

## 2017-05-31 RX ADMIN — Medication 3 MILLILITER(S): at 21:12

## 2017-05-31 RX ADMIN — Medication 1 MILLIGRAM(S): at 12:18

## 2017-05-31 RX ADMIN — FINASTERIDE 5 MILLIGRAM(S): 5 TABLET, FILM COATED ORAL at 12:18

## 2017-05-31 RX ADMIN — Medication 200 GRAM(S): at 17:36

## 2017-05-31 RX ADMIN — HEPARIN SODIUM 5000 UNIT(S): 5000 INJECTION INTRAVENOUS; SUBCUTANEOUS at 07:26

## 2017-05-31 RX ADMIN — HEPARIN SODIUM 5000 UNIT(S): 5000 INJECTION INTRAVENOUS; SUBCUTANEOUS at 17:37

## 2017-05-31 RX ADMIN — SODIUM CHLORIDE 125 MILLILITER(S): 9 INJECTION INTRAMUSCULAR; INTRAVENOUS; SUBCUTANEOUS at 05:26

## 2017-05-31 RX ADMIN — Medication 100 GRAM(S): at 09:34

## 2017-05-31 RX ADMIN — Medication 200 GRAM(S): at 04:34

## 2017-05-31 RX ADMIN — Medication 3 MILLILITER(S): at 17:03

## 2017-05-31 RX ADMIN — Medication 300 MILLIGRAM(S): at 12:18

## 2017-05-31 RX ADMIN — ATORVASTATIN CALCIUM 20 MILLIGRAM(S): 80 TABLET, FILM COATED ORAL at 21:41

## 2017-05-31 RX ADMIN — Medication 100 MICROGRAM(S): at 07:26

## 2017-05-31 RX ADMIN — Medication 250 MILLIGRAM(S): at 07:26

## 2017-05-31 RX ADMIN — Medication 200 MILLIGRAM(S): at 03:28

## 2017-05-31 RX ADMIN — PANTOPRAZOLE SODIUM 40 MILLIGRAM(S): 20 TABLET, DELAYED RELEASE ORAL at 07:26

## 2017-05-31 RX ADMIN — SODIUM CHLORIDE 100 MILLILITER(S): 9 INJECTION, SOLUTION INTRAVENOUS at 01:52

## 2017-05-31 RX ADMIN — PIPERACILLIN AND TAZOBACTAM 200 GRAM(S): 4; .5 INJECTION, POWDER, LYOPHILIZED, FOR SOLUTION INTRAVENOUS at 05:26

## 2017-05-31 NOTE — H&P ADULT - PROBLEM SELECTOR PLAN 2
Pt with h/o RLL PNA c/b parapneumonic effusion and LLL PNA.   - C/w vanc and zosyn for now given immunosuppressed stated, will add azithro  - F/u blood cxs  - Check urine legionella Ag  - Antitussives, mucolytics, chest PT, and duonebs Sepsis likely 2/2 multifocal PNA vs CLL lung changes. Meets SIRS criteria.  Pt with h/o RLL PNA c/b parapneumonic effusion and LLL PNA.   - C/w vanc and zosyn for now given immunosuppressed stated, will add azithro  - F/u blood cxs  - Check urine legionella Ag  - Antitussives, mucolytics, chest PT, and duonebs  - Vital signs q4hrs Sepsis due possible multifocal PNA vs CLL lung changes. Meets SIRS criteria.  Pt with h/o RLL PNA c/b parapneumonic effusion and LLL PNA.   - Treated empirically with 1 dose of Cipro iv, Vancomycin iv and Zosyn iv for now given immunosuppressed state;  - F/u blood cxs  - Check urine legionella Ag  - Antitussives, mucolytics, chest PT, and Duoneb  - Vital signs q4hrs  - Pt follows with Dr. Viveros - consult house Pulm in AM regarding further abd Sepsis due possible multifocal PNA vs CLL lung changes. Meets SIRS criteria.  Pt with h/o RLL PNA c/b parapneumonic effusion and LLL PNA.   - Treated empirically with 1 dose of Cipro iv, Vancomycin iv and Zosyn iv for now given immunosuppressed state;  - Pt follows with Dr. Viveros - consult house Pulm in AM regarding further Abx if any;  - F/u blood cxs  - Check urine legionella Ag  - Antitussives, mucolytics, chest PT, and Duoneb  - Vital signs q4hrs

## 2017-05-31 NOTE — H&P ADULT - NSHPPHYSICALEXAM_GEN_ALL_CORE
PHYSICAL EXAM:  GENERAL: No acute distress, frequent nonproductive but wet-sounding cough  EYES: EOMI, PERRLA, conjunctiva and sclera clear  NECK: supple, no JVD  CHEST/LUNG: bibasilar crackles, coarse breath sounds anteriorly, no wheezes  HEART: irregular; normal S1 and S2; no murmurs, rubs, or gallops  ABDOMEN: bowel sounds present, soft, non tender, non distended  EXTREMITIES:  1+ pitting b/l LE edema, no clubbing or cyanosis, 2+ DP pulses  PSYCH: AAOx3  NEUROLOGY: no focal deficits  SKIN: warm and dry, no rashes or lesions

## 2017-05-31 NOTE — PROGRESS NOTE ADULT - PROBLEM SELECTOR PLAN 2
R-sided pleural effusion seen on CXR. Recurrent. Appears to improved from prior imaging  - will monitor off antibiotics  - Likely residual from old multifocal pneumonia; showing some improvement  - Pt follows with Dr. Viveros.

## 2017-05-31 NOTE — CONSULT NOTE ADULT - PROBLEM SELECTOR RECOMMENDATION 9
Continue to monitor and replete magnesium and calcium.  Carefully monitor for evidence of pulmonary edema.  Holding lasix and giving IV NS.  Monitor LDH, haptglobin, phosphorus, and CMP for evidence of hemolysis, tumor lysis, or worsening KARRI.  Patient has poor functional status and is chronically ill. Continue to monitor and replete magnesium and calcium.  Endrocrinology workup for causes of hypocalcemia, including vitamin D, PTH.  Carefully monitor for evidence of pulmonary edema.  Holding lasix and giving IV NS.  Monitor LDH, haptglobin, phosphorus, and CMP for evidence of hemolysis, tumor lysis, or worsening KARRI.  Patient has poor functional status and is chronically ill. 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  Continue to monitor CBC.  No evidence of tumor lysis syndrome (normal uric acid and phosphorus), although patient has poor functional status and is chronically ill.  Continue to monitor and replete magnesium and calcium.  Endocrinology workup for causes of hypocalcemia, including vitamin D, PTH.  Consider holding protonix (associated with hypomagnesemia), unless benefits greater than risks.

## 2017-05-31 NOTE — H&P ADULT - NSHPOUTPATIENTPROVIDERS_GEN_ALL_CORE
Dr. Lux Mathew (Hematology); Dr. Marita Viveros (Pulmonology) Dr. Lux Mathew (Hematology);   Dr. Marita Viveros (Pulmonology)

## 2017-05-31 NOTE — H&P ADULT - ASSESSMENT
85 yo M with h/o CLL c/b relapsed warm AIHA s/p chemotherapy with rituximab, cyclophosphamide, and dexamethasone (last tx ~1 month ago), HTN, HLD, hypothyroidism, BPH, bronchiectasis, RLL PNA c/b parapneumonic effusion s/p CT and MIST II tx protocol, and recent admission (5/1-5/9/17) for LLL PNA presents after being referred to the ED by pt's pulmonologist after routine outpatient labs drawn on 5/25/17 revealed Ca 6.1, found to have hypocalcemia (Ca 6.70), hyperkalemia (K 5.6), hypomagnesemia (Mg 0.7), and KARRI, concerning for tumor lysis syndrome. 87 yo M with h/o CLL c/b relapsed warm AIHA s/p chemotherapy with rituximab, cyclophosphamide, and dexamethasone (last tx ~1 month ago), HTN, HLD, hypothyroidism, BPH, bronchiectasis, RLL PNA c/b parapneumonic effusion s/p CT and MIST II tx protocol, and recent admission (5/1-5/9/17) for LLL PNA presents after being referred to the ED by pt's pulmonologist after routine outpatient labs drawn on 5/25/17 revealed Ca 6.1, found to have hypocalcemia (Ca 6.70), hyperkalemia (K 5.6), hypomagnesemia (Mg 0.7), and KARRI, concerning for tumor lysis syndrome, and possible multifocal PNA. 85 yo Male with h/o CLL c/b relapsed warm AIHA s/p chemotherapy with rituximab, cyclophosphamide, and dexamethasone (last tx ~1 month ago), HTN, HLD, hypothyroidism, BPH, bronchiectasis, RLL PNA c/b parapneumonic effusion s/p CT and MIST II tx protocol, and recent admission (5/1-5/9/17) for LLL PNA a/w possible multifocal PNA vs CLL lung changes and sepsis vs SIRS, likely TLS in the setting of CLL, WBC>120K, c/b hyperkalemia and KARRI;

## 2017-05-31 NOTE — ED ADULT NURSE REASSESSMENT NOTE - NS ED NURSE REASSESS COMMENT FT1
rec'd pt. a&ox3, noted tachypneic, MD made aware. Magnesium Sulfate currently infusing via pump. IVF started as ordered. pt. c/o pain on RUQ (3/10), states tolerable at this time. MD made aware. awaits CXR results. will continue to monitor

## 2017-05-31 NOTE — H&P ADULT - NSHPREVIEWOFSYSTEMS_GEN_ALL_CORE
REVIEW OF SYSTEMS:    CONSTITUTIONAL: No fevers, no chills, +weakness  EYES/ENT: No visual changes;  No dizziness/vertigo or throat pain   NECK: No pain or stiffness  RESPIRATORY: No shortness of breath, no wheezing, +chronic cough  CARDIOVASCULAR: No chest pain or palpitations, no dyspnea on exertion  GASTROINTESTINAL: No nausea/vomiting/diarrhea, no abdominal pain, no melena or BRBPR, no constipation  GENITOURINARY: No dysuria, increased frequency or hematuria  NEUROLOGICAL: No numbness or weakness  SKIN: No itching, rashes, or lesions   All other review of systems is negative unless indicated above

## 2017-05-31 NOTE — ED ADULT NURSE REASSESSMENT NOTE - NS ED NURSE REASSESS COMMENT FT1
pt. awake, oriented x 3, with non-productive cough, noted NSR with PVC, diastolic 37, denies any pain. spoke to MD Williamson from Medicine team and made aware. MD to see pt. continuously monitored pt. awake, oriented x 3, with non-productive cough, noted NSR with PVC, diastolic 37, denies any pain, tachypneic, satting 98% on 2L of NC. spoke to MD Williamson from Medicine team and made aware. MD to see pt. continuously monitored

## 2017-05-31 NOTE — CONSULT NOTE ADULT - ASSESSMENT
86 yom, extensive PMH, including CLL and warm autoimmune hemolytic anemia, s/p treatment with rituximab, cyclophosphamide, and dexamethasone, recurrent hospital admissions, presents after being sent in for hypocalcemia.  Pt denies symptoms but, as per prior documentation, is stubborn and frequently denies complaints.

## 2017-05-31 NOTE — H&P ADULT - LYMPHATIC
posterior cervical R/anterior cervical L/posterior cervical L/supraclavicular R/anterior cervical R/supraclavicular L

## 2017-05-31 NOTE — H&P ADULT - NSHPSOCIALHISTORY_GEN_ALL_CORE
Tobacco: former smoker  EtOH: denies  Illicit drugs: denies  Lives at home with his wife. Ambulates with a rolling walker.

## 2017-05-31 NOTE — PROGRESS NOTE ADULT - PROBLEM SELECTOR PLAN 3
S/p RCD ~1 month ago. Follows with Dr. Lux Mathew as outpatient.  - f/u hematology recs  - will send type & screen; will transfuse for Hgb < 7

## 2017-05-31 NOTE — H&P ADULT - PROBLEM SELECTOR PLAN 3
R-sided pleural effusion seen on CXR. Recurrent. Can be parapneumonic or malignant.  - Unclear if pleural effusion is amenable to diuresis. Last time, pt's effusion was exudative, had MIST II protocol.  - Pt follows with Dr. Viveros. Consult house Pulm in AM.

## 2017-05-31 NOTE — H&P ADULT - PMH
Adult Hypothyroidism    Benign Prostatic Hypertrophy    CLL (chronic lymphocytic leukemia)    Hyperlipidemia    Hypertension    Pleural effusion

## 2017-05-31 NOTE — H&P ADULT - PROBLEM SELECTOR PLAN 1
Found to have hypocalcemia (Ca 6.70), hyperkalemia (K 5.6), hypomagnesemia (Mg 0.7). Also with elevated Cr from baseline. The combination of the electrolyte abnormalities with elevated Cr concerning for tumor lysis syndrome.  - Check tumor lysis labs   - Continue hydration with IVF, but need to be cautious given that pt seems fluid overloaded on exam  - C/w allopurinol  - Check CMP q12hrs and replete electrolytes PRN. S/p 2g magnesium sulfate and 1g calcium gluconate. Found to have hypocalcemia (Ca 6.70), hyperkalemia (K 5.6), hypomagnesemia (Mg 0.7). Also with elevated Cr from baseline. The combination of the electrolyte abnormalities with elevated Cr concerning for tumor lysis syndrome.  - Monitor tumor lysis labs   - Uric acid added on (pending)   - Continue hydration with IVF  - c/w allopurinol  - Check CMP q12hrs and replete electrolytes PRN. S/p 2g magnesium sulfate and 1g calcium gluconate.  -Heme/Onc c/s in AM (primary team)

## 2017-05-31 NOTE — PROGRESS NOTE ADULT - SUBJECTIVE AND OBJECTIVE BOX
Medicine R1 Accept Note    Patient is a 86y old  Male who presents with a chief complaint of Abnormal outpatient labs (31 May 2017 04:18)    HPI: 86 M w/ hx of CLL (c/b relapsed warm AIHA s/p chemotherapy with rituximab, cyclophosphamide, and dexamethasone (last tx ~1 month ago)), HTN, HLD, hypothyroidism, BPH, bronchiectasis, RLL PNA c/b parapneumonic effusion s/p CT and MIST II tx protocol, and recent admission (5/1-5/9/17) for LLL PNA presents after being referred to the ED by pt's pulmonologist after routine outpatient labs drawn on 5/25/17 revealed Ca 6.1. Pt endorses no acute health complaints and states that he does not know why he needs to be in the hospital. Pt denies any lethargy, confusion, numbness/tingling, or muscle spasms. Also denies any CP, SOB, F/C, runny nose, sore throat, lightheadedness/dizziness, N/V, diarrhea, constipation, or urinary symptoms. Pt states that he has a chronic cough, occasionally productive of white sputum, and baseline generalized weakness.    In the ED, T 98.9 ->67 -136 / 50-52 RR 18 O2 Sat 94% RA. CBC with .91, Hgb 8.2, Plt 120. CMP with K 5.6 (not hemolyzed), Ca 6.7, Mg 0.7, BUN 38, Cr 1.5 (baseline 0.9-1). T bili elevated at 2.3, alk phos 131, AST 48, ALT 28. Pt got 2 mg magnesium sulfate and 1 mg calcium gluconate. CXR showing lower and midlung opacities, best seen on the lateral view suggestive of multifocal pneumonia, so pt dosed with IV cipro, vanc, and zosyn. (31 May 2017 04:18)    PAST MEDICAL & SURGICAL HISTORY:  Pleural effusion  CLL (chronic lymphocytic leukemia)  Adult Hypothyroidism  Benign Prostatic Hypertrophy  Hypertension  Hyperlipidemia    Home Meds:  Allopurinol  Finasteride  Folic Acid  Furosemide  Lipitor  Norvasc  Protonix  Synthroid  Stiolto Respimat  Ventolin    PSH:  No significant past surgical history    SH: Denies EtOH, Former Smoker (per chart), Denies other drugs  FH: No significant family history    MEDICATIONS  (STANDING):  sodium chloride 0.9%. 1000milliLiter(s) IV Continuous <Continuous>  heparin  Injectable 5000Unit(s) SubCutaneous every 12 hours  ALBUTerol/ipratropium for Nebulization 3milliLiter(s) Nebulizer every 6 hours  finasteride 5milliGRAM(s) Oral daily  atorvastatin 20milliGRAM(s) Oral at bedtime  pantoprazole    Tablet 40milliGRAM(s) Oral before breakfast  levothyroxine 100MICROGram(s) Oral daily  folic acid 1milliGRAM(s) Oral daily  allopurinol 300milliGRAM(s) Oral daily  magnesium sulfate  IVPB 1Gram(s) IV Intermittent once    MEDICATIONS  (PRN):  guaiFENesin    Syrup 200milliGRAM(s) Oral every 6 hours PRN Cough      Vital Signs Last 24 Hrs  T(C): 36.7, Max: 37.3 (05-31 @ 01:04)  HR: 77 (67 - 106)  BP: 124/48 (118/43 - 136/50)  RR: 17 (17 - 27)  SpO2: 93% (93% - 98%)  Wt(kg): --  CAPILLARY BLOOD GLUCOSE    I&O's Summary    I & Os for current day (as of 31 May 2017 09:05)  =============================================  IN: 600 ml / OUT: 80 ml / NET: 520 ml      PHYSICAL EXAM:  GENERAL: NAD, well-developed  HEAD:  Atraumatic, Normocephalic  EYES: EOMI, PERRLA, conjunctiva and sclera clear  NECK: Supple, No JVD  CHEST/LUNG: Clear to auscultation bilaterally; No wheeze  HEART: Regular rate and rhythm; No murmurs, rubs, or gallops  ABDOMEN: Soft, Nontender, Nondistended; Bowel sounds present  EXTREMITIES:  2+ Peripheral Pulses, No clubbing, cyanosis, or edema  PSYCH: AAOx3  NEUROLOGY: non-focal  SKIN: No rashes or lesions    LABS:                        7.0    80.81 )-----------( 95       ( 31 May 2017 08:05 )             23.4     05-31    140  |  102  |  35<H>  ----------------------------<  119<H>  4.0   |  19<L>  |  1.27    Ca    6.7<L>      31 May 2017 08:05  Phos  3.4     05-31  Mg     1.5     05-31    TPro  5.5<L>  /  Alb  3.8  /  TBili  2.8<H>  /  DBili  x   /  AST  29  /  ALT  20  /  AlkPhos  103  05-31              RADIOLOGY & ADDITIONAL TESTS:    Imaging Personally Reviewed:    Consultant(s) Notes Reviewed:      Care Discussed with Consultants/Other Providers: Medicine R1 Accept Note    Patient is a 86y old  Male who presents with a chief complaint of Abnormal outpatient labs (31 May 2017 04:18)    HPI: 86 M w/ hx of CLL (c/b relapsed warm AIHA s/p chemotherapy with rituximab, cyclophosphamide, and dexamethasone (last tx ~1 month ago)), HTN, HLD, hypothyroidism, BPH, bronchiectasis, RLL PNA c/b parapneumonic effusion s/p CT and MIST II tx protocol, and recent admission (5/1-5/9/17) for LLL PNA presents after being referred to the ED by pt's pulmonologist after routine outpatient labs drawn on 5/25/17 revealed Ca 6.1. Pt endorses no acute health complaints and states that he does not know why he needs to be in the hospital. Pt denies any lethargy, confusion, numbness/tingling, or muscle spasms. Also denies any CP, SOB, F/C, runny nose, sore throat, lightheadedness/dizziness, N/V, diarrhea, constipation, or urinary symptoms. Pt states that he has a chronic cough, occasionally productive of white sputum, and baseline generalized weakness.    In the ED, T 98.9 ->67 -136 / 50-52 RR 18 O2 Sat 94% RA. CBC with .91, Hgb 8.2, Plt 120. CMP with K 5.6 (not hemolyzed), Ca 6.7, Mg 0.7, BUN 38, Cr 1.5 (baseline 0.9-1). T bili elevated at 2.3, alk phos 131, AST 48, ALT 28. Pt got 2 mg magnesium sulfate and 1 mg calcium gluconate. CXR showing lower and midlung opacities, best seen on the lateral view suggestive of multifocal pneumonia, so pt dosed with IV cipro, vanc, and zosyn. (31 May 2017 04:18)    When seen this morning, the patient endorsed the above and denied any complaints. He said the only new change in health status is that his cough returned over the last day but has been nonproductive. Per outpatient notes, it does appear that cough has been chronic. Otherwise, patient denies any malaise, weakness, fevers, chills, chest pain, SOB, abdominal pain, nausea, vomiting, diarrhea.     PAST MEDICAL & SURGICAL HISTORY:  Pleural effusion  CLL (chronic lymphocytic leukemia)  Adult Hypothyroidism  Benign Prostatic Hypertrophy  Hypertension  Hyperlipidemia    Home Meds:  Allopurinol  Finasteride  Folic Acid  Furosemide  Lipitor  Norvasc  Protonix  Synthroid  Stiolto Respimat  Ventolin    PSH:  No significant past surgical history    SH: Denies EtOH, Former Smoker (per chart), Denies other drugs  FH: No significant family history    MEDICATIONS  (STANDING):  sodium chloride 0.9%. 1000milliLiter(s) IV Continuous <Continuous>  heparin  Injectable 5000Unit(s) SubCutaneous every 12 hours  ALBUTerol/ipratropium for Nebulization 3milliLiter(s) Nebulizer every 6 hours  finasteride 5milliGRAM(s) Oral daily  atorvastatin 20milliGRAM(s) Oral at bedtime  pantoprazole    Tablet 40milliGRAM(s) Oral before breakfast  levothyroxine 100MICROGram(s) Oral daily  folic acid 1milliGRAM(s) Oral daily  allopurinol 300milliGRAM(s) Oral daily  magnesium sulfate  IVPB 1Gram(s) IV Intermittent once    MEDICATIONS  (PRN):  guaiFENesin    Syrup 200milliGRAM(s) Oral every 6 hours PRN Cough      Vital Signs Last 24 Hrs  T(C): 36.7, Max: 37.3 (05-31 @ 01:04)  HR: 77 (67 - 106)  BP: 124/48 (118/43 - 136/50)  RR: 17 (17 - 27)  SpO2: 93% (93% - 98%)  Wt(kg): --  CAPILLARY BLOOD GLUCOSE    I&O's Summary    I & Os for current day (as of 31 May 2017 09:05)  =============================================  IN: 600 ml / OUT: 80 ml / NET: 520 ml      PHYSICAL EXAM:  GENERAL: NAD, frail appearing  HEAD:  Atraumatic, Normocephalic  EYES: PERRL, conjunctiva and sclera clear  NECK: Supple, No JVD  CHEST/LUNG: Bibasilar crackles; diffuse rhonchi  HEART: Regular rate and rhythm; No murmurs, rubs, or gallops  ABDOMEN: Soft, Nontender, Nondistended; Bowel sounds present  EXTREMITIES:  2+ Peripheral Pulses, No clubbing, cyanosis, or edema  PSYCH: AAOx3  NEUROLOGY: non-focal  SKIN: No rashes or lesions    LABS:                        7.0    80.81 )-----------( 95       ( 31 May 2017 08:05 )             23.4     05-31    140  |  102  |  35<H>  ----------------------------<  119<H>  4.0   |  19<L>  |  1.27    Ca    6.7<L>      31 May 2017 08:05  Phos  3.4     05-31  Mg     1.5     05-31    TPro  5.5<L>  /  Alb  3.8  /  TBili  2.8<H>  /  DBili  x   /  AST  29  /  ALT  20  /  AlkPhos  103  05-31              RADIOLOGY & ADDITIONAL TESTS:    Imaging Personally Reviewed:    Consultant(s) Notes Reviewed:      Care Discussed with Consultants/Other Providers:

## 2017-05-31 NOTE — H&P ADULT - NSHPLABSRESULTS_GEN_ALL_CORE
8.2    120.91 )-----------( 120      ( 30 May 2017 22:30 )             26.7     05-30    143  |  102  |  38<H>  ----------------------------<  132<H>  5.6<H>   |  19<L>  |  1.50<H>    Ca    6.7<L>      30 May 2017 22:30  Phos  3.7     05-30  Mg     0.7     05-30    TPro  6.2  /  Alb  4.3  /  TBili  2.3<H>  /  DBili  x   /  AST  48<H>  /  ALT  28  /  AlkPhos  131<H>  05-30    CXR: Multiple pulm infiltrates    EKG: afib @ 90 bpm with PVCs, no acute ST-T wave changes 8.2    120.91 )-----------( 120      ( 30 May 2017 22:30 )             26.7     05-30    143  |  102  |  38<H>  ----------------------------<  132<H>  5.6<H>   |  19<L>  |  1.50<H>    Ca    6.7<L>      30 May 2017 22:30  Phos  3.7     05-30  Mg     0.7     05-30    TPro  6.2  /  Alb  4.3  /  TBili  2.3<H>  /  DBili  x   /  AST  48<H>  /  ALT  28  /  AlkPhos  131<H>  05-30    CXR: Lower and midlung opacities, best seen on the lateral view suggestive of multifocal pneumonia.    EKG: afib @ 90 bpm with PVCs, no acute ST-T wave changes 8.2    120.91 )-----------( 120      ( 30 May 2017 22:30 )             26.7     05-30    143  |  102  |  38<H>  ----------------------------<  132<H>  5.6<H>   |  19<L>  |  1.50<H>    Ca    6.7<L>      30 May 2017 22:30  Phos  3.7     05-30  Mg     0.7     05-30    TPro  6.2  /  Alb  4.3  /  TBili  2.3<H>  /  DBili  x   /  AST  48<H>  /  ALT  28  /  AlkPhos  131<H>  05-30    CXR: Lower and midlung opacities, best seen on the lateral view suggestive of multifocal pneumonia.    EKG:  sinus tachycardia, 101bpm, qtc 487, 1st deg AV block, PVCs, no acute ST or T wave changes - my reading 8.2    120.91 )-----------( 120      ( 30 May 2017 22:30 )             26.7     05-30    143  |  102  |  38<H>  ----------------------------<  132<H>  5.6<H>   |  19<L>  |  1.50<H>    Ca    6.7<L>      30 May 2017 22:30  Phos  3.7     05-30  Mg     0.7     05-30    TPro  6.2  /  Alb  4.3  /  TBili  2.3<H>  /  DBili  x   /  AST  48<H>  /  ALT  28  /  AlkPhos  131<H>  05-30    CXR: Lower and midlung opacities, best seen on the lateral view suggestive of multifocal pneumonia.    EKG:  sinus tachycardia, 101bpm, qtc 487, 1st deg AV block, PVCs, no acute ST or T wave changes - my reading    CT chest 5/2/17:  1.    Left  lower  lobe  consolidation  representing  passive  atelectasis  with  or  without  superimposed  pneumonia.  Diffuse  tree-in-bud  nodules  offer  further  evidence  of  the  presence  of  infection.  2.    Small  bilateral  pleural  effusions,  larger  on  the  left.  3.    Stable  adenopathy  in  the  chest,  consistent  with  known  CLL.

## 2017-05-31 NOTE — H&P ADULT - HISTORY OF PRESENT ILLNESS
85 yo M with h/o CLL c/b relapsed warm AIHA s/p chemotherapy with rituximab, cyclophosphamide, and dexamethasone (last tx ~1 month ago), HTN, HLD, hypothyroidism, BPH, bronchiectasis, RLL PNA c/b parapneumonic effusion s/p CT and MIST II tx protocol, and recent admission (5/1-5/9/17) for LLL PNA presents after being referred to the ED by pt's pulmonologist after routine outpatient labs drawn on 5/25/17 revealed Ca 6.1. 87 yo M with h/o CLL c/b relapsed warm AIHA s/p chemotherapy with rituximab, cyclophosphamide, and dexamethasone (last tx ~1 month ago), HTN, HLD, hypothyroidism, BPH, bronchiectasis, RLL PNA c/b parapneumonic effusion s/p CT and MIST II tx protocol, and recent admission (5/1-5/9/17) for LLL PNA presents after being referred to the ED by pt's pulmonologist after routine outpatient labs drawn on 5/25/17 revealed Ca 6.1. Pt endorses no acute health complaints and states that he does not know why he needs to be in the hospital. Pt denies any lethargy, confusion, numbness/tingling, or muscle spasms. Also denies any CP, SOB, F/C, runny nose, sore throat, lightheadedness/dizziness, N/V, diarrhea, constipation, or urinary symptoms. Pt states that he has a chronic cough, occasionally productive of white sputum, and baseline generalized weakness.    In the ED, T 98.9 ->67 -136 / 50-52 RR 18 O2 Sat 94% RA. CBC with .91, Hgb 8.2, Plt 120. CMP with K 5.6 (not hemolyzed), Ca 6.7, Mg 0.7, BUN 38, Cr 1.5 (baseline 0.9-1). T bili elevated at 2.3, alk phos 131, AST 48, ALT 28. Pt got 2 mg magnesium sulfate and 1 mg calcium gluconate. 85 yo M with h/o CLL c/b relapsed warm AIHA s/p chemotherapy with rituximab, cyclophosphamide, and dexamethasone (last tx ~1 month ago), HTN, HLD, hypothyroidism, BPH, bronchiectasis, RLL PNA c/b parapneumonic effusion s/p CT and MIST II tx protocol, and recent admission (5/1-5/9/17) for LLL PNA presents after being referred to the ED by pt's pulmonologist after routine outpatient labs drawn on 5/25/17 revealed Ca 6.1. Pt endorses no acute health complaints and states that he does not know why he needs to be in the hospital. Pt denies any lethargy, confusion, numbness/tingling, or muscle spasms. Also denies any CP, SOB, F/C, runny nose, sore throat, lightheadedness/dizziness, N/V, diarrhea, constipation, or urinary symptoms. Pt states that he has a chronic cough, occasionally productive of white sputum, and baseline generalized weakness.    In the ED, T 98.9 ->67 -136 / 50-52 RR 18 O2 Sat 94% RA. CBC with .91, Hgb 8.2, Plt 120. CMP with K 5.6 (not hemolyzed), Ca 6.7, Mg 0.7, BUN 38, Cr 1.5 (baseline 0.9-1). T bili elevated at 2.3, alk phos 131, AST 48, ALT 28. Pt got 2 mg magnesium sulfate and 1 mg calcium gluconate. CXR showing lower and midlung opacities, best seen on the lateral view suggestive of multifocal pneumonia, so pt dosed with IV cipro, vanc, and zosyn. 87 yo Male with h/o CLL c/b relapsed warm AIHA s/p chemotherapy with rituximab, cyclophosphamide, and dexamethasone (last tx ~1 month ago), HTN, HLD, hypothyroidism, BPH, bronchiectasis, RLL PNA c/b parapneumonic effusion s/p CT and MIST II tx protocol, and recent admission (5/1-5/9/17) for LLL PNA presents after being referred to the ED by pt's pulmonologist after routine outpatient labs drawn on 5/25/17 revealed Ca 6.1. Pt endorses no acute health complaints and states that he does not know why he needs to be in the hospital. Pt denies any lethargy, confusion, numbness/tingling, or muscle spasms. Also denies any CP, SOB, F/C, runny nose, sore throat, lightheadedness/dizziness, N/V, diarrhea, constipation, or urinary symptoms. Pt states that he has a chronic cough, occasionally productive of white sputum, and baseline generalized weakness.    In the ED, T 98.9 ->67 -136 / 50-52 RR 18 O2 Sat 94% RA. CBC with .91, Hgb 8.2, Plt 120. CMP with K 5.6 (not hemolyzed), Ca 6.7, Mg 0.7, BUN 38, Cr 1.5 (baseline 0.9-1). T bili elevated at 2.3, alk phos 131, AST 48, ALT 28. Pt got 2 mg magnesium sulfate and 1 mg calcium gluconate. CXR showing lower and midlung opacities, best seen on the lateral view suggestive of multifocal pneumonia, so pt dosed with IV cipro, vanc, and zosyn.

## 2017-05-31 NOTE — CONSULT NOTE ADULT - SUBJECTIVE AND OBJECTIVE BOX
86 yom, extensive PMH, including bronchiestasis, recurrent PNAs, including RLL PNA complicated by parapneumonic effusion, CLL and warm autoimmune hemolytic anemia s/p rituximab, cyclophosphamide, and dexamethasone-last treated about 1 month prior to presentation, and five hospitalizations in 2017 (last admitted 5/1-5/0 for LLL PNA).  Pt's pulmonologist ordered routine outpatients labs, and patient was found to have calcium of 6.1 with normal albumin, so told to come to ED.  At the time of admission, pt denied any complaints and did not want to be in the hospital.  There was concern for tumor lysis syndrome, secondary to potassium of 5.6, low calcium, magnesium 0.7, and KARRI with creatinine 1.5 (baseline 0.9-1).  However, phosphorus was 3.7, and uric acid 5.1.  , haptoglobin 193, and total bilirubin 2.3.  Pt found to have leukocytosis with lymphocyte predominance anemia, and thrombocytopenia (baseline and expected).  S/p magnesium sulfate and calcium gluconate repletion in ED.    At time of examination, pt only complaining of chronic productive cough.  However, as per outpatient documentation from pt's pulmonologist, patient is stubborn and always denies new complaints.    PMH:  CLL, warm autoimmune hemolytic anemia  Bronchiectasis, Recurrent PNAs, complicated by RLL parapneumonic effusion  HTN  HLD  Hypothyroidism  BPH    Home meds:  Ventolin  Lasix 40 q day  Protonix  Synthroid 100  Folic acid  Finasteride  Stiolto  Lipitor 20  Norvasc 100  Allopurinol 300 q day    Allergies:  Seafood, NKDA    Social hx:  Former smoker, lives at home with wife, who is full time caregiver.  Ambulates with walker. Uses oxygen, 2L NC.    Physical exam:  Vital signs:  Temperature: 98.1  BP: 124/48  Pulse: 77  Respiration: 18  Pulse ox: 93% supplemental oxygen    General: NAD, appears comfortable, prefers for wife to answer questions on his behalf  HEENT: NC/AT, no JVD  Heart: +S1, S2, RRR  Lungs: +B/L crackles (wife states chronic), not in respiratory distress  Abdomen: soft, NT/ND, +BS  Extremities: no edema, no ecchymoses/petechiae  Neuro: AAO x 3 but prefers wife to speak on his behalf

## 2017-05-31 NOTE — PROGRESS NOTE ADULT - ASSESSMENT
87 yo Male with h/o CLL c/b relapsed warm AIHA s/p chemotherapy with rituximab, cyclophosphamide, and dexamethasone (last tx ~1 month ago), HTN, HLD, hypothyroidism, BPH, bronchiectasis, RLL PNA c/b parapneumonic effusion s/p CT and MIST II tx protocol, and recent admission (5/1-5/9/17) for LLL PNA who is presenting with hypocalcemia w/ unclear etiology; possibly due to vitamin D deficiency.

## 2017-06-01 LAB
24R-OH-CALCIDIOL SERPL-MCNC: 8.4 NG/ML — LOW (ref 30–100)
BASOPHILS # BLD AUTO: 0.1 K/UL — SIGNIFICANT CHANGE UP (ref 0–0.2)
BASOPHILS NFR BLD AUTO: 0.1 % — SIGNIFICANT CHANGE UP (ref 0–2)
BUN SERPL-MCNC: 26 MG/DL — HIGH (ref 7–23)
CA-I BLD-SCNC: 0.98 MMOL/L — LOW (ref 1.03–1.23)
CALCIUM SERPL-MCNC: 6.9 MG/DL — LOW (ref 8.4–10.5)
CHLORIDE SERPL-SCNC: 106 MMOL/L — SIGNIFICANT CHANGE UP (ref 98–107)
CO2 SERPL-SCNC: 19 MMOL/L — LOW (ref 22–31)
CREAT SERPL-MCNC: 1.12 MG/DL — SIGNIFICANT CHANGE UP (ref 0.5–1.3)
EOSINOPHIL # BLD AUTO: 0.02 K/UL — SIGNIFICANT CHANGE UP (ref 0–0.5)
EOSINOPHIL NFR BLD AUTO: 0 % — SIGNIFICANT CHANGE UP (ref 0–6)
GLUCOSE SERPL-MCNC: 131 MG/DL — HIGH (ref 70–99)
HAPTOGLOB SERPL-MCNC: 236 MG/DL — HIGH (ref 34–200)
HCT VFR BLD CALC: 22 % — LOW (ref 39–50)
HGB BLD-MCNC: 6.5 G/DL — CRITICAL LOW (ref 13–17)
IMM GRANULOCYTES NFR BLD AUTO: 0.5 % — SIGNIFICANT CHANGE UP (ref 0–1.5)
LDH SERPL L TO P-CCNC: 323 U/L — HIGH (ref 135–225)
LYMPHOCYTES # BLD AUTO: 62.34 K/UL — HIGH (ref 1–3.3)
LYMPHOCYTES # BLD AUTO: 86.5 % — HIGH (ref 13–44)
MAGNESIUM SERPL-MCNC: 1.8 MG/DL — SIGNIFICANT CHANGE UP (ref 1.6–2.6)
MANUAL SMEAR VERIFICATION: SIGNIFICANT CHANGE UP
MCHC RBC-ENTMCNC: 29.5 % — LOW (ref 32–36)
MCHC RBC-ENTMCNC: 32 PG — SIGNIFICANT CHANGE UP (ref 27–34)
MCV RBC AUTO: 108.4 FL — HIGH (ref 80–100)
MONOCYTES # BLD AUTO: 0.96 K/UL — HIGH (ref 0–0.9)
MONOCYTES NFR BLD AUTO: 1.3 % — LOW (ref 2–14)
NEUTROPHILS # BLD AUTO: 8.27 K/UL — HIGH (ref 1.8–7.4)
NEUTROPHILS NFR BLD AUTO: 11.6 % — LOW (ref 43–77)
PHOSPHATE SERPL-MCNC: 3.4 MG/DL — SIGNIFICANT CHANGE UP (ref 2.5–4.5)
PLATELET # BLD AUTO: 86 K/UL — LOW (ref 150–400)
PMV BLD: 9.8 FL — SIGNIFICANT CHANGE UP (ref 7–13)
POTASSIUM SERPL-MCNC: 4.2 MMOL/L — SIGNIFICANT CHANGE UP (ref 3.5–5.3)
POTASSIUM SERPL-SCNC: 4.2 MMOL/L — SIGNIFICANT CHANGE UP (ref 3.5–5.3)
RBC # BLD: 2.03 M/UL — LOW (ref 4.2–5.8)
RBC # FLD: 17.1 % — HIGH (ref 10.3–14.5)
SODIUM SERPL-SCNC: 142 MMOL/L — SIGNIFICANT CHANGE UP (ref 135–145)
SPECIMEN SOURCE: SIGNIFICANT CHANGE UP
SPECIMEN SOURCE: SIGNIFICANT CHANGE UP
URATE SERPL-MCNC: 4.1 MG/DL — SIGNIFICANT CHANGE UP (ref 3.4–8.8)
WBC # BLD: 72.07 K/UL — CRITICAL HIGH (ref 3.8–10.5)
WBC # FLD AUTO: 72.07 K/UL — CRITICAL HIGH (ref 3.8–10.5)

## 2017-06-01 PROCEDURE — 86079 PHYS BLOOD BANK SERV AUTHRJ: CPT

## 2017-06-01 PROCEDURE — 71010: CPT | Mod: 26,76

## 2017-06-01 PROCEDURE — 99291 CRITICAL CARE FIRST HOUR: CPT

## 2017-06-01 PROCEDURE — 99232 SBSQ HOSP IP/OBS MODERATE 35: CPT

## 2017-06-01 RX ORDER — ERGOCALCIFEROL 1.25 MG/1
50000 CAPSULE ORAL
Qty: 0 | Refills: 0 | Status: DISCONTINUED | OUTPATIENT
Start: 2017-06-02 | End: 2017-06-12

## 2017-06-01 RX ORDER — IPRATROPIUM/ALBUTEROL SULFATE 18-103MCG
3 AEROSOL WITH ADAPTER (GRAM) INHALATION ONCE
Qty: 0 | Refills: 0 | Status: COMPLETED | OUTPATIENT
Start: 2017-06-01 | End: 2017-06-01

## 2017-06-01 RX ORDER — FUROSEMIDE 40 MG
40 TABLET ORAL ONCE
Qty: 0 | Refills: 0 | Status: COMPLETED | OUTPATIENT
Start: 2017-06-01 | End: 2017-06-01

## 2017-06-01 RX ORDER — AMLODIPINE BESYLATE 2.5 MG/1
10 TABLET ORAL DAILY
Qty: 0 | Refills: 0 | Status: DISCONTINUED | OUTPATIENT
Start: 2017-06-01 | End: 2017-06-01

## 2017-06-01 RX ORDER — CALCIUM GLUCONATE 100 MG/ML
1 VIAL (ML) INTRAVENOUS
Qty: 0 | Refills: 0 | Status: DISCONTINUED | OUTPATIENT
Start: 2017-06-01 | End: 2017-06-10

## 2017-06-01 RX ORDER — CALCIUM GLUCONATE 100 MG/ML
1 VIAL (ML) INTRAVENOUS ONCE
Qty: 0 | Refills: 0 | Status: COMPLETED | OUTPATIENT
Start: 2017-06-01 | End: 2017-06-01

## 2017-06-01 RX ORDER — FUROSEMIDE 40 MG
20 TABLET ORAL ONCE
Qty: 0 | Refills: 0 | Status: DISCONTINUED | OUTPATIENT
Start: 2017-06-01 | End: 2017-06-01

## 2017-06-01 RX ORDER — AMLODIPINE BESYLATE 2.5 MG/1
10 TABLET ORAL DAILY
Qty: 0 | Refills: 0 | Status: DISCONTINUED | OUTPATIENT
Start: 2017-06-02 | End: 2017-06-12

## 2017-06-01 RX ADMIN — HEPARIN SODIUM 5000 UNIT(S): 5000 INJECTION INTRAVENOUS; SUBCUTANEOUS at 06:35

## 2017-06-01 RX ADMIN — Medication 1 TABLET(S): at 06:35

## 2017-06-01 RX ADMIN — Medication 100 MICROGRAM(S): at 06:35

## 2017-06-01 RX ADMIN — HEPARIN SODIUM 5000 UNIT(S): 5000 INJECTION INTRAVENOUS; SUBCUTANEOUS at 17:49

## 2017-06-01 RX ADMIN — FINASTERIDE 5 MILLIGRAM(S): 5 TABLET, FILM COATED ORAL at 12:00

## 2017-06-01 RX ADMIN — Medication 100 GRAM(S): at 11:02

## 2017-06-01 RX ADMIN — Medication 40 MILLIGRAM(S): at 15:05

## 2017-06-01 RX ADMIN — Medication 3 MILLILITER(S): at 10:12

## 2017-06-01 RX ADMIN — Medication 3 MILLILITER(S): at 02:53

## 2017-06-01 RX ADMIN — Medication 3 MILLILITER(S): at 23:12

## 2017-06-01 RX ADMIN — Medication 3 MILLILITER(S): at 15:05

## 2017-06-01 RX ADMIN — Medication 100 GRAM(S): at 00:29

## 2017-06-01 RX ADMIN — Medication 300 MILLIGRAM(S): at 12:00

## 2017-06-01 RX ADMIN — ATORVASTATIN CALCIUM 20 MILLIGRAM(S): 80 TABLET, FILM COATED ORAL at 22:09

## 2017-06-01 RX ADMIN — Medication 1 MILLIGRAM(S): at 12:00

## 2017-06-01 RX ADMIN — PANTOPRAZOLE SODIUM 40 MILLIGRAM(S): 20 TABLET, DELAYED RELEASE ORAL at 06:35

## 2017-06-01 NOTE — DISCHARGE NOTE ADULT - MEDICATION SUMMARY - MEDICATIONS TO STOP TAKING
I will STOP taking the medications listed below when I get home from the hospital:    furosemide 40 mg oral tablet  -- 1 tab(s) by mouth once a day

## 2017-06-01 NOTE — DISCHARGE NOTE ADULT - HOME CARE AGENCY
[facility] [phone] Nurse and Physical therapist to visit on the day following discharge. Other appropriate services to be arranged thereafter. [facility]

## 2017-06-01 NOTE — DISCHARGE NOTE ADULT - CARE PROVIDER_API CALL
Anthony Smith), Internal Medicine  [address] [place] [address]  Phone: [phone]  Fax: [phone]    Marita Viveros), Critical Care Medicine; Internal Medicine; Pulmonary Disease; Sleep Medicine  [address] [place] [id number]  Phone: [phone]  Fax: [phone]

## 2017-06-01 NOTE — PROGRESS NOTE ADULT - SUBJECTIVE AND OBJECTIVE BOX
MICU ACCEPT NOTE  CHIEF COMPLAINT: Patient is a 86y old  Male who presents with a chief complaint of Abnormal outpatient labs (01 Jun 2017 14:45)      Interval Events: Patient accepted to MICU after SOB after blood transfusion given at 11:30 am today requiring bipap.CXR c/w pulmonary edema--seen with acc. muscle use and tachypnea, gross crackles on exam.     REVIEW OF SYSTEMS:  Constitutional: neg  Eyes:neg  ENT:neg  CV:neg  Resp: sob   GI: no nausea, vomiting   :  MSK: no pain   Integumentary:  Neurological: no syncope gen weakness  Psychiatric: calm, cooperative  Endocrine:  Hematologic/Lymphatic: no bleeding  Allergic/Immunologic: no allergies  [x ] All other systems negative  [ ] Unable to assess ROS because ________    OBJECTIVE:  ICU Vital Signs Last 24 Hrs  T(C): 36.7, Max: 37.6 (05-31 @ 21:27)  T(F): 98, Max: 99.7 (05-31 @ 21:27)  HR: 86 (73 - 95)  BP: 197/70 (114/80 - 197/70)  BP(mean): --  ABP: --  ABP(mean): --  RR: 30 (18 - 34)  SpO2: 95% (88% - 98%)        I & Os for current day (as of 06-01 @ 17:08)  =============================================  IN: 200 ml / OUT: 500 ml / NET: -300 ml    CAPILLARY BLOOD GLUCOSE      PHYSICAL EXAM:  General: NAD  HEENT: no lad, mild icterus, no mass  Lymph Nodes:no lad  Neck: no mass  Respiratory: + acc muscle use, crackles b/l lungs to mid lung fields  Cardiovascular: S1,s2 no murmurs  Abdomen: soft, nontender  Extremities: no edema  Skin: no rash   Neurological:aox3  Psychiatry:[facility] MEDICATIONS:  MEDICATIONS  (STANDING):  heparin  Injectable 5000Unit(s) SubCutaneous every 12 hours  ALBUTerol/ipratropium for Nebulization 3milliLiter(s) Nebulizer every 6 hours  finasteride 5milliGRAM(s) Oral daily  atorvastatin 20milliGRAM(s) Oral at bedtime  levothyroxine 100MICROGram(s) Oral daily  folic acid 1milliGRAM(s) Oral daily  allopurinol 300milliGRAM(s) Oral daily  calcium carbonate 1250 mG + Vitamin D (OsCal 500 + D) 1Tablet(s) Oral two times a day  calcium gluconate IVPB 1Gram(s) IV Intermittent every 1 hour    MEDICATIONS  (PRN):  guaiFENesin    Syrup 200milliGRAM(s) Oral every 6 hours PRN Cough      LABS:  (06-01 @ 05:59)                        6.5  72.07 )-----------( 86                 22.0    Neutrophils = 8.27 (11.6%)  Lymphocytes = 62.34 (86.5%)  Eosinophils = 0.02 (0.0%)  Basophils = 0.10 (0.1%)  Monocytes = 0.96 (1.3%)  Bands = --%    WBC Trend: 72.07<--, 78.33<--, 80.81<--  Hb Trend: 6.5<--, 7.0<--, 7.0<--, 8.2<--  Plt Trend: 86<--, 63<--, 95<--, 120<--  06-01    142  |  106  |  26<H>  ----------------------------<  131<H>  4.2   |  19<L>  |  1.12    Ca    6.9<L>      01 Jun 2017 05:59  Phos  3.4     06-01  Mg     1.8     06-01    TPro  5.5<L>  /  Alb  3.8  /  TBili  2.8<H>  /  DBili  x   /  AST  29  /  ALT  20  /  AlkPhos  103  05-31    Creatinine Trend: 1.12<--, 1.24<--, 1.27<--, 1.50<--, 1.07<--, 1.01<--        Venous Blood Gas:  05-31 @ 08:05  7.38/38/96/23/98.4  VBG Lactate: 1.1          RADIOLOGY:  X Ray:reviewed by me--pulm edema compared to yesterday       EKG: NSR w first deg av block     A/P:   Neuro: no issues  Pulm: b/l effusions, ? consolidation and pulm edema in setting of volume overload--cw/ lasix 40 iv daily, place zimmer if no improvement in urine output   - cw bipap--taper off as tolerated  - consider abx coverage if spikes   CV: HTN--cw BP meds--convert to iv while on bipap   GI: no acute issues  Renal: no acute issues  ID: no acute issues--consider pna--will check lung us--may all be pulm edema, atelectasis   Heme: CLL, warm AIHA--no gross hemolysis, sp 1U pRBCs today--monitor hb and hemolysis labs. c/w heme fu   DVT ppx--SCDs

## 2017-06-01 NOTE — DISCHARGE NOTE ADULT - PATIENT PORTAL LINK FT
“You can access the [employer] Patient Portal, offered by [facility], by registering with the following website: [email or web address]”

## 2017-06-01 NOTE — PROGRESS NOTE ADULT - SUBJECTIVE AND OBJECTIVE BOX
Medicine R1 Progress Note    Patient is a 86y old  Male who presents with a chief complaint of hypocalcemia (31 May 2017 04:18)    SUBJECTIVE / OVERNIGHT EVENTS:  Patient with subjective shortness of breath overnight which was improved this morning. Patient with no complaints when seen this morning. Patient denies any fevers, chills, chest pain, abdominal pain, nausea, vomiting, diarrhea.     MEDICATIONS  (STANDING):  heparin  Injectable 5000Unit(s) SubCutaneous every 12 hours  ALBUTerol/ipratropium for Nebulization 3milliLiter(s) Nebulizer every 6 hours  finasteride 5milliGRAM(s) Oral daily  atorvastatin 20milliGRAM(s) Oral at bedtime  levothyroxine 100MICROGram(s) Oral daily  folic acid 1milliGRAM(s) Oral daily  allopurinol 300milliGRAM(s) Oral daily  calcium carbonate 1250 mG + Vitamin D (OsCal 500 + D) 1Tablet(s) Oral two times a day  calcium gluconate IVPB 1Gram(s) IV Intermittent every 1 hour    MEDICATIONS  (PRN):  guaiFENesin    Syrup 200milliGRAM(s) Oral every 6 hours PRN Cough      Vital Signs Last 24 Hrs  T(C): 37.3, Max: 37.6 (05-31 @ 21:27)  HR: 88 (73 - 95)  BP: 136/83 (114/80 - 151/55)  RR: 18 (18 - 34)  SpO2: 98% (88% - 98%)  Wt(kg): --  CAPILLARY BLOOD GLUCOSE    I&O's Summary    I & Os for current day (as of 01 Jun 2017 14:29)  =============================================  IN: 200 ml / OUT: 500 ml / NET: -300 ml      PHYSICAL EXAM:  GENERAL: NAD, well-developed; weak appearing  HEAD:  Atraumatic, Normocephalic  EYES: EOMI, PERRL, conjunctiva and sclera clear  NECK: Supple, No JVD  CHEST/LUNG: Faint bibasilar rales  HEART: Regular rate and rhythm;   ABDOMEN: Soft, Nontender, Nondistended; Bowel sounds present  EXTREMITIES:  2+ Peripheral Pulses, No clubbing, cyanosis, or edema  PSYCH: AAOx3  NEUROLOGY: non-focal  SKIN: No rashes or lesions    LABS:                        6.5    72.07 )-----------( 86       ( 01 Jun 2017 05:59 )             22.0     06-01    142  |  106  |  26<H>  ----------------------------<  131<H>  4.2   |  19<L>  |  1.12    Ca    6.9<L>      01 Jun 2017 05:59  Phos  3.4     06-01  Mg     1.8     06-01    TPro  5.5<L>  /  Alb  3.8  /  TBili  2.8<H>  /  DBili  x   /  AST  29  /  ALT  20  /  AlkPhos  103  05-31              RADIOLOGY & ADDITIONAL TESTS:    Imaging Personally Reviewed:    Consultant(s) Notes Reviewed:      Care Discussed with Consultants/Other Providers:

## 2017-06-01 NOTE — DISCHARGE NOTE ADULT - CARE PROVIDERS DIRECT ADDRESSES
[email or web address]

## 2017-06-01 NOTE — DISCHARGE NOTE ADULT - MEDICATION SUMMARY - MEDICATIONS TO TAKE
I will START or STAY ON the medications listed below when I get home from the hospital:    finasteride 5 mg oral tablet  -- 1 tab(s) by mouth once a day  -- Indication: For BPH    morphine 20 mg/mL oral concentrate  -- 5 milligram(s) by mouth every 6 hours, As Needed for moderate SOB,  indication: Hospice, CLL MDD:MDD 60mg  -- Caution federal law prohibits the transfer of this drug to any person other  than the person for whom it was prescribed.  Dilute this medication with liquid before administration.  May cause drowsiness.  Alcohol may intensify this effect.  Use care when operating dangerous machinery.  This prescription cannot be refilled.  Using more of this medication than prescribed may cause serious breathing problems.    -- Indication: For SOB    morphine 20 mg/mL oral concentrate  -- 10 milligram(s) by mouth every 6 hours, As Needed as need for severe shortness of breath MDD:60mg  -- Caution federal law prohibits the transfer of this drug to any person other  than the person for whom it was prescribed.  Dilute this medication with liquid before administration.  May cause drowsiness.  Alcohol may intensify this effect.  Use care when operating dangerous machinery.  This prescription cannot be refilled.  Using more of this medication than prescribed may cause serious breathing problems.    -- Indication: For SOB    allopurinol 300 mg oral tablet  -- 1 tab(s) by mouth once a day  -- Indication: For Ppx    Lipitor 20 mg oral tablet  -- 1 tab(s) by mouth once a day (at bedtime)  -- Indication: For HLD    Stiolto Respimat 2.5 mcg-2.5 mcg inhalation aerosol  -- 2 puff(s) inhaled every 24 hours  -- Indication: For SOB    Norvasc 10 mg oral tablet  -- 1 tab(s) by mouth once a day  -- Indication: For HTN    Synthroid 100 mcg (0.1 mg) oral tablet  -- 1 tab(s) by mouth once a day  -- Indication: For Hypothyroidism    folic acid 1 mg oral tablet  -- 1 tab(s) by mouth once a day  -- Indication: For Ppx

## 2017-06-01 NOTE — DISCHARGE NOTE ADULT - HOSPITAL COURSE
87 yo Male with h/o CLL c/b relapsed warm AIHA s/p chemotherapy with rituximab, cyclophosphamide, and dexamethasone (last tx ~1 month ago), HTN, HLD, hypothyroidism, BPH, bronchiectasis, RLL PNA c/b parapneumonic effusion s/p CT and MIST II tx protocol, and recent admission (5/1-5/9/17) for LLL PNA who presented after being referred to the ED by pt's pulmonologist after routine outpatient labs drawn on 5/25/17 revealed Ca 6.1 and was admitted for further workup of hypocalcemia. Workup showed that hypocalcemia was likely 2/2 vitamin D deficiency and patient has hospital course complicated acute respiratory distress due to pulmonary edema in the setting of blood transfusion which resolved with diuresis and bipap. During admission, the patient was seen by the [facility] and the [facility].    On admission, the patient denied any acute health complaints and felt he was at his baseline state of health. Pt does have a chronic cough, occasionally productive of white sputum, and baseline generalized weakness. On admission pt did have CXR that showed, "Lower and midlung opacities, best seen on the lateral view suggestive of multifocal pneumonia." This was felt to be residual from recent multifocal pneumonia and given patient was asymptomatic, was monitored off antibiotics. Initial workup of hypocalcemia involved checking intact PTH which was elevated at 104.2 and Vitamin D level which was low at 8.4, consistent with Vitamin D deficiency. Patient was aggressively repleted with both PO and IV calcium and then was started on weekly vitamin d (50,000 IU).     Patient had hospital course complicated by downtrending hemoglobin likely in setting of AIHA and required transfusion. During first transfusion, patient developed acute onset tachypnea and respiratory distress near the end of transfusion. CXR was consistent with increased vascular congestion and pulmonary edema. Patient was diuresed with 40 mg IV lasix and placed on bipap with improvement in symptoms. Given new respiratory symptoms with CXR showing multifocal pneumonia, procalcitonin was checked and was elevated. Patient was started on broad spectrum antibiotics with vanc, zosyn, azithro which was continued for ___ days before being deescalated to __ to complete __ course of antibiotics.    Patient was seen by PT who recommended ___. Once patient was on oral antibiotics and was medically stable, patient was discharged to ___. 87 yo Male with h/o CLL c/b relapsed warm AIHA s/p chemotherapy with rituximab, cyclophosphamide, and dexamethasone (last tx ~1 month ago), HTN, HLD, hypothyroidism, BPH, bronchiectasis, RLL PNA c/b parapneumonic effusion s/p CT and MIST II tx protocol, and recent admission (5/1-5/9/17) for LLL PNA who presented after being referred to the ED by pt's pulmonologist after routine outpatient labs drawn on 5/25/17 revealed Ca 6.1 and was admitted for further workup of hypocalcemia. Workup showed that hypocalcemia was likely 2/2 vitamin D deficiency and patient has hospital course complicated acute respiratory distress due to pulmonary edema in the setting of blood transfusion which resolved with diuresis and bipap. During admission, the patient was seen by the [facility] and the [facility].    On admission, the patient denied any acute health complaints and felt he was at his baseline state of health. Pt does have a chronic cough, occasionally productive of white sputum, and baseline generalized weakness. On admission pt did have CXR that showed, "Lower and midlung opacities, best seen on the lateral view suggestive of multifocal pneumonia." This was felt to be residual from recent multifocal pneumonia and given patient was asymptomatic, was monitored off antibiotics. Initial workup of hypocalcemia involved checking intact PTH which was elevated at 104.2 and Vitamin D level which was low at 8.4, consistent with Vitamin D deficiency. Patient was aggressively repleted with both PO and IV calcium and then was started on weekly vitamin d (50,000 IU).     Patient had hospital course complicated by downtrending hemoglobin likely in setting of AIHA and required transfusion. During first transfusion, patient developed acute onset tachypnea and respiratory distress near the end of transfusion. CXR was consistent with increased vascular congestion and pulmonary edema. Patient was diuresed with 40 mg IV lasix and placed on bipap with improvement in symptoms. Given new respiratory symptoms with CXR showing multifocal pneumonia, procalcitonin was checked and was elevated. Patient was started on broad spectrum antibiotics with vanc, zosyn, azithro which was continued for 5 days for empiric treatment of HCAP. Admission was further c/b persistent anemia despite multiple blood transfusions. Hematology was consulted and they believed that the anemia was likely due to AIHA. The Heme team decided against pursuing chemo options in treatment as patient's funcitonal status had declined rapidly and instead started treatment with cellcept. Due to the declining funcitonal status, Dr. Mathew the patient's outpatient hematologist recommended a palliative consult. Palliative was consulted and saw the patient and his wife. In discussion with the wife she decided to make the patient DNR and talk with the rest of the family about possibility of hospice. The family ended up deciding home hospice with patient's comfort status in mind. He will be discharged with home hospice.     Patient was seen by PT who recommended ___. Once patient was on oral antibiotics and was medically stable, patient was discharged to ___. 85 yo Male with h/o CLL c/b relapsed warm AIHA s/p chemotherapy with rituximab, cyclophosphamide, and dexamethasone (last tx ~1 month ago), HTN, HLD, hypothyroidism, BPH, bronchiectasis, RLL PNA c/b parapneumonic effusion s/p CT and MIST II tx protocol, and recent admission (5/1-5/9/17) for LLL PNA who presented after being referred to the ED by pt's pulmonologist after routine outpatient labs drawn on 5/25/17 revealed Ca 6.1 and was admitted for further workup of hypocalcemia. Workup showed that hypocalcemia was likely 2/2 vitamin D deficiency and patient has hospital course complicated acute respiratory distress due to pulmonary edema in the setting of blood transfusion which resolved with diuresis and bipap. During admission, the patient was seen by the [facility] and the [facility].    On admission, the patient denied any acute health complaints and felt he was at his baseline state of health. Pt does have a chronic cough, occasionally productive of white sputum, and baseline generalized weakness. On admission pt did have CXR that showed, "Lower and midlung opacities, best seen on the lateral view suggestive of multifocal pneumonia." This was felt to be residual from recent multifocal pneumonia and given patient was asymptomatic, was monitored off antibiotics. Initial workup of hypocalcemia involved checking intact PTH which was elevated at 104.2 and Vitamin D level which was low at 8.4, consistent with Vitamin D deficiency. Patient was aggressively repleted with both PO and IV calcium and then was started on weekly vitamin d (50,000 IU).     Patient had hospital course complicated by downtrending hemoglobin likely in setting of AIHA and required transfusion. During first transfusion, patient developed acute onset tachypnea and respiratory distress near the end of transfusion. CXR was consistent with increased vascular congestion and pulmonary edema. Patient was diuresed with 40 mg IV lasix and placed on bipap with improvement in symptoms. Given new respiratory symptoms with CXR showing multifocal pneumonia, procalcitonin was checked and was elevated. Patient was started on broad spectrum antibiotics with vanc, zosyn, azithro which was continued for 5 days for empiric treatment of HCAP. Admission was further c/b persistent anemia despite multiple blood transfusions. Hematology was consulted and they believed that the anemia was likely due to AIHA. The Heme team decided against pursuing chemo options in treatment as patient's funcitonal status had declined rapidly and instead started treatment with cellcept. Due to the declining funcitonal status, Dr. Mathew the patient's outpatient hematologist recommended a palliative consult. Palliative was consulted and saw the patient and his wife. In discussion with the wife she decided to make the patient DNR and talk with the rest of the family about possibility of hospice. The family ended up deciding home hospice with patient's comfort status in mind. He will be discharged with home hospice.

## 2017-06-01 NOTE — DISCHARGE NOTE ADULT - CONDITIONS AT DISCHARGE
Patient is alert and orientedx4. patient being discharge home with hospice. patient to leave unit with family via ambulance.

## 2017-06-01 NOTE — PROGRESS NOTE ADULT - PROBLEM SELECTOR PLAN 1
Patient presenting with hypocalcemia. Unlikely due to tumor lysis given normal phosphorous, normal potassium. Pt with low Vitamin D level in the past.  - will follow up vitamin D level; if low will dose 50,000 IU vitamin D and discharge with vitamin D once weekly  - will replete w/ IV calcium as needed  - will give PO oscal with vitamin d

## 2017-06-01 NOTE — DISCHARGE NOTE ADULT - CARE PLAN
Principal Discharge DX:	Hypocalcemia  Secondary Diagnosis:	CLL (chronic lymphocytic leukemia)  Secondary Diagnosis:	Multifocal pneumonia Principal Discharge DX:	Advance care planning  Goal:	Hospice  Instructions for follow-up, activity and diet:	You and your family has decided to pursue hospice. Please call hospice care network with any questions  Secondary Diagnosis:	Anemia  Instructions for follow-up, activity and diet:	You have experienced low blood counts during your admission requiring multiple transfusions. While under hospice care make them aware of any symptoms of anemia such as chest pain, SOB, palpitations or dizziness  Secondary Diagnosis:	Chronic lymphocytic leukemia  Instructions for follow-up, activity and diet:	You have chosen to pursue hospice care. Please call Dr. mathew with any questions about your disease  Secondary Diagnosis:	AIHA (autoimmune hemolytic anemia)  Instructions for follow-up, activity and diet:	You have chosen to pursue hospice care. Please call Dr. Mathew with any questions about your disease  Secondary Diagnosis:	Hypocalcemia  Instructions for follow-up, activity and diet:	Please continue to take calcium supplements

## 2017-06-01 NOTE — DISCHARGE NOTE ADULT - SECONDARY DIAGNOSIS.
CLL (chronic lymphocytic leukemia) Multifocal pneumonia AIHA (autoimmune hemolytic anemia) Hypocalcemia Anemia Chronic lymphocytic leukemia

## 2017-06-01 NOTE — DISCHARGE NOTE ADULT - PLAN OF CARE
Hospice You and your family has decided to pursue hospice. Please call hospice care network with any questions You have experienced low blood counts during your admission requiring multiple transfusions. While under hospice care make them aware of any symptoms of anemia such as chest pain, SOB, palpitations or dizziness You have chosen to pursue hospice care. Please call Dr. Mathew with any questions about your disease You have chosen to pursue hospice care. Please call Dr. joseph with any questions about your disease Please continue to take calcium supplements

## 2017-06-02 DIAGNOSIS — R06.00 DYSPNEA, UNSPECIFIED: ICD-10-CM

## 2017-06-02 LAB
BASE EXCESS BLDA CALC-SCNC: 1.3 MMOL/L — SIGNIFICANT CHANGE UP
BASOPHILS # BLD AUTO: 0.06 K/UL — SIGNIFICANT CHANGE UP (ref 0–0.2)
BASOPHILS NFR BLD AUTO: 0.1 % — SIGNIFICANT CHANGE UP (ref 0–2)
BUN SERPL-MCNC: 21 MG/DL — SIGNIFICANT CHANGE UP (ref 7–23)
BUN SERPL-MCNC: 21 MG/DL — SIGNIFICANT CHANGE UP (ref 7–23)
CA-I BLD-SCNC: 1.02 MMOL/L — LOW (ref 1.03–1.23)
CALCIUM SERPL-MCNC: 7.9 MG/DL — LOW (ref 8.4–10.5)
CALCIUM SERPL-MCNC: 8.1 MG/DL — LOW (ref 8.4–10.5)
CHLORIDE SERPL-SCNC: 104 MMOL/L — SIGNIFICANT CHANGE UP (ref 98–107)
CHLORIDE SERPL-SCNC: 99 MMOL/L — SIGNIFICANT CHANGE UP (ref 98–107)
CO2 SERPL-SCNC: 22 MMOL/L — SIGNIFICANT CHANGE UP (ref 22–31)
CO2 SERPL-SCNC: 30 MMOL/L — SIGNIFICANT CHANGE UP (ref 22–31)
CREAT SERPL-MCNC: 1.13 MG/DL — SIGNIFICANT CHANGE UP (ref 0.5–1.3)
CREAT SERPL-MCNC: 1.17 MG/DL — SIGNIFICANT CHANGE UP (ref 0.5–1.3)
EOSINOPHIL # BLD AUTO: 0.05 K/UL — SIGNIFICANT CHANGE UP (ref 0–0.5)
EOSINOPHIL NFR BLD AUTO: 0.1 % — SIGNIFICANT CHANGE UP (ref 0–6)
GLUCOSE BLDA-MCNC: 137 MG/DL — HIGH (ref 70–99)
GLUCOSE SERPL-MCNC: 109 MG/DL — HIGH (ref 70–99)
GLUCOSE SERPL-MCNC: 138 MG/DL — HIGH (ref 70–99)
HAPTOGLOB SERPL-MCNC: 251 MG/DL — HIGH (ref 34–200)
HCO3 BLDA-SCNC: 26 MMOL/L — SIGNIFICANT CHANGE UP (ref 22–26)
HCT VFR BLD CALC: 23.3 % — LOW (ref 39–50)
HCT VFR BLDA CALC: 21.3 % — LOW (ref 39–51)
HGB BLD-MCNC: 7.1 G/DL — LOW (ref 13–17)
HGB BLDA-MCNC: 6.8 G/DL — CRITICAL LOW (ref 13–17)
IMM GRANULOCYTES NFR BLD AUTO: 0.5 % — SIGNIFICANT CHANGE UP (ref 0–1.5)
LDH SERPL L TO P-CCNC: 309 U/L — HIGH (ref 135–225)
LYMPHOCYTES # BLD AUTO: 51.02 K/UL — HIGH (ref 1–3.3)
LYMPHOCYTES # BLD AUTO: 85.6 % — HIGH (ref 13–44)
MAGNESIUM SERPL-MCNC: 1.6 MG/DL — SIGNIFICANT CHANGE UP (ref 1.6–2.6)
MANUAL SMEAR VERIFICATION: SIGNIFICANT CHANGE UP
MCHC RBC-ENTMCNC: 30.5 % — LOW (ref 32–36)
MCHC RBC-ENTMCNC: 32 PG — SIGNIFICANT CHANGE UP (ref 27–34)
MCV RBC AUTO: 105 FL — HIGH (ref 80–100)
MONOCYTES # BLD AUTO: 1.22 K/UL — HIGH (ref 0–0.9)
MONOCYTES NFR BLD AUTO: 2 % — SIGNIFICANT CHANGE UP (ref 2–14)
NEUTROPHILS # BLD AUTO: 6.93 K/UL — SIGNIFICANT CHANGE UP (ref 1.8–7.4)
NEUTROPHILS NFR BLD AUTO: 11.7 % — LOW (ref 43–77)
PCO2 BLDA: 33 MMHG — LOW (ref 35–48)
PH BLDA: 7.49 PH — HIGH (ref 7.35–7.45)
PHOSPHATE SERPL-MCNC: 2.8 MG/DL — SIGNIFICANT CHANGE UP (ref 2.5–4.5)
PLATELET # BLD AUTO: 90 K/UL — LOW (ref 150–400)
PMV BLD: 9.6 FL — SIGNIFICANT CHANGE UP (ref 7–13)
PO2 BLDA: 111 MMHG — HIGH (ref 83–108)
POTASSIUM BLDA-SCNC: 3.2 MMOL/L — LOW (ref 3.4–4.5)
POTASSIUM SERPL-MCNC: 4.4 MMOL/L — SIGNIFICANT CHANGE UP (ref 3.5–5.3)
POTASSIUM SERPL-MCNC: 4.4 MMOL/L — SIGNIFICANT CHANGE UP (ref 3.5–5.3)
POTASSIUM SERPL-SCNC: 4.4 MMOL/L — SIGNIFICANT CHANGE UP (ref 3.5–5.3)
POTASSIUM SERPL-SCNC: 4.4 MMOL/L — SIGNIFICANT CHANGE UP (ref 3.5–5.3)
RBC # BLD: 2.22 M/UL — LOW (ref 4.2–5.8)
RBC # FLD: 18.5 % — HIGH (ref 10.3–14.5)
SAO2 % BLDA: 100 % — HIGH (ref 95–99)
SODIUM BLDA-SCNC: 133 MMOL/L — LOW (ref 136–146)
SODIUM SERPL-SCNC: 140 MMOL/L — SIGNIFICANT CHANGE UP (ref 135–145)
SODIUM SERPL-SCNC: 143 MMOL/L — SIGNIFICANT CHANGE UP (ref 135–145)
URATE SERPL-MCNC: 4.5 MG/DL — SIGNIFICANT CHANGE UP (ref 3.4–8.8)
WBC # BLD: 59.6 K/UL — CRITICAL HIGH (ref 3.8–10.5)
WBC # FLD AUTO: 59.6 K/UL — CRITICAL HIGH (ref 3.8–10.5)

## 2017-06-02 PROCEDURE — 71010: CPT | Mod: 26

## 2017-06-02 PROCEDURE — 99233 SBSQ HOSP IP/OBS HIGH 50: CPT | Mod: GC

## 2017-06-02 RX ORDER — FUROSEMIDE 40 MG
40 TABLET ORAL ONCE
Qty: 0 | Refills: 0 | Status: COMPLETED | OUTPATIENT
Start: 2017-06-02 | End: 2017-06-02

## 2017-06-02 RX ADMIN — Medication 1 MILLIGRAM(S): at 12:10

## 2017-06-02 RX ADMIN — AMLODIPINE BESYLATE 10 MILLIGRAM(S): 2.5 TABLET ORAL at 06:32

## 2017-06-02 RX ADMIN — HEPARIN SODIUM 5000 UNIT(S): 5000 INJECTION INTRAVENOUS; SUBCUTANEOUS at 17:11

## 2017-06-02 RX ADMIN — ERGOCALCIFEROL 50000 UNIT(S): 1.25 CAPSULE ORAL at 12:09

## 2017-06-02 RX ADMIN — Medication 3 MILLILITER(S): at 15:36

## 2017-06-02 RX ADMIN — Medication 40 MILLIGRAM(S): at 08:51

## 2017-06-02 RX ADMIN — FINASTERIDE 5 MILLIGRAM(S): 5 TABLET, FILM COATED ORAL at 12:10

## 2017-06-02 RX ADMIN — Medication 3 MILLILITER(S): at 10:03

## 2017-06-02 RX ADMIN — Medication 3 MILLILITER(S): at 04:32

## 2017-06-02 RX ADMIN — HEPARIN SODIUM 5000 UNIT(S): 5000 INJECTION INTRAVENOUS; SUBCUTANEOUS at 06:32

## 2017-06-02 RX ADMIN — Medication 3 MILLILITER(S): at 21:12

## 2017-06-02 RX ADMIN — Medication 300 MILLIGRAM(S): at 12:09

## 2017-06-02 RX ADMIN — Medication 200 MILLIGRAM(S): at 21:59

## 2017-06-02 RX ADMIN — Medication 1 TABLET(S): at 06:32

## 2017-06-02 RX ADMIN — Medication 100 MICROGRAM(S): at 06:32

## 2017-06-02 RX ADMIN — ATORVASTATIN CALCIUM 20 MILLIGRAM(S): 80 TABLET, FILM COATED ORAL at 21:59

## 2017-06-02 RX ADMIN — Medication 1 TABLET(S): at 17:11

## 2017-06-02 NOTE — CONSULT NOTE ADULT - ASSESSMENT
86M PMHx of CLL c/b relapsed warm AIHA s/p chemotherapy with rituximab, cyclophosphamide, and dexamethasone, HTN, HLD, hypothyroidism, BPH, bronchiectasis, RLL PNA c/b parapneumonic effusion s/p CT and MIST II tx protocol with frequent admissions for shortness of breath, presenting with similar complaints in setting of acidosis after recent transfusion

## 2017-06-02 NOTE — PROGRESS NOTE ADULT - PROBLEM SELECTOR PLAN 4
S/p RCD ~1 month ago. Follows with Dr. Lux Mathew as outpatient.  S/p PRBC txn yesterday  Monitor CBC and transfuse as needed

## 2017-06-02 NOTE — CONSULT NOTE ADULT - SUBJECTIVE AND OBJECTIVE BOX
CHIEF COMPLAINT:    HPI: 86M PMHx of CLL c/b relapsed warm AIHA s/p chemotherapy with rituximab, cyclophosphamide, and dexamethasone, HTN, HLD, hypothyroidism, BPH, bronchiectasis, RLL PNA c/b parapneumonic effusion s/p CT and MIST II tx protocol, and recent admission (5/1-5/9/17) for LLL PNA admitted with metabolic derangements from outpatient office. Patient requiring transfusion for anemia complicated by acute respiratory distress requiring BiPAP and diuresis and now acidosis. Patient reports persistent cough with some productive white sputum, however, no new symptoms. Patient denies fevers, chills, night sweats. Does report poor appetite, however, no feelings of aspiration.      PAST MEDICAL & SURGICAL HISTORY:  Pleural effusion  CLL (chronic lymphocytic leukemia)  Adult Hypothyroidism  Benign Prostatic Hypertrophy  Hypertension  Hyperlipidemia  No significant past surgical history      FAMILY HISTORY:  No pertinent family history in first degree relatives  Family history of cerebrovascular accident (CVA)  No pertinent family history in first degree relatives      SOCIAL HISTORY:  Smoking: former smoker  Marital Status: Marries  Advance Directives: Full code    Allergies    No Known Drug Allergies  to some types of seafood (Unknown)    HOME MEDICATIONS: reviewed    REVIEW OF SYSTEMS:  Constitutional: mild shortness of breath, cough, no fevers, chills, night sweats  Eyes: chronic poor left eye vision  ENT: no sore throat  CV: no palpitations, no chest pain  Resp: shortness of breath more so with ambulation, mild productive cough  GI: poor appetite, no nausea, vomiting, diarrhea, aspiration  : no dysuria    [x] All other systems negative    OBJECTIVE:  ICU Vital Signs Last 24 Hrs  T(C): 36.8, Max: 36.9 (06-01 @ 21:10)  T(F): 98.2, Max: 98.5 (06-01 @ 21:10)  HR: 99 (76 - 99)  BP: 151/53 (133/58 - 151/53)  RR: 18 (16 - 21)  SpO2: 96% (95% - 100%)      I & Os for 24h ending 06-02 @ 07:00  =============================================  IN: 600 ml / OUT: 450 ml / NET: 150 ml    I & Os for current day (as of 06-02 @ 15:25)  =============================================  IN: 0 ml / OUT: 300 ml / NET: -300 ml      PHYSICAL EXAM:  General: tachypneic, increased work of breathing  HEENT: PERRLA, EOMI  Lymph Nodes: no LAD  Neck: supple, no JVD  Respiratory: crackles throughout, mild wheezing bilaterally  Cardiovascular: RRR, no murmurs  Abdomen: soft, distended, non tender  Extremities: no edema, no cyanosis      HOSPITAL MEDICATIONS:  MEDICATIONS  (STANDING):  heparin  Injectable 5000Unit(s) SubCutaneous every 12 hours  ALBUTerol/ipratropium for Nebulization 3milliLiter(s) Nebulizer every 6 hours  finasteride 5milliGRAM(s) Oral daily  atorvastatin 20milliGRAM(s) Oral at bedtime  levothyroxine 100MICROGram(s) Oral daily  folic acid 1milliGRAM(s) Oral daily  allopurinol 300milliGRAM(s) Oral daily  calcium carbonate 1250 mG + Vitamin D (OsCal 500 + D) 1Tablet(s) Oral two times a day  calcium gluconate IVPB 1Gram(s) IV Intermittent every 1 hour  ergocalciferol [id number](s) Oral every week  amLODIPine   Tablet 10milliGRAM(s) Oral daily    MEDICATIONS  (PRN):  guaiFENesin    Syrup 200milliGRAM(s) Oral every 6 hours PRN Cough      LABS:                        7.1    59.60 )-----------( 90       ( 02 Jun 2017 05:35 )             23.3     06-02    143  |  104  |  21  ----------------------------<  109<H>  4.4   |  30  |  1.13    Ca    8.1<L>      02 Jun 2017 05:35  Phos  2.8     06-02  Mg     1.6     06-02      MICROBIOLOGY: BCx: NGTD x 2    RADIOLOGY: CXR -   [x] Reviewed:  Slight increase in perihilar opacities, consistent with   worsening pulmonary edema.    Small right pleural effusion.    New ill-defined right infrahilar opacity may represent alveolar pulmonary   edema and/or pneumonia.    Persistent left basilar opacity may represent atelectasis and/or   pneumonia.

## 2017-06-02 NOTE — PROGRESS NOTE ADULT - PROBLEM SELECTOR PLAN 2
Pt developed worsening respiratory distress following PRBC tx yesterday necessitating lasix tx and BIPAP- CXR showed worsening pulmonary edema  MICU/Pulm evaluated and rec standing lasix daily

## 2017-06-02 NOTE — CONSULT NOTE ADULT - PROBLEM SELECTOR RECOMMENDATION 9
- unclear etiology. previous diagnostic work up consistent with lymphocytic effusion in setting of known CLL  - can also be due to medication side effect of rituximab  - continue to monitor respiratory status  - bedside ultrasound showing focal B-line consolidation on R > L side with small bilateral effusion unlikely to be amenable to tap.   - monitor off antibiotics, diuresis per primary team  - send sputum cultures, sputum AFB (NOT concerned for TB), repeat blood cx  - send pro-calcitonin

## 2017-06-02 NOTE — PROGRESS NOTE ADULT - SUBJECTIVE AND OBJECTIVE BOX
Date of Admission: 5/31/17  Patient is a 86y old  Male who presents with a chief complaint of Abnormal outpatient labs (01 Jun 2017 14:45)    24H hour events: Patient with subjective shortness of breath overnight which was improved this morning. Patient with no complaints when seen this morning. Patient denies any fevers, chills, chest pain, abdominal pain, nausea, vomiting, diarrhea.     MEDICATIONS:  heparin  Injectable 5000Unit(s) SubCutaneous every 12 hours  amLODIPine   Tablet 10milliGRAM(s) Oral daily      ALBUTerol/ipratropium for Nebulization 3milliLiter(s) Nebulizer every 6 hours  guaiFENesin    Syrup 200milliGRAM(s) Oral every 6 hours PRN        finasteride 5milliGRAM(s) Oral daily  atorvastatin 20milliGRAM(s) Oral at bedtime  levothyroxine 100MICROGram(s) Oral daily  allopurinol 300milliGRAM(s) Oral daily    folic acid 1milliGRAM(s) Oral daily  calcium carbonate 1250 mG + Vitamin D (OsCal 500 + D) 1Tablet(s) Oral two times a day  calcium gluconate IVPB 1Gram(s) IV Intermittent every 1 hour  ergocalciferol 75317Apew(s) Oral every week      REVIEW OF SYSTEMS:  Complete 10point ROS negative.    PHYSICAL EXAM:  T(C): 36.7, Max: 37.3 (06-01 @ 09:28)  HR: 95 (76 - 99)  BP: 141/61 (133/58 - 197/70)  RR: 16 (16 - 30)  SpO2: 97% (94% - 100%)  Wt(kg): --  I&O's Summary    I & Os for current day (as of 02 Jun 2017 07:23)  =============================================  IN: 600 ml / OUT: 450 ml / NET: 150 ml      Appearance: Normal	  HEENT:   Normal oral mucosa, PERRL, EOMI	  Lymphatic: No lymphadenopathy  Cardiovascular: Normal S1 S2, No JVD, No murmurs, No edema  Respiratory: Lungs clear to auscultation	  Psychiatry: A & O x 3, Mood & affect appropriate  Gastrointestinal:  Soft, Non-tender, + BS	  Skin: No rashes, No ecchymoses, No cyanosis	  Neurologic: Non-focal  Extremities: Normal range of motion, No clubbing, cyanosis or edema  Vascular: Peripheral pulses palpable 2+ bilaterally        LABS:	 	    CBC Full  -  ( 01 Jun 2017 05:59 )  WBC Count : 72.07 K/uL  Hemoglobin : 6.5 g/dL  Hematocrit : 22.0 %  Platelet Count - Automated : 86 K/uL  Mean Cell Volume : 108.4 fL  Mean Cell Hemoglobin : 32.0 pg  Mean Cell Hemoglobin Concentration : 29.5 %  Auto Neutrophil # : 8.27 K/uL  Auto Lymphocyte # : 62.34 K/uL  Auto Monocyte # : 0.96 K/uL  Auto Eosinophil # : 0.02 K/uL  Auto Basophil # : 0.10 K/uL  Auto Neutrophil % : 11.6 %  Auto Lymphocyte % : 86.5 %  Auto Monocyte % : 1.3 %  Auto Eosinophil % : 0.0 %  Auto Basophil % : 0.1 %    06-01    142  |  106  |  26<H>  ----------------------------<  131<H>  4.2   |  19<L>  |  1.12  05-31    142  |  105  |  30<H>  ----------------------------<  135<H>  4.6   |  17<L>  |  1.24    Ca    6.9<L>      01 Jun 2017 05:59  Ca    7.4<L>      31 May 2017 22:10  Phos  3.4     06-01  Phos  2.5     05-31  Mg     1.8     06-01  Mg     1.7     05-31    TPro  5.5<L>  /  Alb  3.8  /  TBili  2.8<H>  /  DBili  x   /  AST  29  /  ALT  20  /  AlkPhos  103  05-31      Radiology  CXR 6/1     IMPRESSION: Slight increase in perihilar opacities, consistent with   worsening pulmonary edema.    Small right pleural effusion.    New ill-defined right infrahilar opacity may represent alveolar pulmonary   edema and/or pneumonia.    Persistent left basilar opacity may represent atelectasis and/or   pneumonia. Date of Admission: 5/31/17  Patient is a 86y old  Male who presents with a chief complaint of Abnormal outpatient labs (01 Jun 2017 14:45)    24H hour events: Patient used BIPAP ON with no worsening SOB. Transitioned to NC this AM with no immediate resp distress.     MEDICATIONS:  heparin  Injectable 5000Unit(s) SubCutaneous every 12 hours  amLODIPine   Tablet 10milliGRAM(s) Oral daily      ALBUTerol/ipratropium for Nebulization 3milliLiter(s) Nebulizer every 6 hours  guaiFENesin    Syrup 200milliGRAM(s) Oral every 6 hours PRN        finasteride 5milliGRAM(s) Oral daily  atorvastatin 20milliGRAM(s) Oral at bedtime  levothyroxine 100MICROGram(s) Oral daily  allopurinol 300milliGRAM(s) Oral daily    folic acid 1milliGRAM(s) Oral daily  calcium carbonate 1250 mG + Vitamin D (OsCal 500 + D) 1Tablet(s) Oral two times a day  calcium gluconate IVPB 1Gram(s) IV Intermittent every 1 hour  ergocalciferol 78121Hhpj(s) Oral every week      REVIEW OF SYSTEMS:  Complete 10point ROS negative.    PHYSICAL EXAM:  T(C): 36.7, Max: 37.3 (06-01 @ 09:28)  HR: 95 (76 - 99)  BP: 141/61 (133/58 - 197/70)  RR: 16 (16 - 30)  SpO2: 97% (94% - 100%)  Wt(kg): --  I&O's Summary    I & Os for current day (as of 02 Jun 2017 07:23)  =============================================  IN: 600 ml / OUT: 450 ml / NET: 150 ml      Appearance: Normal	  HEENT:   Normal oral mucosa, PERRL, EOMI	  Lymphatic: No lymphadenopathy  Cardiovascular: Normal S1 S2, No JVD, No murmurs, No edema  Respiratory: B/l crackles   Psychiatry: A & O x 3, Mood & affect appropriate  Gastrointestinal:  Soft, Non-tender, + BS	  Skin: No rashes, No ecchymoses, No cyanosis	  Neurologic: Non-focal  Extremities: Normal range of motion, No clubbing, cyanosis or edema  Vascular: Peripheral pulses palpable 2+ bilaterally        LABS:	 	                            7.1    59.60 )-----------( 90       ( 02 Jun 2017 05:35 )               23.3   06-02    143  |  104  |  21  ----------------------------<  109<H>  4.4   |  30  |  1.13    Ca    8.1<L>      02 Jun 2017 05:35  Phos  2.8     06-02  Mg     1.6     06-02  Ionized Ca- 1.01          Radiology  CXR 6/1     IMPRESSION: Slight increase in perihilar opacities, consistent with   worsening pulmonary edema.    Small right pleural effusion.    New ill-defined right infrahilar opacity may represent alveolar pulmonary   edema and/or pneumonia.    Persistent left basilar opacity may represent atelectasis and/or   pneumonia. Date of Admission: 5/31/17  Patient is a 86y old  Male who presents with a chief complaint of SOB    24H hour events: Patient used BIPAP ON with no worsening SOB. Transitioned to NC this AM with no immediate resp distress.     MEDICATIONS:  heparin  Injectable 5000Unit(s) SubCutaneous every 12 hours  amLODIPine   Tablet 10milliGRAM(s) Oral daily      ALBUTerol/ipratropium for Nebulization 3milliLiter(s) Nebulizer every 6 hours  guaiFENesin    Syrup 200milliGRAM(s) Oral every 6 hours PRN        finasteride 5milliGRAM(s) Oral daily  atorvastatin 20milliGRAM(s) Oral at bedtime  levothyroxine 100MICROGram(s) Oral daily  allopurinol 300milliGRAM(s) Oral daily    folic acid 1milliGRAM(s) Oral daily  calcium carbonate 1250 mG + Vitamin D (OsCal 500 + D) 1Tablet(s) Oral two times a day  calcium gluconate IVPB 1Gram(s) IV Intermittent every 1 hour  ergocalciferol 24336Psed(s) Oral every week      REVIEW OF SYSTEMS:  Complete 10point ROS negative.    PHYSICAL EXAM:  T(C): 36.7, Max: 37.3 (06-01 @ 09:28)  HR: 95 (76 - 99)  BP: 141/61 (133/58 - 197/70)  RR: 16 (16 - 30)  SpO2: 97% (94% - 100%)  Wt(kg): --  I&O's Summary    I & Os for current day (as of 02 Jun 2017 07:23)  =============================================  IN: 600 ml / OUT: 450 ml / NET: 150 ml      Appearance: Normal	  HEENT:   Normal oral mucosa, PERRL, EOMI	  Lymphatic: No lymphadenopathy  Cardiovascular: Normal S1 S2, No JVD, No murmurs, No edema  Respiratory: B/l crackles   Psychiatry: A & O x 3, Mood & affect appropriate  Gastrointestinal:  Soft, Non-tender, + BS	  Skin: No rashes, No ecchymoses, No cyanosis	  Neurologic: Non-focal  Extremities: Normal range of motion, No clubbing, cyanosis or edema  Vascular: Peripheral pulses palpable 2+ bilaterally        LABS:	 	                            7.1    59.60 )-----------( 90       ( 02 Jun 2017 05:35 )               23.3   06-02    143  |  104  |  21  ----------------------------<  109<H>  4.4   |  30  |  1.13    Ca    8.1<L>      02 Jun 2017 05:35  Phos  2.8     06-02  Mg     1.6     06-02  Ionized Ca- 1.01          Radiology  CXR 6/1     IMPRESSION: Slight increase in perihilar opacities, consistent with   worsening pulmonary edema.    Small right pleural effusion.    New ill-defined right infrahilar opacity may represent alveolar pulmonary   edema and/or pneumonia.    Persistent left basilar opacity may represent atelectasis and/or   pneumonia.

## 2017-06-02 NOTE — PROGRESS NOTE ADULT - ASSESSMENT
87 yo Male with h/o CLL c/b relapsed warm AIHA s/p chemotherapy with rituximab, cyclophosphamide, and dexamethasone (last tx ~1 month ago), HTN, HLD, hypothyroidism, BPH, bronchiectasis, RLL PNA c/b parapneumonic effusion s/p CT and MIST II tx protocol, and recent admission (5/1-5/9/17) for LLL PNA who is presenting with hypocalcemia w/ unclear etiology; possibly due to vitamin D deficiency. Admit c/b respiratory distress following PRBC transfusion likely 2/2 fluid overload necessitating diuretic tx and intermittent BIPAP.

## 2017-06-02 NOTE — PROGRESS NOTE ADULT - PROBLEM SELECTOR PLAN 3
Repeat CXR redemonstrated pleural effusion  - will monitor off antibiotics  - Likely residual from old multifocal pneumonia; showing some improvement  - Pt follows with Dr. Viveros.

## 2017-06-02 NOTE — DIETITIAN INITIAL EVALUATION ADULT. - OTHER INFO
Received nutrition consult for Assessment. Pt with H/O CLL, admitted with Dx Hypomagnesemia. Family reports Pt with good appetite but does not want to be on restricted DASH/TLC diet. Would also like to try Glucerna Shakes with meals. Denies chewing, swallowing difficulties or any nausea, vomiting, diarrhea, constipation.

## 2017-06-02 NOTE — DIETITIAN INITIAL EVALUATION ADULT. - SOURCE
family/significant other/EMRR Reviewed, Spoke with Pt's Daughter and Spouse at bedside./patient/other (specify)

## 2017-06-02 NOTE — CONSULT NOTE ADULT - ATTENDING COMMENTS
86M PMHx of CLL s/p chemotherapy with rituximab, cyclophosphamide, and dexamethasone, HTN, HLD, hypothyroidism, BPH, bronchiectasis, RLL PNA has been treated for a parapneumonic effusion with chest tube and MIST II protocol with frequent admissions treated with steroids and antibiotics in the past. This time admitted with hypocalcemia RRT called yesterday for shortness of breath.  Appears slightly tachypneic on supplemental oxygen but has just finished a PT session.    CXR with not much change from prior, afebrile bedside lung ultrasound showed small bilateral effusions and focal b-lines on the right side.    -hold off on abx at this time check cultures, including sputum cultures as patient has bronchiectasis, consider checking procalcitonin  -blood work until yesterday showed metabolic acidosis which can lead to tachypnea check repeat BMP etiology of metabolic acidosis unclear ? tumor lysis  -KARRI on admission has resolved  - supplemental oxygen to keep SPO2 > 92%  - will follow

## 2017-06-02 NOTE — PROGRESS NOTE ADULT - PROBLEM SELECTOR PLAN 1
Patient presenting with hypocalcemia. Unlikely due to tumor lysis given normal phosphorous, normal potassium. Pt with low Vitamin D level in the past.  -Oscal started, weekly 50,000 IU Vit D supplementation initiated  - will follow up vitamin D level; if low will dose 50,000 IU vitamin D and discharge with vitamin D once weekly  - will replete w/ IV calcium as needed  - will give PO oscal with vitamin d

## 2017-06-03 LAB
BUN SERPL-MCNC: 17 MG/DL — SIGNIFICANT CHANGE UP (ref 7–23)
CALCIUM SERPL-MCNC: 7.7 MG/DL — LOW (ref 8.4–10.5)
CHLORIDE SERPL-SCNC: 102 MMOL/L — SIGNIFICANT CHANGE UP (ref 98–107)
CO2 SERPL-SCNC: 24 MMOL/L — SIGNIFICANT CHANGE UP (ref 22–31)
CREAT SERPL-MCNC: 0.96 MG/DL — SIGNIFICANT CHANGE UP (ref 0.5–1.3)
GLUCOSE SERPL-MCNC: 116 MG/DL — HIGH (ref 70–99)
HCT VFR BLD CALC: 20.9 % — CRITICAL LOW (ref 39–50)
HGB BLD-MCNC: 6.4 G/DL — CRITICAL LOW (ref 13–17)
MAGNESIUM SERPL-MCNC: 1.4 MG/DL — LOW (ref 1.6–2.6)
MCHC RBC-ENTMCNC: 30.6 % — LOW (ref 32–36)
MCHC RBC-ENTMCNC: 31.5 PG — SIGNIFICANT CHANGE UP (ref 27–34)
MCV RBC AUTO: 103 FL — HIGH (ref 80–100)
PHOSPHATE SERPL-MCNC: 2.9 MG/DL — SIGNIFICANT CHANGE UP (ref 2.5–4.5)
PLATELET # BLD AUTO: 73 K/UL — LOW (ref 150–400)
PMV BLD: 10.1 FL — SIGNIFICANT CHANGE UP (ref 7–13)
POTASSIUM SERPL-MCNC: 3.5 MMOL/L — SIGNIFICANT CHANGE UP (ref 3.5–5.3)
POTASSIUM SERPL-SCNC: 3.5 MMOL/L — SIGNIFICANT CHANGE UP (ref 3.5–5.3)
PROCALCITONIN SERPL-MCNC: 4.11 NG/ML — HIGH (ref 0–0.04)
RBC # BLD: 2.03 M/UL — LOW (ref 4.2–5.8)
RBC # FLD: 17.7 % — HIGH (ref 10.3–14.5)
SODIUM SERPL-SCNC: 140 MMOL/L — SIGNIFICANT CHANGE UP (ref 135–145)
WBC # BLD: 46.6 K/UL — CRITICAL HIGH (ref 3.8–10.5)
WBC # FLD AUTO: 46.6 K/UL — CRITICAL HIGH (ref 3.8–10.5)

## 2017-06-03 PROCEDURE — 99233 SBSQ HOSP IP/OBS HIGH 50: CPT | Mod: GC

## 2017-06-03 RX ORDER — FUROSEMIDE 40 MG
40 TABLET ORAL ONCE
Qty: 0 | Refills: 0 | Status: COMPLETED | OUTPATIENT
Start: 2017-06-03 | End: 2017-06-03

## 2017-06-03 RX ORDER — AZITHROMYCIN 500 MG/1
500 TABLET, FILM COATED ORAL EVERY 24 HOURS
Qty: 0 | Refills: 0 | Status: DISCONTINUED | OUTPATIENT
Start: 2017-06-03 | End: 2017-06-05

## 2017-06-03 RX ORDER — PIPERACILLIN AND TAZOBACTAM 4; .5 G/20ML; G/20ML
3.38 INJECTION, POWDER, LYOPHILIZED, FOR SOLUTION INTRAVENOUS ONCE
Qty: 0 | Refills: 0 | Status: COMPLETED | OUTPATIENT
Start: 2017-06-03 | End: 2017-06-03

## 2017-06-03 RX ORDER — MAGNESIUM SULFATE 500 MG/ML
2 VIAL (ML) INJECTION ONCE
Qty: 0 | Refills: 0 | Status: COMPLETED | OUTPATIENT
Start: 2017-06-03 | End: 2017-06-03

## 2017-06-03 RX ORDER — VANCOMYCIN HCL 1 G
1000 VIAL (EA) INTRAVENOUS EVERY 24 HOURS
Qty: 0 | Refills: 0 | Status: DISCONTINUED | OUTPATIENT
Start: 2017-06-04 | End: 2017-06-08

## 2017-06-03 RX ORDER — VANCOMYCIN HCL 1 G
1000 VIAL (EA) INTRAVENOUS ONCE
Qty: 0 | Refills: 0 | Status: COMPLETED | OUTPATIENT
Start: 2017-06-03 | End: 2017-06-03

## 2017-06-03 RX ORDER — MAGNESIUM SULFATE 500 MG/ML
1 VIAL (ML) INJECTION ONCE
Qty: 0 | Refills: 0 | Status: COMPLETED | OUTPATIENT
Start: 2017-06-03 | End: 2017-06-03

## 2017-06-03 RX ORDER — VANCOMYCIN HCL 1 G
VIAL (EA) INTRAVENOUS
Qty: 0 | Refills: 0 | Status: DISCONTINUED | OUTPATIENT
Start: 2017-06-03 | End: 2017-06-08

## 2017-06-03 RX ORDER — PIPERACILLIN AND TAZOBACTAM 4; .5 G/20ML; G/20ML
3.38 INJECTION, POWDER, LYOPHILIZED, FOR SOLUTION INTRAVENOUS EVERY 8 HOURS
Qty: 0 | Refills: 0 | Status: DISCONTINUED | OUTPATIENT
Start: 2017-06-03 | End: 2017-06-08

## 2017-06-03 RX ADMIN — PIPERACILLIN AND TAZOBACTAM 200 GRAM(S): 4; .5 INJECTION, POWDER, LYOPHILIZED, FOR SOLUTION INTRAVENOUS at 12:32

## 2017-06-03 RX ADMIN — Medication 1 TABLET(S): at 17:38

## 2017-06-03 RX ADMIN — HEPARIN SODIUM 5000 UNIT(S): 5000 INJECTION INTRAVENOUS; SUBCUTANEOUS at 06:42

## 2017-06-03 RX ADMIN — Medication 3 MILLILITER(S): at 22:17

## 2017-06-03 RX ADMIN — Medication 100 MICROGRAM(S): at 06:42

## 2017-06-03 RX ADMIN — Medication 50 GRAM(S): at 22:00

## 2017-06-03 RX ADMIN — FINASTERIDE 5 MILLIGRAM(S): 5 TABLET, FILM COATED ORAL at 12:35

## 2017-06-03 RX ADMIN — Medication 3 MILLILITER(S): at 03:11

## 2017-06-03 RX ADMIN — Medication 3 MILLILITER(S): at 15:20

## 2017-06-03 RX ADMIN — Medication 1 MILLIGRAM(S): at 12:35

## 2017-06-03 RX ADMIN — Medication 200 MILLIGRAM(S): at 06:42

## 2017-06-03 RX ADMIN — AMLODIPINE BESYLATE 10 MILLIGRAM(S): 2.5 TABLET ORAL at 06:41

## 2017-06-03 RX ADMIN — AZITHROMYCIN 250 MILLIGRAM(S): 500 TABLET, FILM COATED ORAL at 23:06

## 2017-06-03 RX ADMIN — Medication 40 MILLIGRAM(S): at 10:13

## 2017-06-03 RX ADMIN — ATORVASTATIN CALCIUM 20 MILLIGRAM(S): 80 TABLET, FILM COATED ORAL at 22:06

## 2017-06-03 RX ADMIN — Medication 1 TABLET(S): at 06:41

## 2017-06-03 RX ADMIN — HEPARIN SODIUM 5000 UNIT(S): 5000 INJECTION INTRAVENOUS; SUBCUTANEOUS at 17:38

## 2017-06-03 RX ADMIN — Medication 200 MILLIGRAM(S): at 16:26

## 2017-06-03 RX ADMIN — Medication 300 MILLIGRAM(S): at 12:35

## 2017-06-03 RX ADMIN — Medication 100 GRAM(S): at 12:32

## 2017-06-03 RX ADMIN — Medication 40 MILLIGRAM(S): at 16:26

## 2017-06-03 RX ADMIN — Medication 250 MILLIGRAM(S): at 17:39

## 2017-06-03 RX ADMIN — Medication 3 MILLILITER(S): at 10:00

## 2017-06-03 NOTE — PROGRESS NOTE ADULT - SUBJECTIVE AND OBJECTIVE BOX
Medicine R1 Progress Note    Patient is a 86y old  Male who presents with a chief complaint of Abnormal outpatient labs (01 Jun 2017 14:45)    SUBJECTIVE / OVERNIGHT EVENTS:  No acute overnight events. Patient feeling better today without any dyspnea overnight. No complaints when seen this morning. Denies any fevers, chills, chest pain, SOB, increase in cough/sputum production, abdominal pain, nausea, vomiting, diarrhea.     MEDICATIONS  (STANDING):  heparin  Injectable 5000Unit(s) SubCutaneous every 12 hours  ALBUTerol/ipratropium for Nebulization 3milliLiter(s) Nebulizer every 6 hours  finasteride 5milliGRAM(s) Oral daily  atorvastatin 20milliGRAM(s) Oral at bedtime  levothyroxine 100MICROGram(s) Oral daily  folic acid 1milliGRAM(s) Oral daily  allopurinol 300milliGRAM(s) Oral daily  calcium carbonate 1250 mG + Vitamin D (OsCal 500 + D) 1Tablet(s) Oral two times a day  calcium gluconate IVPB 1Gram(s) IV Intermittent every 1 hour  ergocalciferol 16341Dtfm(s) Oral every week  amLODIPine   Tablet 10milliGRAM(s) Oral daily  vancomycin  IVPB 1000milliGRAM(s) IV Intermittent once  furosemide   Injectable 40milliGRAM(s) IV Push once  piperacillin/tazobactam IVPB. 3.375Gram(s) IV Intermittent once  piperacillin/tazobactam IVPB. 3.375Gram(s) IV Intermittent every 8 hours  vancomycin  IVPB  IV Intermittent     MEDICATIONS  (PRN):  guaiFENesin    Syrup 200milliGRAM(s) Oral every 6 hours PRN Cough    Vital Signs Last 24 Hrs  T(C): 36.7, Max: 36.8 (06-02 @ 13:50)  HR: 74 (74 - 109)  BP: 133/58 (130/53 - 151/53)  RR: 18 (18 - 18)  SpO2: 100% (96% - 100%)  Wt(kg): --  CAPILLARY BLOOD GLUCOSE    I&O's Summary    I & Os for current day (as of 03 Jun 2017 08:29)  =============================================  IN: 200 ml / OUT: 1000 ml / NET: -800 ml      PHYSICAL EXAM:  GENERAL: NAD, well-developed  HEAD:  Atraumatic, Normocephalic  EYES: PERRL, conjunctiva and sclera clear  NECK: Supple, No JVD  CHEST/LUNG: Faint bibasilar crackles  HEART: Regular rate and rhythm; No murmurs, rubs, or gallops  ABDOMEN: Soft, Nontender, Nondistended; Bowel sounds present  EXTREMITIES:  2+ Peripheral Pulses, No clubbing, cyanosis, or edema  PSYCH: AAOx3  NEUROLOGY: non-focal  SKIN: No rashes or lesions    LABS:                        6.4    46.60 )-----------( 73       ( 03 Jun 2017 06:00 )             20.9     06-03    140  |  102  |  17  ----------------------------<  116<H>  3.5   |  24  |  0.96    Ca    7.7<L>      03 Jun 2017 06:00  Phos  2.9     06-03  Mg     1.4     06-03                RADIOLOGY & ADDITIONAL TESTS:    Imaging Personally Reviewed:    Consultant(s) Notes Reviewed:      Care Discussed with Consultants/Other Providers:

## 2017-06-03 NOTE — PROGRESS NOTE ADULT - ASSESSMENT
85 yo Male with h/o CLL c/b relapsed warm AIHA s/p chemotherapy with rituximab, cyclophosphamide, and dexamethasone (last tx ~1 month ago), HTN, HLD, hypothyroidism, BPH, bronchiectasis, RLL PNA c/b parapneumonic effusion s/p CT and MIST II tx protocol, and recent admission (5/1-5/9/17) for LLL PNA who is presenting with hypocalcemia w/ due to vitamin D deficiency. Admission c/b respiratory distress following PRBC transfusion likely 2/2 fluid overload necessitating diuretic tx and intermittent BIPAP.

## 2017-06-03 NOTE — PROGRESS NOTE ADULT - PROBLEM SELECTOR PLAN 1
Patient presenting with hypocalcemia. Unlikely due to tumor lysis given normal phosphorous, normal potassium. Pt with low Vitamin D level in the past.  -Oscal started, weekly 50,000 IU Vit D supplementation initiated  - will replete w/ IV calcium as needed  - will give PO oscal with vitamin d

## 2017-06-03 NOTE — PROGRESS NOTE ADULT - PROBLEM SELECTOR PLAN 2
Patient's respiratory status is improved - comfortable on nasal cannula  - likely in the setting of volume overload from blood transfusion  - patient with elevated procalcitonin in the setting of infiltrates on cxr - will start empiric antibiotics for pneumonia  - will wean nasal cannula as tolerated

## 2017-06-03 NOTE — PROGRESS NOTE ADULT - PROBLEM SELECTOR PLAN 3
Repeat CXR redemonstrated pleural effusion  - antibiotics as above  - Likely residual from old multifocal pneumonia; showing some improvement  - Pt follows with Dr. Viveros.

## 2017-06-04 LAB
BUN SERPL-MCNC: 19 MG/DL — SIGNIFICANT CHANGE UP (ref 7–23)
CALCIUM SERPL-MCNC: 8.3 MG/DL — LOW (ref 8.4–10.5)
CHLORIDE SERPL-SCNC: 96 MMOL/L — LOW (ref 98–107)
CO2 SERPL-SCNC: 27 MMOL/L — SIGNIFICANT CHANGE UP (ref 22–31)
CREAT SERPL-MCNC: 0.98 MG/DL — SIGNIFICANT CHANGE UP (ref 0.5–1.3)
GLUCOSE SERPL-MCNC: 119 MG/DL — HIGH (ref 70–99)
HCT VFR BLD CALC: 23.3 % — LOW (ref 39–50)
HGB BLD-MCNC: 7.4 G/DL — LOW (ref 13–17)
L PNEUMO AG UR QL: NEGATIVE — SIGNIFICANT CHANGE UP
MAGNESIUM SERPL-MCNC: 1.9 MG/DL — SIGNIFICANT CHANGE UP (ref 1.6–2.6)
MCHC RBC-ENTMCNC: 31.6 PG — SIGNIFICANT CHANGE UP (ref 27–34)
MCHC RBC-ENTMCNC: 31.8 % — LOW (ref 32–36)
MCV RBC AUTO: 99.6 FL — SIGNIFICANT CHANGE UP (ref 80–100)
PHOSPHATE SERPL-MCNC: 3 MG/DL — SIGNIFICANT CHANGE UP (ref 2.5–4.5)
PLATELET # BLD AUTO: 82 K/UL — LOW (ref 150–400)
PMV BLD: 10.1 FL — SIGNIFICANT CHANGE UP (ref 7–13)
POTASSIUM SERPL-MCNC: 3.2 MMOL/L — LOW (ref 3.5–5.3)
POTASSIUM SERPL-SCNC: 3.2 MMOL/L — LOW (ref 3.5–5.3)
RBC # BLD: 2.34 M/UL — LOW (ref 4.2–5.8)
RBC # FLD: 18.5 % — HIGH (ref 10.3–14.5)
SODIUM SERPL-SCNC: 141 MMOL/L — SIGNIFICANT CHANGE UP (ref 135–145)
WBC # BLD: 61.47 K/UL — CRITICAL HIGH (ref 3.8–10.5)
WBC # FLD AUTO: 61.47 K/UL — CRITICAL HIGH (ref 3.8–10.5)

## 2017-06-04 PROCEDURE — 99233 SBSQ HOSP IP/OBS HIGH 50: CPT | Mod: GC

## 2017-06-04 RX ORDER — POTASSIUM CHLORIDE 20 MEQ
40 PACKET (EA) ORAL ONCE
Qty: 0 | Refills: 0 | Status: COMPLETED | OUTPATIENT
Start: 2017-06-04 | End: 2017-06-04

## 2017-06-04 RX ORDER — POTASSIUM CHLORIDE 20 MEQ
10 PACKET (EA) ORAL
Qty: 0 | Refills: 0 | Status: DISCONTINUED | OUTPATIENT
Start: 2017-06-04 | End: 2017-06-04

## 2017-06-04 RX ADMIN — FINASTERIDE 5 MILLIGRAM(S): 5 TABLET, FILM COATED ORAL at 11:35

## 2017-06-04 RX ADMIN — Medication 1 MILLIGRAM(S): at 11:35

## 2017-06-04 RX ADMIN — HEPARIN SODIUM 5000 UNIT(S): 5000 INJECTION INTRAVENOUS; SUBCUTANEOUS at 18:54

## 2017-06-04 RX ADMIN — Medication 3 MILLILITER(S): at 21:45

## 2017-06-04 RX ADMIN — Medication 250 MILLIGRAM(S): at 11:35

## 2017-06-04 RX ADMIN — PIPERACILLIN AND TAZOBACTAM 25 GRAM(S): 4; .5 INJECTION, POWDER, LYOPHILIZED, FOR SOLUTION INTRAVENOUS at 16:55

## 2017-06-04 RX ADMIN — ATORVASTATIN CALCIUM 20 MILLIGRAM(S): 80 TABLET, FILM COATED ORAL at 22:47

## 2017-06-04 RX ADMIN — HEPARIN SODIUM 5000 UNIT(S): 5000 INJECTION INTRAVENOUS; SUBCUTANEOUS at 06:43

## 2017-06-04 RX ADMIN — AZITHROMYCIN 250 MILLIGRAM(S): 500 TABLET, FILM COATED ORAL at 22:47

## 2017-06-04 RX ADMIN — PIPERACILLIN AND TAZOBACTAM 25 GRAM(S): 4; .5 INJECTION, POWDER, LYOPHILIZED, FOR SOLUTION INTRAVENOUS at 07:00

## 2017-06-04 RX ADMIN — PIPERACILLIN AND TAZOBACTAM 25 GRAM(S): 4; .5 INJECTION, POWDER, LYOPHILIZED, FOR SOLUTION INTRAVENOUS at 00:58

## 2017-06-04 RX ADMIN — Medication 3 MILLILITER(S): at 03:03

## 2017-06-04 RX ADMIN — Medication 3 MILLILITER(S): at 09:57

## 2017-06-04 RX ADMIN — Medication 3 MILLILITER(S): at 15:28

## 2017-06-04 RX ADMIN — Medication 100 MICROGRAM(S): at 06:47

## 2017-06-04 RX ADMIN — Medication 40 MILLIEQUIVALENT(S): at 11:35

## 2017-06-04 RX ADMIN — Medication 300 MILLIGRAM(S): at 11:35

## 2017-06-04 RX ADMIN — Medication 1 TABLET(S): at 06:43

## 2017-06-04 RX ADMIN — Medication 1 TABLET(S): at 18:54

## 2017-06-04 RX ADMIN — AMLODIPINE BESYLATE 10 MILLIGRAM(S): 2.5 TABLET ORAL at 06:43

## 2017-06-04 NOTE — PROGRESS NOTE ADULT - PROBLEM SELECTOR PLAN 2
Patient's respiratory status is improved - comfortable on nasal cannula  - likely in the setting of volume overload from blood transfusion  - will c/w empiric antibiotics with Vanc and Zosyn  - will wean nasal cannula as tolerated Patient's respiratory status is improved - comfortable on nasal cannula  - likely in the setting of volume overload from blood transfusion  - will c/w empiric antibiotics with Vanc and Zosyn (Day 2)  - f/u vanc trough today  - will wean nasal cannula as tolerated

## 2017-06-04 NOTE — PROGRESS NOTE ADULT - SUBJECTIVE AND OBJECTIVE BOX
Medicine R1 Progress Note    Patient is a 86y old  Male who presents with a chief complaint of Abnormal outpatient labs (01 Jun 2017 14:45)    SUBJECTIVE / OVERNIGHT EVENTS:  No acute overnight events. Patient feeling better today and tolerated transfusion well yesterday. Patient without any complaints this morning. No fevers, chills, chest pain, SOB, abdominal pain, nausea, vomiting, diarrhea, constipation.     MEDICATIONS  (STANDING):  heparin  Injectable 5000Unit(s) SubCutaneous every 12 hours  ALBUTerol/ipratropium for Nebulization 3milliLiter(s) Nebulizer every 6 hours  finasteride 5milliGRAM(s) Oral daily  atorvastatin 20milliGRAM(s) Oral at bedtime  levothyroxine 100MICROGram(s) Oral daily  folic acid 1milliGRAM(s) Oral daily  allopurinol 300milliGRAM(s) Oral daily  calcium carbonate 1250 mG + Vitamin D (OsCal 500 + D) 1Tablet(s) Oral two times a day  calcium gluconate IVPB 1Gram(s) IV Intermittent every 1 hour  ergocalciferol 23754Lvsc(s) Oral every week  amLODIPine   Tablet 10milliGRAM(s) Oral daily  piperacillin/tazobactam IVPB. 3.375Gram(s) IV Intermittent every 8 hours  vancomycin  IVPB  IV Intermittent   vancomycin  IVPB 1000milliGRAM(s) IV Intermittent every 24 hours  azithromycin  IVPB 500milliGRAM(s) IV Intermittent every 24 hours  potassium chloride  10 mEq/100 mL IVPB 10milliEquivalent(s) IV Intermittent every 1 hour    MEDICATIONS  (PRN):  guaiFENesin    Syrup 200milliGRAM(s) Oral every 6 hours PRN Cough    Vital Signs Last 24 Hrs  T(C): 36.7, Max: 37.2 (06-03 @ 18:53)  HR: 64 (64 - 100)  BP: 140/53 (134/56 - 142/58)  RR: 18 (18 - 18)  SpO2: 100% (96% - 100%)  Wt(kg): --  CAPILLARY BLOOD GLUCOSE    I&O's Summary    I & Os for current day (as of 04 Jun 2017 09:50)  =============================================  IN: 1150 ml / OUT: 1350 ml / NET: -200 ml    PHYSICAL EXAM:  GENERAL: NAD, well-developed, sitting up comfortably  HEAD:  Atraumatic, Normocephalic  EYES: PERRL, conjunctiva and sclera clear  NECK: Supple, No JVD  CHEST/LUNG: Clear to auscultation bilaterally; No wheeze  HEART: Regular rate and rhythm; No murmurs, rubs, or gallops  ABDOMEN: Soft, Nontender, Nondistended; Bowel sounds present  EXTREMITIES:  2+ Peripheral Pulses, No clubbing, cyanosis, or edema  PSYCH: AAOx3  NEUROLOGY: non-focal  SKIN: No rashes or lesions    LABS:                        7.4    61.47 )-----------( 82       ( 04 Jun 2017 06:00 )             23.3     06-04    141  |  96<L>  |  19  ----------------------------<  119<H>  3.2<L>   |  27  |  0.98    Ca    8.3<L>      04 Jun 2017 06:00  Phos  3.0     06-04  Mg     1.9     06-04    RADIOLOGY & ADDITIONAL TESTS:    Imaging Personally Reviewed:    Consultant(s) Notes Reviewed:      Care Discussed with Consultants/Other Providers:

## 2017-06-05 DIAGNOSIS — J47.9 BRONCHIECTASIS, UNCOMPLICATED: ICD-10-CM

## 2017-06-05 LAB
ANISOCYTOSIS BLD QL: SLIGHT — SIGNIFICANT CHANGE UP
BACTERIA BLD CULT: SIGNIFICANT CHANGE UP
BACTERIA BLD CULT: SIGNIFICANT CHANGE UP
BASOPHILS NFR SPEC: 0 % — SIGNIFICANT CHANGE UP (ref 0–2)
BLASTS # FLD: 0 % — SIGNIFICANT CHANGE UP (ref 0–0)
BUN SERPL-MCNC: 18 MG/DL — SIGNIFICANT CHANGE UP (ref 7–23)
CALCIUM SERPL-MCNC: 8.6 MG/DL — SIGNIFICANT CHANGE UP (ref 8.4–10.5)
CHLORIDE SERPL-SCNC: 98 MMOL/L — SIGNIFICANT CHANGE UP (ref 98–107)
CO2 SERPL-SCNC: 22 MMOL/L — SIGNIFICANT CHANGE UP (ref 22–31)
CREAT SERPL-MCNC: 0.93 MG/DL — SIGNIFICANT CHANGE UP (ref 0.5–1.3)
EOSINOPHIL NFR FLD: 0 % — SIGNIFICANT CHANGE UP (ref 0–6)
GLUCOSE SERPL-MCNC: 106 MG/DL — HIGH (ref 70–99)
HCT VFR BLD CALC: 23.8 % — LOW (ref 39–50)
HGB BLD-MCNC: 7.3 G/DL — LOW (ref 13–17)
LYMPHOCYTES NFR SPEC AUTO: 80.9 % — HIGH (ref 13–44)
MACROCYTES BLD QL: SLIGHT — SIGNIFICANT CHANGE UP
MAGNESIUM SERPL-MCNC: 1.8 MG/DL — SIGNIFICANT CHANGE UP (ref 1.6–2.6)
MCHC RBC-ENTMCNC: 30.7 % — LOW (ref 32–36)
MCHC RBC-ENTMCNC: 30.8 PG — SIGNIFICANT CHANGE UP (ref 27–34)
MCV RBC AUTO: 100.4 FL — HIGH (ref 80–100)
METAMYELOCYTES # FLD: 0 % — SIGNIFICANT CHANGE UP (ref 0–1)
MICROCYTES BLD QL: SLIGHT — SIGNIFICANT CHANGE UP
MONOCYTES NFR BLD: 1.7 % — LOW (ref 2–9)
MYELOCYTES NFR BLD: 0 % — SIGNIFICANT CHANGE UP (ref 0–0)
NEUTROPHIL AB SER-ACNC: 15.7 % — LOW (ref 43–77)
NEUTS BAND # BLD: 0 % — SIGNIFICANT CHANGE UP (ref 0–6)
OTHER - HEMATOLOGY %: 0 — SIGNIFICANT CHANGE UP
PHOSPHATE SERPL-MCNC: 3 MG/DL — SIGNIFICANT CHANGE UP (ref 2.5–4.5)
PLATELET # BLD AUTO: 113 K/UL — LOW (ref 150–400)
PLATELET COUNT - ESTIMATE: SIGNIFICANT CHANGE UP
PMV BLD: 10.2 FL — SIGNIFICANT CHANGE UP (ref 7–13)
POLYCHROMASIA BLD QL SMEAR: SLIGHT — SIGNIFICANT CHANGE UP
POTASSIUM SERPL-MCNC: 4.3 MMOL/L — SIGNIFICANT CHANGE UP (ref 3.5–5.3)
POTASSIUM SERPL-SCNC: 4.3 MMOL/L — SIGNIFICANT CHANGE UP (ref 3.5–5.3)
PROMYELOCYTES # FLD: 0 % — SIGNIFICANT CHANGE UP (ref 0–0)
RBC # BLD: 2.37 M/UL — LOW (ref 4.2–5.8)
RBC # FLD: 18.1 % — HIGH (ref 10.3–14.5)
SODIUM SERPL-SCNC: 138 MMOL/L — SIGNIFICANT CHANGE UP (ref 135–145)
VANCOMYCIN TROUGH SERPL-MCNC: 11.3 UG/ML — SIGNIFICANT CHANGE UP (ref 10–20)
VARIANT LYMPHS # BLD: 1.7 % — SIGNIFICANT CHANGE UP
WBC # BLD: 76.21 K/UL — CRITICAL HIGH (ref 3.8–10.5)
WBC # FLD AUTO: 76.21 K/UL — CRITICAL HIGH (ref 3.8–10.5)

## 2017-06-05 PROCEDURE — 99223 1ST HOSP IP/OBS HIGH 75: CPT | Mod: GC

## 2017-06-05 PROCEDURE — 99233 SBSQ HOSP IP/OBS HIGH 50: CPT | Mod: GC

## 2017-06-05 RX ORDER — POTASSIUM CHLORIDE 20 MEQ
10 PACKET (EA) ORAL
Qty: 0 | Refills: 0 | Status: DISCONTINUED | OUTPATIENT
Start: 2017-06-05 | End: 2017-06-05

## 2017-06-05 RX ADMIN — Medication 3 MILLILITER(S): at 21:19

## 2017-06-05 RX ADMIN — Medication 300 MILLIGRAM(S): at 13:28

## 2017-06-05 RX ADMIN — Medication 1 TABLET(S): at 18:28

## 2017-06-05 RX ADMIN — AMLODIPINE BESYLATE 10 MILLIGRAM(S): 2.5 TABLET ORAL at 06:53

## 2017-06-05 RX ADMIN — Medication 3 MILLILITER(S): at 03:07

## 2017-06-05 RX ADMIN — Medication 3 MILLILITER(S): at 15:30

## 2017-06-05 RX ADMIN — Medication 1 MILLIGRAM(S): at 13:28

## 2017-06-05 RX ADMIN — Medication 250 MILLIGRAM(S): at 13:29

## 2017-06-05 RX ADMIN — ATORVASTATIN CALCIUM 20 MILLIGRAM(S): 80 TABLET, FILM COATED ORAL at 22:08

## 2017-06-05 RX ADMIN — HEPARIN SODIUM 5000 UNIT(S): 5000 INJECTION INTRAVENOUS; SUBCUTANEOUS at 18:28

## 2017-06-05 RX ADMIN — PIPERACILLIN AND TAZOBACTAM 25 GRAM(S): 4; .5 INJECTION, POWDER, LYOPHILIZED, FOR SOLUTION INTRAVENOUS at 00:41

## 2017-06-05 RX ADMIN — PIPERACILLIN AND TAZOBACTAM 25 GRAM(S): 4; .5 INJECTION, POWDER, LYOPHILIZED, FOR SOLUTION INTRAVENOUS at 18:27

## 2017-06-05 RX ADMIN — Medication 100 MICROGRAM(S): at 06:53

## 2017-06-05 RX ADMIN — FINASTERIDE 5 MILLIGRAM(S): 5 TABLET, FILM COATED ORAL at 13:28

## 2017-06-05 RX ADMIN — PIPERACILLIN AND TAZOBACTAM 25 GRAM(S): 4; .5 INJECTION, POWDER, LYOPHILIZED, FOR SOLUTION INTRAVENOUS at 09:34

## 2017-06-05 RX ADMIN — HEPARIN SODIUM 5000 UNIT(S): 5000 INJECTION INTRAVENOUS; SUBCUTANEOUS at 06:53

## 2017-06-05 RX ADMIN — Medication 1 TABLET(S): at 06:53

## 2017-06-05 RX ADMIN — Medication 3 MILLILITER(S): at 10:44

## 2017-06-05 NOTE — PROGRESS NOTE ADULT - PROBLEM SELECTOR PLAN 1
Patient presenting with hypocalcemia. Unlikely due to tumor lysis given normal phosphorous, normal potassium. Pt with low Vitamin D level in the past.  -Oscal started, weekly 50,000 IU Vit D supplementation initiated  - will replete w/ IV calcium as needed

## 2017-06-05 NOTE — PROGRESS NOTE ADULT - SUBJECTIVE AND OBJECTIVE BOX
Date of Admission:    24H hour events:     MEDICATIONS:  heparin  Injectable 5000Unit(s) SubCutaneous every 12 hours  amLODIPine   Tablet 10milliGRAM(s) Oral daily    piperacillin/tazobactam IVPB. 3.375Gram(s) IV Intermittent every 8 hours  vancomycin  IVPB  IV Intermittent   vancomycin  IVPB 1000milliGRAM(s) IV Intermittent every 24 hours  azithromycin  IVPB 500milliGRAM(s) IV Intermittent every 24 hours    ALBUTerol/ipratropium for Nebulization 3milliLiter(s) Nebulizer every 6 hours  guaiFENesin    Syrup 200milliGRAM(s) Oral every 6 hours PRN        finasteride 5milliGRAM(s) Oral daily  atorvastatin 20milliGRAM(s) Oral at bedtime  levothyroxine 100MICROGram(s) Oral daily  allopurinol 300milliGRAM(s) Oral daily    folic acid 1milliGRAM(s) Oral daily  calcium carbonate 1250 mG + Vitamin D (OsCal 500 + D) 1Tablet(s) Oral two times a day  calcium gluconate IVPB 1Gram(s) IV Intermittent every 1 hour  ergocalciferol [id number](s) Oral every week      REVIEW OF SYSTEMS:  Complete 10point ROS negative.    PHYSICAL EXAM:  T(C): 37.4, Max: 37.4 (06-05 @ 05:52)  HR: 88 (73 - 88)  BP: 152/57 (132/61 - 152/64)  RR: 16 (16 - 18)  SpO2: 100% (98% - 100%)  Wt(kg): --  I&O's Summary    I & Os for current day (as of 05 Jun 2017 07:39)  =============================================  IN: 350 ml / OUT: 930 ml / NET: -580 ml      Appearance: Normal	  HEENT:   Normal oral mucosa, PERRL, EOMI	  Lymphatic: No lymphadenopathy  Cardiovascular: Normal S1 S2, No JVD, No murmurs, No edema  Respiratory: Lungs clear to auscultation	  Psychiatry: A & O x 3, Mood & affect appropriate  Gastrointestinal:  Soft, Non-tender, + BS	  Skin: No rashes, No ecchymoses, No cyanosis	  Neurologic: Non-focal  Extremities: Normal range of motion, No clubbing, cyanosis or edema  Vascular: Peripheral pulses palpable 2+ bilaterally        LABS:	 	    CBC Full  -  ( 04 Jun 2017 06:00 )  WBC Count : 61.47 K/uL  Hemoglobin : 7.4 g/dL  Hematocrit : 23.3 %  Platelet Count - Automated : 82 K/uL  Mean Cell Volume : 99.6 fL  Mean Cell Hemoglobin : 31.6 pg  Mean Cell Hemoglobin Concentration : 31.8 %  Auto Neutrophil # : x  Auto Lymphocyte # : x  Auto Monocyte # : x  Auto Eosinophil # : x  Auto Basophil # : x  Auto Neutrophil % : x  Auto Lymphocyte % : x  Auto Monocyte % : x  Auto Eosinophil % : x  Auto Basophil % : x    06-04    141  |  96<L>  |  19  ----------------------------<  119<H>  3.2<L>   |  27  |  0.98    Ca    8.3<L>      04 Jun 2017 06:00  Phos  3.0     06-04  Mg     1.9     06-04 Patient is a 86y old  Male who presents with a chief complaint of Abnormal outpatient labs (01 Jun 2017 14:45)    24H hour events: No acute events ON. No episodes of resp distress on NC. Denies N/V,  chest pain, diarrhea or constipation    MEDICATIONS:  heparin  Injectable 5000Unit(s) SubCutaneous every 12 hours  amLODIPine   Tablet 10milliGRAM(s) Oral daily    piperacillin/tazobactam IVPB. 3.375Gram(s) IV Intermittent every 8 hours  vancomycin  IVPB  IV Intermittent   vancomycin  IVPB 1000milliGRAM(s) IV Intermittent every 24 hours  azithromycin  IVPB 500milliGRAM(s) IV Intermittent every 24 hours    ALBUTerol/ipratropium for Nebulization 3milliLiter(s) Nebulizer every 6 hours  guaiFENesin    Syrup 200milliGRAM(s) Oral every 6 hours PRN        finasteride 5milliGRAM(s) Oral daily  atorvastatin 20milliGRAM(s) Oral at bedtime  levothyroxine 100MICROGram(s) Oral daily  allopurinol 300milliGRAM(s) Oral daily    folic acid 1milliGRAM(s) Oral daily  calcium carbonate 1250 mG + Vitamin D (OsCal 500 + D) 1Tablet(s) Oral two times a day  calcium gluconate IVPB 1Gram(s) IV Intermittent every 1 hour  ergocalciferol 05200Toyd(s) Oral every week      REVIEW OF SYSTEMS:  Complete 10point ROS negative.    PHYSICAL EXAM:  T(C): 37.4, Max: 37.4 (06-05 @ 05:52)  HR: 88 (73 - 88)  BP: 152/57 (132/61 - 152/64)  RR: 16 (16 - 18)  SpO2: 100% (98% - 100%)  Wt(kg): --  I&O's Summary    I & Os for current day (as of 05 Jun 2017 07:39)  =============================================  IN: 350 ml / OUT: 930 ml / NET: -580 ml      Appearance: Normal	  HEENT:   Normal oral mucosa, PERRL, EOMI	  Lymphatic: No lymphadenopathy  Cardiovascular: Normal S1 S2, No JVD, No murmurs, No edema  Respiratory: Lungs clear to auscultation	  Psychiatry: A & O x 3, Mood & affect appropriate  Gastrointestinal:  Soft, Non-tender, + BS	  Skin: No rashes, No ecchymoses, No cyanosis	  Neurologic: Non-focal  Extremities: Normal range of motion, No clubbing, cyanosis or edema  Vascular: Peripheral pulses palpable 2+ bilaterally        LABS:	 	                          7.4    61.47 )-----------( 82       ( 04 Jun 2017 06:00 )             23.3     06-04    141  |  96<L>  |  19  ----------------------------<  119<H>  3.2<L>   |  27  |  0.98    Ca    8.3<L>      04 Jun 2017 06:00  Phos  3.0     06-04  Mg     1.9     06-04 Patient is a 86y old  Male who presents with a chief complaint of Abnormal outpatient labs (01 Jun 2017 14:45)    24H hour events: No acute events ON. No episodes of resp distress on NC. Denies N/V,  chest pain, diarrhea or constipation    MEDICATIONS:  heparin  Injectable 5000Unit(s) SubCutaneous every 12 hours  amLODIPine   Tablet 10milliGRAM(s) Oral daily    piperacillin/tazobactam IVPB. 3.375Gram(s) IV Intermittent every 8 hours  vancomycin  IVPB  IV Intermittent   vancomycin  IVPB 1000milliGRAM(s) IV Intermittent every 24 hours  azithromycin  IVPB 500milliGRAM(s) IV Intermittent every 24 hours    ALBUTerol/ipratropium for Nebulization 3milliLiter(s) Nebulizer every 6 hours  guaiFENesin    Syrup 200milliGRAM(s) Oral every 6 hours PRN        finasteride 5milliGRAM(s) Oral daily  atorvastatin 20milliGRAM(s) Oral at bedtime  levothyroxine 100MICROGram(s) Oral daily  allopurinol 300milliGRAM(s) Oral daily    folic acid 1milliGRAM(s) Oral daily  calcium carbonate 1250 mG + Vitamin D (OsCal 500 + D) 1Tablet(s) Oral two times a day  calcium gluconate IVPB 1Gram(s) IV Intermittent every 1 hour  ergocalciferol 62804Oord(s) Oral every week      REVIEW OF SYSTEMS:  Complete 10point ROS negative.    PHYSICAL EXAM:  T(C): 37.4, Max: 37.4 (06-05 @ 05:52)  HR: 88 (73 - 88)  BP: 152/57 (132/61 - 152/64)  RR: 16 (16 - 18)  SpO2: 100% (98% - 100%)  Wt(kg): --  I&O's Summary    I & Os for current day (as of 05 Jun 2017 07:39)  =============================================  IN: 350 ml / OUT: 930 ml / NET: -580 ml      Appearance: Normal	  HEENT:   Normal oral mucosa, PERRL, EOMI	  Lymphatic: No lymphadenopathy  Cardiovascular: Normal S1 S2, No JVD, No murmurs, No edema  Respiratory: Lungs clear to auscultation	  Psychiatry: A & O x 3, Mood & affect appropriate  Gastrointestinal:  Soft, Non-tender, + BS	  Skin: No rashes, No ecchymoses, No cyanosis	  Neurologic: Non-focal  Extremities: Normal range of motion, No clubbing, cyanosis or edema  Vascular: Peripheral pulses palpable 2+ bilaterally        LABS:	 	        06-05    138  |  98  |  18  ----------------------------<  106<H>  4.3   |  22  |  0.93    Ca    8.6      05 Jun 2017 05:38  Phos  3.0     06-05  Mg     1.8     06-05 Patient is a 86y old  Male who presents with a chief complaint of Abnormal outpatient labs (01 Jun 2017 14:45)    24H hour events: No acute events ON. No episodes of resp distress on NC. Denies N/V,  chest pain, diarrhea or constipation    MEDICATIONS:  heparin  Injectable 5000Unit(s) SubCutaneous every 12 hours  amLODIPine   Tablet 10milliGRAM(s) Oral daily    piperacillin/tazobactam IVPB. 3.375Gram(s) IV Intermittent every 8 hours  vancomycin  IVPB  IV Intermittent   vancomycin  IVPB 1000milliGRAM(s) IV Intermittent every 24 hours  azithromycin  IVPB 500milliGRAM(s) IV Intermittent every 24 hours    ALBUTerol/ipratropium for Nebulization 3milliLiter(s) Nebulizer every 6 hours  guaiFENesin    Syrup 200milliGRAM(s) Oral every 6 hours PRN        finasteride 5milliGRAM(s) Oral daily  atorvastatin 20milliGRAM(s) Oral at bedtime  levothyroxine 100MICROGram(s) Oral daily  allopurinol 300milliGRAM(s) Oral daily    folic acid 1milliGRAM(s) Oral daily  calcium carbonate 1250 mG + Vitamin D (OsCal 500 + D) 1Tablet(s) Oral two times a day  calcium gluconate IVPB 1Gram(s) IV Intermittent every 1 hour  ergocalciferol [id number](s) Oral every week      REVIEW OF SYSTEMS:  Complete 10point ROS negative.    PHYSICAL EXAM:  T(C): 37.4, Max: 37.4 (06-05 @ 05:52)  HR: 88 (73 - 88)  BP: 152/57 (132/61 - 152/64)  RR: 16 (16 - 18)  SpO2: 100% (98% - 100%)  Wt(kg): --  I&O's Summary    I & Os for current day (as of 05 Jun 2017 07:39)  =============================================  IN: 350 ml / OUT: 930 ml / NET: -580 ml      Appearance: Normal	  HEENT:   Normal oral mucosa, PERRL, EOMI	  Lymphatic: No lymphadenopathy  Cardiovascular: Normal S1 S2, No JVD, No murmurs, No edema  Respiratory: rhonchi b/l with mild crackles at bases   Psychiatry: A & O x 3, Mood & affect appropriate  Gastrointestinal:  Soft, Non-tender, + BS	  Skin: No rashes, No ecchymoses, No cyanosis	  Neurologic: Non-focal  Extremities: Normal range of motion, No clubbing, cyanosis or edema  Vascular: Peripheral pulses palpable 2+ bilaterally        LABS:	 	        06-05    138  |  98  |  18  ----------------------------<  106<H>  4.3   |  22  |  0.93    Ca    8.6      05 Jun 2017 05:38  Phos  3.0     06-05  Mg     1.8     06-05 Patient is a 86y old Male who presents with a chief complaint of Abnormal outpatient labs (01 Jun 2017 14:45)    24H hour events: No acute events ON. No episodes of resp distress on NC. Denies N/V,  chest pain, diarrhea or constipation    MEDICATIONS:  heparin  Injectable 5000Unit(s) SubCutaneous every 12 hours  amLODIPine   Tablet 10milliGRAM(s) Oral daily    piperacillin/tazobactam IVPB. 3.375Gram(s) IV Intermittent every 8 hours  vancomycin  IVPB  IV Intermittent   vancomycin  IVPB 1000milliGRAM(s) IV Intermittent every 24 hours  azithromycin  IVPB 500milliGRAM(s) IV Intermittent every 24 hours    ALBUTerol/ipratropium for Nebulization 3milliLiter(s) Nebulizer every 6 hours  guaiFENesin    Syrup 200milliGRAM(s) Oral every 6 hours PRN    finasteride 5milliGRAM(s) Oral daily  atorvastatin 20milliGRAM(s) Oral at bedtime  levothyroxine 100MICROGram(s) Oral daily  allopurinol 300milliGRAM(s) Oral daily    folic acid 1milliGRAM(s) Oral daily  calcium carbonate 1250 mG + Vitamin D (OsCal 500 + D) 1Tablet(s) Oral two times a day  calcium gluconate IVPB 1Gram(s) IV Intermittent every 1 hour  ergocalciferol [id number](s) Oral every week      REVIEW OF SYSTEMS:  Complete 10point ROS negative.    PHYSICAL EXAM:  T(C): 37.4, Max: 37.4 (06-05 @ 05:52)  HR: 88 (73 - 88)  BP: 152/57 (132/61 - 152/64)  RR: 16 (16 - 18)  SpO2: 100% (98% - 100%)  Wt(kg): --  I&O's Summary    I & Os for current day (as of 05 Jun 2017 07:39)  =============================================  IN: 350 ml / OUT: 930 ml / NET: -580 ml      Appearance: Normal	  HEENT:   Normal oral mucosa, PERRL, EOMI	  Lymphatic: No lymphadenopathy  Cardiovascular: Normal S1 S2, No JVD, No murmurs, No edema  Respiratory: rhonchi b/l with mild crackles at bases   Psychiatry: A & O x 3, Mood & affect appropriate  Gastrointestinal:  Soft, Non-tender, + BS	  Skin: No rashes, No ecchymoses, No cyanosis; mild erythema on LUE in area of old PIV	  Neurologic: Non-focal  Extremities: Normal range of motion, No clubbing, cyanosis or edema  Vascular: Peripheral pulses palpable 2+ bilaterally        LABS:	 	        06-05    138  |  98  |  18  ----------------------------<  106<H>  4.3   |  22  |  0.93    Ca    8.6      05 Jun 2017 05:38  Phos  3.0     06-05  Mg     1.8     06-05

## 2017-06-05 NOTE — PROGRESS NOTE ADULT - ASSESSMENT
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

## 2017-06-05 NOTE — PROGRESS NOTE ADULT - PROBLEM SELECTOR PLAN 1
Resolved. Was likely in the setting of flash pulmonary edema 2/2 blood transfusion and resolved with diuresis and bipap.  - continue to monitor respiratory status  - f/u sputum culture, blood cultures  - c/w antibiotics per primary team  - further recs to follow Resolved. Was likely in the setting of flash pulmonary edema 2/2 blood transfusion and resolved with diuresis and bipap.  - continue to monitor respiratory status  - f/u sputum culture, blood cultures  - c/w antibiotics per primary team

## 2017-06-05 NOTE — PROGRESS NOTE ADULT - SUBJECTIVE AND OBJECTIVE BOX
CHIEF COMPLAINT: Chronic Pleural Effusion    Interval Events: No acute events. Patient feels his shortness of breath is improved and he is back at his baseline breathing. Patient tolerated blood transfusion well on 6/3. Otherwise, patient denies having any other complaints.    REVIEW OF SYSTEMS:  Constitutional: No fevers, chills  Eyes: No blurry vision  ENT: No sore throat, no rhinorrhea  CV: No chest pain, no palpitations  Resp: +Cough - unchanged from prior (chronic), No dyspnea, no wheezing,   GI: No abdominal pain, no nausea/vomiting  : No dysuria, no hematuria  Neurological: No confusion, no lightheadedness  [X] All other systems negative  [ ] Unable to assess ROS because ________    OBJECTIVE:  ICU Vital Signs Last 24 Hrs  T(C): 37.4, Max: 37.4 (06-05 @ 05:52)  T(F): 99.4, Max: 99.4 (06-05 @ 05:52)  HR: 76 (73 - 88)  BP: 152/57 (132/61 - 152/64)  BP(mean): --  ABP: --  ABP(mean): --  RR: 16 (16 - 18)  SpO2: 100% (98% - 100%)        I & Os for current day (as of 06-05 @ 11:22)  =============================================  IN: 350 ml / OUT: 1110 ml / NET: -760 ml    CAPILLARY BLOOD GLUCOSE      PHYSICAL EXAM:  General: NAD, Sitting up comfortably  HEENT: PERRL, MMM  Lymph Nodes: No cervical or occipital LAD  Neck: Supple, No JVD  Respiratory: Coarse breath sounds bilaterally, no rhonchi, wheezes, rales  Cardiovascular: RRR  Abdomen: Soft, NT/ND  Extremities: No lower extremity pitting edema b/l  Skin: No rashes  Neurological: Non-focal exam    HOSPITAL MEDICATIONS:  MEDICATIONS  (STANDING):  heparin  Injectable 5000Unit(s) SubCutaneous every 12 hours  ALBUTerol/ipratropium for Nebulization 3milliLiter(s) Nebulizer every 6 hours  finasteride 5milliGRAM(s) Oral daily  atorvastatin 20milliGRAM(s) Oral at bedtime  levothyroxine 100MICROGram(s) Oral daily  folic acid 1milliGRAM(s) Oral daily  allopurinol 300milliGRAM(s) Oral daily  calcium carbonate 1250 mG + Vitamin D (OsCal 500 + D) 1Tablet(s) Oral two times a day  calcium gluconate IVPB 1Gram(s) IV Intermittent every 1 hour  ergocalciferol 42922Yiyc(s) Oral every week  amLODIPine   Tablet 10milliGRAM(s) Oral daily  piperacillin/tazobactam IVPB. 3.375Gram(s) IV Intermittent every 8 hours  vancomycin  IVPB  IV Intermittent   vancomycin  IVPB 1000milliGRAM(s) IV Intermittent every 24 hours  azithromycin  IVPB 500milliGRAM(s) IV Intermittent every 24 hours    MEDICATIONS  (PRN):  guaiFENesin    Syrup 200milliGRAM(s) Oral every 6 hours PRN Cough      LABS:                        7.3    76.21 )-----------( 113      ( 05 Jun 2017 10:20 )             23.8     06-05    138  |  98  |  18  ----------------------------<  106<H>  4.3   |  22  |  0.93    Ca    8.6      05 Jun 2017 05:38  Phos  3.0     06-05  Mg     1.8     06-05    MICROBIOLOGY:   BCx x 2 (5/31) - Negative    RADIOLOGY:  No new imaging    PULMONARY FUNCTION TESTS:

## 2017-06-05 NOTE — PROGRESS NOTE ADULT - PROBLEM SELECTOR PLAN 3
Repeat CXR redemonstrated pleural effusion  - antibiotics as above  - Likely residual from old multifocal pneumonia; showing some improvement  - Pt follows with Dr. Viveros. Repeat CXR redemonstrated pleural effusion  - antibiotics as above  - Likely residual from old multifocal pneumonia; showing some improvement  - f/u further pulm recs

## 2017-06-05 NOTE — PROGRESS NOTE ADULT - PROBLEM SELECTOR PLAN 4
S/p RCD ~1 month ago. Follows with Dr. Lux Mathew as outpatient.  Monitor CBC and transfuse as needed

## 2017-06-05 NOTE — PROGRESS NOTE ADULT - ASSESSMENT
85 yo Male with h/o CLL c/b relapsed warm AIHA s/p chemotherapy with rituximab, cyclophosphamide, and dexamethasone (last tx ~1 month ago), HTN, HLD, hypothyroidism, BPH, bronchiectasis, RLL PNA c/b parapneumonic effusion s/p CT and MIST II tx protocol, and recent admission (5/1-5/9/17) for LLL PNA who is presenting with hypocalcemia w/ due to vitamin D deficiency. Admission c/b respiratory distress following PRBC transfusion likely 2/2 fluid overload necessitating diuretic tx and intermittent BIPAP. 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 Admission c/b respiratory distress following PRBC transfusion likely 2/2 fluid overload necessitating diuretic tx and intermittent BIPAP.

## 2017-06-05 NOTE — PROGRESS NOTE ADULT - PROBLEM SELECTOR PLAN 2
Patient's respiratory status is improved - comfortable on nasal cannula  - likely in the setting of volume overload from blood transfusion  - will c/w empiric antibiotics with Vanc and Zosyn (Day 3)  - will wean nasal cannula as tolerated Patient's respiratory status is improved - comfortable on nasal cannula  - likely in the setting of volume overload from blood transfusion  - will c/w empiric antibiotics with Vanc and Zosyn (Day 3) for possible pna component given elevated procal on 6/2- Will likely proceed with 5 d course   - will wean nasal cannula as tolerated  -F/u further pulm recs

## 2017-06-05 NOTE — PROGRESS NOTE ADULT - PROBLEM SELECTOR PLAN 2
- f/u sputum cultures  - patient will follow up as outpatient  - further recs to follow; note incomplete - f/u sputum cultures  - patient will follow up as outpatient

## 2017-06-06 DIAGNOSIS — D64.9 ANEMIA, UNSPECIFIED: ICD-10-CM

## 2017-06-06 LAB
ANISOCYTOSIS BLD QL: SLIGHT — SIGNIFICANT CHANGE UP
BASOPHILS NFR SPEC: 0 % — SIGNIFICANT CHANGE UP (ref 0–2)
BLASTS # FLD: 0 % — SIGNIFICANT CHANGE UP (ref 0–0)
BLD GP AB SCN SERPL QL: POSITIVE — SIGNIFICANT CHANGE UP
BUN SERPL-MCNC: 20 MG/DL — SIGNIFICANT CHANGE UP (ref 7–23)
CALCIUM SERPL-MCNC: 8.6 MG/DL — SIGNIFICANT CHANGE UP (ref 8.4–10.5)
CHLORIDE SERPL-SCNC: 102 MMOL/L — SIGNIFICANT CHANGE UP (ref 98–107)
CO2 SERPL-SCNC: 25 MMOL/L — SIGNIFICANT CHANGE UP (ref 22–31)
CREAT SERPL-MCNC: 1.03 MG/DL — SIGNIFICANT CHANGE UP (ref 0.5–1.3)
DACRYOCYTES BLD QL SMEAR: SLIGHT — SIGNIFICANT CHANGE UP
DIRECT COOMBS IGG: POSITIVE — SIGNIFICANT CHANGE UP
EOSINOPHIL NFR FLD: 1.7 % — SIGNIFICANT CHANGE UP (ref 0–6)
GLUCOSE SERPL-MCNC: 115 MG/DL — HIGH (ref 70–99)
HCT VFR BLD CALC: 20.5 % — CRITICAL LOW (ref 39–50)
HGB BLD-MCNC: 6.6 G/DL — CRITICAL LOW (ref 13–17)
LYMPHOCYTES NFR SPEC AUTO: 73 % — HIGH (ref 13–44)
MACROCYTES BLD QL: SLIGHT — SIGNIFICANT CHANGE UP
MAGNESIUM SERPL-MCNC: 1.6 MG/DL — SIGNIFICANT CHANGE UP (ref 1.6–2.6)
MANUAL SMEAR VERIFICATION: SIGNIFICANT CHANGE UP
MCHC RBC-ENTMCNC: 32.2 % — SIGNIFICANT CHANGE UP (ref 32–36)
MCHC RBC-ENTMCNC: 32.7 PG — SIGNIFICANT CHANGE UP (ref 27–34)
MCV RBC AUTO: 101.5 FL — HIGH (ref 80–100)
METAMYELOCYTES # FLD: 0 % — SIGNIFICANT CHANGE UP (ref 0–1)
MONOCYTES NFR BLD: 4.3 % — SIGNIFICANT CHANGE UP (ref 2–9)
MYELOCYTES NFR BLD: 0 % — SIGNIFICANT CHANGE UP (ref 0–0)
NEUTROPHIL AB SER-ACNC: 17.4 % — LOW (ref 43–77)
NEUTS BAND # BLD: 0.9 % — SIGNIFICANT CHANGE UP (ref 0–6)
OTHER - HEMATOLOGY %: 0.9 — SIGNIFICANT CHANGE UP
PHOSPHATE SERPL-MCNC: 3.5 MG/DL — SIGNIFICANT CHANGE UP (ref 2.5–4.5)
PLATELET # BLD AUTO: 113 K/UL — LOW (ref 150–400)
PLATELET COUNT - ESTIMATE: SIGNIFICANT CHANGE UP
PMV BLD: 9.7 FL — SIGNIFICANT CHANGE UP (ref 7–13)
POLYCHROMASIA BLD QL SMEAR: SLIGHT — SIGNIFICANT CHANGE UP
POTASSIUM SERPL-MCNC: 4.2 MMOL/L — SIGNIFICANT CHANGE UP (ref 3.5–5.3)
POTASSIUM SERPL-SCNC: 4.2 MMOL/L — SIGNIFICANT CHANGE UP (ref 3.5–5.3)
PROMYELOCYTES # FLD: 0 % — SIGNIFICANT CHANGE UP (ref 0–0)
RBC # BLD: 2.02 M/UL — LOW (ref 4.2–5.8)
RBC # FLD: 18 % — HIGH (ref 10.3–14.5)
RH IG SCN BLD-IMP: POSITIVE — SIGNIFICANT CHANGE UP
SODIUM SERPL-SCNC: 142 MMOL/L — SIGNIFICANT CHANGE UP (ref 135–145)
VARIANT LYMPHS # BLD: 1.7 % — SIGNIFICANT CHANGE UP
WBC # BLD: 60.47 K/UL — CRITICAL HIGH (ref 3.8–10.5)
WBC # FLD AUTO: 60.47 K/UL — CRITICAL HIGH (ref 3.8–10.5)

## 2017-06-06 PROCEDURE — 99233 SBSQ HOSP IP/OBS HIGH 50: CPT | Mod: GC

## 2017-06-06 PROCEDURE — 86079 PHYS BLOOD BANK SERV AUTHRJ: CPT

## 2017-06-06 RX ORDER — FUROSEMIDE 40 MG
40 TABLET ORAL ONCE
Qty: 0 | Refills: 0 | Status: COMPLETED | OUTPATIENT
Start: 2017-06-06 | End: 2017-06-06

## 2017-06-06 RX ADMIN — Medication 40 MILLIGRAM(S): at 11:01

## 2017-06-06 RX ADMIN — Medication 3 MILLILITER(S): at 21:21

## 2017-06-06 RX ADMIN — PIPERACILLIN AND TAZOBACTAM 25 GRAM(S): 4; .5 INJECTION, POWDER, LYOPHILIZED, FOR SOLUTION INTRAVENOUS at 02:23

## 2017-06-06 RX ADMIN — PIPERACILLIN AND TAZOBACTAM 25 GRAM(S): 4; .5 INJECTION, POWDER, LYOPHILIZED, FOR SOLUTION INTRAVENOUS at 18:27

## 2017-06-06 RX ADMIN — Medication 3 MILLILITER(S): at 15:50

## 2017-06-06 RX ADMIN — Medication 200 MILLIGRAM(S): at 05:53

## 2017-06-06 RX ADMIN — Medication 200 MILLIGRAM(S): at 13:21

## 2017-06-06 RX ADMIN — ATORVASTATIN CALCIUM 20 MILLIGRAM(S): 80 TABLET, FILM COATED ORAL at 21:55

## 2017-06-06 RX ADMIN — PIPERACILLIN AND TAZOBACTAM 25 GRAM(S): 4; .5 INJECTION, POWDER, LYOPHILIZED, FOR SOLUTION INTRAVENOUS at 10:57

## 2017-06-06 RX ADMIN — Medication 1 TABLET(S): at 18:15

## 2017-06-06 RX ADMIN — HEPARIN SODIUM 5000 UNIT(S): 5000 INJECTION INTRAVENOUS; SUBCUTANEOUS at 05:52

## 2017-06-06 RX ADMIN — AMLODIPINE BESYLATE 10 MILLIGRAM(S): 2.5 TABLET ORAL at 05:52

## 2017-06-06 RX ADMIN — FINASTERIDE 5 MILLIGRAM(S): 5 TABLET, FILM COATED ORAL at 13:19

## 2017-06-06 RX ADMIN — Medication 1 MILLIGRAM(S): at 13:18

## 2017-06-06 RX ADMIN — Medication 1 TABLET(S): at 05:52

## 2017-06-06 RX ADMIN — Medication 300 MILLIGRAM(S): at 13:18

## 2017-06-06 RX ADMIN — Medication 100 MICROGRAM(S): at 05:52

## 2017-06-06 RX ADMIN — Medication 3 MILLILITER(S): at 03:31

## 2017-06-06 RX ADMIN — Medication 40 MILLIGRAM(S): at 18:44

## 2017-06-06 RX ADMIN — HEPARIN SODIUM 5000 UNIT(S): 5000 INJECTION INTRAVENOUS; SUBCUTANEOUS at 18:16

## 2017-06-06 RX ADMIN — Medication 3 MILLILITER(S): at 09:51

## 2017-06-06 RX ADMIN — Medication 250 MILLIGRAM(S): at 13:25

## 2017-06-06 NOTE — PROGRESS NOTE ADULT - PROBLEM SELECTOR PLAN 3
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   Hemoglobin 6.6 will transfuse 1/2 units x 2 with lasix   - trend cbc q 24 hrs   - f/u outpatient with heme

## 2017-06-06 NOTE — PROGRESS NOTE ADULT - PROBLEM SELECTOR PLAN 2
Patient's respiratory status is improved - comfortable on nasal cannula  - likely in the setting of volume overload from blood transfusion  - will c/w empiric antibiotics with Vanc and Zosyn (Day 4/5) for possible pna component given elevated procal on 6/2- Will likely proceed with 5 d course   - will wean nasal cannula as tolerated  -F/u further pulm recs

## 2017-06-06 NOTE — PROGRESS NOTE ADULT - PROBLEM SELECTOR PLAN 1
Patient presenting with hypocalcemia. Unlikely due to tumor lysis given normal phosphorous, normal potassium. Pt with low Vitamin D level in the past.  -Oscal started, weekly 50,000 IU Vit D supplementation initiated  - will replete w/ IV calcium as needed   - calcium improved

## 2017-06-06 NOTE — PROGRESS NOTE ADULT - PROBLEM SELECTOR PLAN 4
Repeat CXR redemonstrated pleural effusion  - antibiotics as above  - Likely residual from old multifocal pneumonia; showing some improvement  - f/u further pulm recs

## 2017-06-06 NOTE — PROGRESS NOTE ADULT - SUBJECTIVE AND OBJECTIVE BOX
Patient is a 86y old  Male who presents with a chief complaint of Abnormal outpatient labs (01 Jun 2017 14:45)    HPI:  85 yo Male with h/o CLL c/b relapsed warm AIHA s/p chemotherapy with rituximab, cyclophosphamide, and dexamethasone (last tx ~1 month ago), HTN, HLD, hypothyroidism, BPH, bronchiectasis, RLL PNA c/b parapneumonic effusion s/p CT and MIST II tx protocol, and recent admission (5/1-5/9/17) for LLL PNA presents after being referred to the ED by pt's pulmonologist after routine outpatient labs drawn on 5/25/17 revealed Ca 6.1. w/ hospital course complicated by acute pulmonary edema during blood transfusion.     SUBJECTIVE / OVERNIGHT EVENTS:  afebrile, SOB mildly improved, no abdominal pain or chest pain     MEDICATIONS  (STANDING):  heparin  Injectable 5000Unit(s) SubCutaneous every 12 hours  ALBUTerol/ipratropium for Nebulization 3milliLiter(s) Nebulizer every 6 hours  finasteride 5milliGRAM(s) Oral daily  atorvastatin 20milliGRAM(s) Oral at bedtime  levothyroxine 100MICROGram(s) Oral daily  folic acid 1milliGRAM(s) Oral daily  allopurinol 300milliGRAM(s) Oral daily  calcium carbonate 1250 mG + Vitamin D (OsCal 500 + D) 1Tablet(s) Oral two times a day  calcium gluconate IVPB 1Gram(s) IV Intermittent every 1 hour  ergocalciferol 69675Jepl(s) Oral every week  amLODIPine   Tablet 10milliGRAM(s) Oral daily  piperacillin/tazobactam IVPB. 3.375Gram(s) IV Intermittent every 8 hours  vancomycin  IVPB  IV Intermittent   vancomycin  IVPB 1000milliGRAM(s) IV Intermittent every 24 hours  furosemide   Injectable 40milliGRAM(s) IV Push once  furosemide   Injectable 40milliGRAM(s) IV Push once    MEDICATIONS  (PRN):  guaiFENesin    Syrup 200milliGRAM(s) Oral every 6 hours PRN Cough      Vital Signs Last 24 Hrs  T(C): 37, Max: 37 (06-06 @ 05:34)  HR: 75 (66 - 84)  BP: 137/53 (137/53 - 145/52)  RR: 18 (17 - 18)  SpO2: 95% (93% - 98%)  Wt(kg): --  CAPILLARY BLOOD GLUCOSE    I&O's Summary    I & Os for current day (as of 06 Jun 2017 08:09)  =============================================  IN: 220 ml / OUT: 280 ml / NET: -60 ml      PHYSICAL EXAM:  GENERAL: NAD, well-developed  HEAD:  Atraumatic, Normocephalic  EYES: EOMI, PERRLA, conjunctiva and sclera clear  NECK: Supple, No JVD  CHEST/LUNG: course BS in Right lung base, diminished BS in bases.   HEART: Regular rate and rhythm; No murmurs, rubs, or gallops  ABDOMEN: Soft, Nontender, Nondistended; Bowel sounds present  EXTREMITIES:  2+ Peripheral Pulses, No clubbing, cyanosis, or edema  PSYCH: AAOx3  NEUROLOGY: non-focal    LABS:  (06-06 @ 06:10)                        6.6  60.47 )-----------( 113                 20.5    Neutrophils = -- (--%)  Lymphocytes = -- (--%)  Eosinophils = -- (--%)  Basophils = -- (--%)  Monocytes = -- (--%)  Bands = --%    WBC Trend: 60.47<--, 76.21<--, 61.47<--  Hb Trend: 6.6<--, 7.3<--, 7.4<--, 6.4<--, 7.1<--  Plt Trend: 113<--, 113<--, 82<--, 73<--, 90<--  06-05    138  |  98  |  18  ----------------------------<  106<H>  4.3   |  22  |  0.93    Ca    8.6      05 Jun 2017 05:38  Phos  3.0     06-05  Mg     1.8     06-05      Creatinine Trend: 0.93<--, 0.98<--, 0.96<--, 1.17<--, 1.13<--, 1.12<--          Microbiology:  Urine Cx:  Blood Cx:    RADIOLOGY & ADDITIONAL TESTS:  X- Ray: Jun 2 2017         INTERPRETATION:  CLINICAL INFORMATION: Pulmonary edema, right lower lobe   crackles despite BiPAP. Shortness of breath.    EXAM: Frontal radiograph of the chest from June 2, 2017.    COMPARISON: Chest radiograph from June 1, 2017.    FINDINGS:  Small bilateral pleural effusions, unchanged. Interstitial edema,   slightly improved from prior study. No pneumothorax.    IMPRESSION: Interstitial edema, slightly improved from prior study. Small   bilateral pleural effusions.  CT:  Ultrasound:  [ ] imaging personally reviewed and interpreted by me    Consultant(s) Notes Reviewed:  PULM     Care Discussed with Consultants/Other Providers:

## 2017-06-06 NOTE — PROGRESS NOTE ADULT - ASSESSMENT
85 yo Male with h/o CLL c/b relapsed warm AIHA s/p chemotherapy with rituximab, cyclophosphamide, and dexamethasone (last tx ~1 month ago), HTN, HLD, hypothyroidism, BPH, bronchiectasis, RLL PNA c/b parapneumonic effusion s/p CT and MIST II tx protocol, and recent admission (5/1-5/9/17) for LLL PNA who is presenting with hypocalcemia due to vitamin D deficiency. Admission c/b respiratory distress following PRBC transfusion likely 2/2 fluid overload necessitating diuretic tx and intermittent BIPAP.

## 2017-06-07 DIAGNOSIS — D59.1 OTHER AUTOIMMUNE HEMOLYTIC ANEMIAS: ICD-10-CM

## 2017-06-07 LAB
BUN SERPL-MCNC: 28 MG/DL — HIGH (ref 7–23)
CALCIUM SERPL-MCNC: 8.7 MG/DL — SIGNIFICANT CHANGE UP (ref 8.4–10.5)
CHLORIDE SERPL-SCNC: 99 MMOL/L — SIGNIFICANT CHANGE UP (ref 98–107)
CO2 SERPL-SCNC: 25 MMOL/L — SIGNIFICANT CHANGE UP (ref 22–31)
CREAT SERPL-MCNC: 1.17 MG/DL — SIGNIFICANT CHANGE UP (ref 0.5–1.3)
GLUCOSE SERPL-MCNC: 104 MG/DL — HIGH (ref 70–99)
HAPTOGLOB SERPL-MCNC: 146 MG/DL — SIGNIFICANT CHANGE UP (ref 34–200)
HCT VFR BLD CALC: 21.9 % — LOW (ref 39–50)
HGB BLD-MCNC: 6.8 G/DL — CRITICAL LOW (ref 13–17)
LDH SERPL L TO P-CCNC: 314 U/L — HIGH (ref 135–225)
MAGNESIUM SERPL-MCNC: 1.6 MG/DL — SIGNIFICANT CHANGE UP (ref 1.6–2.6)
MANUAL SMEAR VERIFICATION: SIGNIFICANT CHANGE UP
MCHC RBC-ENTMCNC: 31.1 % — LOW (ref 32–36)
MCHC RBC-ENTMCNC: 31.8 PG — SIGNIFICANT CHANGE UP (ref 27–34)
MCV RBC AUTO: 102.3 FL — HIGH (ref 80–100)
PHOSPHATE SERPL-MCNC: 4.5 MG/DL — SIGNIFICANT CHANGE UP (ref 2.5–4.5)
PLATELET # BLD AUTO: 109 K/UL — LOW (ref 150–400)
PMV BLD: 9.2 FL — SIGNIFICANT CHANGE UP (ref 7–13)
POTASSIUM SERPL-MCNC: 4.4 MMOL/L — SIGNIFICANT CHANGE UP (ref 3.5–5.3)
POTASSIUM SERPL-SCNC: 4.4 MMOL/L — SIGNIFICANT CHANGE UP (ref 3.5–5.3)
RBC # BLD: 2.14 M/UL — LOW (ref 4.2–5.8)
RBC # FLD: 19.9 % — HIGH (ref 10.3–14.5)
RETICS #: 210.8 10X3/UL — HIGH (ref 17–73)
RETICS/RBC NFR: 9.9 % — HIGH (ref 0.5–2.5)
SODIUM SERPL-SCNC: 143 MMOL/L — SIGNIFICANT CHANGE UP (ref 135–145)
WBC # BLD: 60.64 K/UL — CRITICAL HIGH (ref 3.8–10.5)
WBC # FLD AUTO: 60.64 K/UL — CRITICAL HIGH (ref 3.8–10.5)

## 2017-06-07 PROCEDURE — 99233 SBSQ HOSP IP/OBS HIGH 50: CPT | Mod: GC

## 2017-06-07 RX ORDER — FUROSEMIDE 40 MG
40 TABLET ORAL
Qty: 0 | Refills: 0 | Status: DISCONTINUED | OUTPATIENT
Start: 2017-06-07 | End: 2017-06-07

## 2017-06-07 RX ORDER — POTASSIUM CHLORIDE 1.5 G/1.58G
20 POWDER, FOR SOLUTION ORAL DAILY
Qty: 30 | Refills: 0 | Status: DISCONTINUED | COMMUNITY
Start: 2017-05-15 | End: 2017-06-07

## 2017-06-07 RX ADMIN — ATORVASTATIN CALCIUM 20 MILLIGRAM(S): 80 TABLET, FILM COATED ORAL at 21:43

## 2017-06-07 RX ADMIN — PIPERACILLIN AND TAZOBACTAM 25 GRAM(S): 4; .5 INJECTION, POWDER, LYOPHILIZED, FOR SOLUTION INTRAVENOUS at 18:47

## 2017-06-07 RX ADMIN — Medication 3 MILLILITER(S): at 16:29

## 2017-06-07 RX ADMIN — Medication 1 TABLET(S): at 05:35

## 2017-06-07 RX ADMIN — Medication 1 TABLET(S): at 17:25

## 2017-06-07 RX ADMIN — HEPARIN SODIUM 5000 UNIT(S): 5000 INJECTION INTRAVENOUS; SUBCUTANEOUS at 17:25

## 2017-06-07 RX ADMIN — PIPERACILLIN AND TAZOBACTAM 25 GRAM(S): 4; .5 INJECTION, POWDER, LYOPHILIZED, FOR SOLUTION INTRAVENOUS at 01:45

## 2017-06-07 RX ADMIN — AMLODIPINE BESYLATE 10 MILLIGRAM(S): 2.5 TABLET ORAL at 05:35

## 2017-06-07 RX ADMIN — FINASTERIDE 5 MILLIGRAM(S): 5 TABLET, FILM COATED ORAL at 11:34

## 2017-06-07 RX ADMIN — HEPARIN SODIUM 5000 UNIT(S): 5000 INJECTION INTRAVENOUS; SUBCUTANEOUS at 05:34

## 2017-06-07 RX ADMIN — Medication 1 MILLIGRAM(S): at 11:34

## 2017-06-07 RX ADMIN — Medication 250 MILLIGRAM(S): at 12:52

## 2017-06-07 RX ADMIN — Medication 300 MILLIGRAM(S): at 11:34

## 2017-06-07 RX ADMIN — PIPERACILLIN AND TAZOBACTAM 25 GRAM(S): 4; .5 INJECTION, POWDER, LYOPHILIZED, FOR SOLUTION INTRAVENOUS at 09:41

## 2017-06-07 RX ADMIN — Medication 40 MILLIGRAM(S): at 17:25

## 2017-06-07 RX ADMIN — Medication 100 MICROGRAM(S): at 05:34

## 2017-06-07 RX ADMIN — Medication 3 MILLILITER(S): at 10:35

## 2017-06-07 RX ADMIN — Medication 3 MILLILITER(S): at 21:33

## 2017-06-07 RX ADMIN — Medication 3 MILLILITER(S): at 03:25

## 2017-06-07 NOTE — PROGRESS NOTE ADULT - PROBLEM SELECTOR PLAN 3
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   Hemoglobin 6.8 this AM following transfusion of PRBC yesterday- persistent anemia likely 2/2 continuous hemolysis   will transfuse 1/2 units x 2 with lasix   - trend cbc q 24 hrs   - Heme will see pt- f/u recs

## 2017-06-07 NOTE — PROGRESS NOTE ADULT - PROBLEM SELECTOR PLAN 1
Anemia refractory to pRBC transfusion yesterday  -may improve with steroids or further inpatient CLL treatment will discuss with Dr. Mathew who is primary hematologist Anemia refractory to pRBC transfusion yesterday  -discussed with patient's primary hematologist Dr. Mathew who recommends cyclosporine or cellcept for an alternative option for controlling AIHA that patient may tolerate better than chemotherapy as patient has not tolerated recent chemotherapy well and primary process of current anemia seems more likely AIHA than CLL infiltration

## 2017-06-07 NOTE — PROGRESS NOTE ADULT - SUBJECTIVE AND OBJECTIVE BOX
INTERVAL HPI/OVERNIGHT EVENTS:  Patient S&E at bedside. No o/n events, patient resting comfortably in chair. No complaints at this time, says his SOB is improved and now at baseline. Patient denies chest pain or excessive fatigue.    VITAL SIGNS:  T(F): 97.4  HR: 91  BP: 123/50  RR: 18  SpO2: 99%  Wt(kg): --    PHYSICAL EXAM:    Constitutional: NAD  Eyes: EOMI, sclera non-icteric  Neck: supple, no masses, no JVD  Respiratory: CTA b/l no R/W/R   Cardiovascular: RRR, no M/R/G  Gastrointestinal: soft, NTND  Extremities: no c/c/e  Neurological: AAOx3      MEDICATIONS  (STANDING):  heparin  Injectable 5000Unit(s) SubCutaneous every 12 hours  ALBUTerol/ipratropium for Nebulization 3milliLiter(s) Nebulizer every 6 hours  finasteride 5milliGRAM(s) Oral daily  atorvastatin 20milliGRAM(s) Oral at bedtime  levothyroxine 100MICROGram(s) Oral daily  folic acid 1milliGRAM(s) Oral daily  allopurinol 300milliGRAM(s) Oral daily  calcium carbonate 1250 mG + Vitamin D (OsCal 500 + D) 1Tablet(s) Oral two times a day  calcium gluconate IVPB 1Gram(s) IV Intermittent every 1 hour  ergocalciferol [id number](s) Oral every week  amLODIPine   Tablet 10milliGRAM(s) Oral daily  piperacillin/tazobactam IVPB. 3.375Gram(s) IV Intermittent every 8 hours  vancomycin  IVPB  IV Intermittent   vancomycin  IVPB 1000milliGRAM(s) IV Intermittent every 24 hours  furosemide   Injectable 40milliGRAM(s) IV Push two times a day    MEDICATIONS  (PRN):  guaiFENesin    Syrup 200milliGRAM(s) Oral every 6 hours PRN Cough      Allergies    No Known Drug Allergies  to some types of seafood (Unknown)    LABS:                        6.8    60.64 )-----------( 109      ( 07 Jun 2017 05:50 )             21.9     06-07    143  |  99  |  28<H>  ----------------------------<  104<H>  4.4   |  25  |  1.17    Ca    8.7      07 Jun 2017 05:50  Phos  4.5     06-07  Mg     1.6     06-07

## 2017-06-07 NOTE — PROGRESS NOTE ADULT - ASSESSMENT
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 Admission c/b respiratory distress following PRBC transfusion likely 2/2 fluid overload necessitating diuretic tx and intermittent BIPAP.

## 2017-06-07 NOTE — PROGRESS NOTE ADULT - ASSESSMENT
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

## 2017-06-07 NOTE — PROGRESS NOTE ADULT - SUBJECTIVE AND OBJECTIVE BOX
Date of Admission:    24H hour events:     MEDICATIONS:  heparin  Injectable 5000Unit(s) SubCutaneous every 12 hours  amLODIPine   Tablet 10milliGRAM(s) Oral daily  furosemide   Injectable 40milliGRAM(s) IV Push two times a day    piperacillin/tazobactam IVPB. 3.375Gram(s) IV Intermittent every 8 hours  vancomycin  IVPB  IV Intermittent   vancomycin  IVPB 1000milliGRAM(s) IV Intermittent every 24 hours    ALBUTerol/ipratropium for Nebulization 3milliLiter(s) Nebulizer every 6 hours  guaiFENesin    Syrup 200milliGRAM(s) Oral every 6 hours PRN        finasteride 5milliGRAM(s) Oral daily  atorvastatin 20milliGRAM(s) Oral at bedtime  levothyroxine 100MICROGram(s) Oral daily  allopurinol 300milliGRAM(s) Oral daily    folic acid 1milliGRAM(s) Oral daily  calcium carbonate 1250 mG + Vitamin D (OsCal 500 + D) 1Tablet(s) Oral two times a day  calcium gluconate IVPB 1Gram(s) IV Intermittent every 1 hour  ergocalciferol [id number](s) Oral every week      REVIEW OF SYSTEMS:  Complete 10point ROS negative.    PHYSICAL EXAM:  T(C): 36.3, Max: 37.5 (06-06 @ 22:01)  HR: 72 (71 - 87)  BP: 123/50 (116/48 - 137/53)  RR: 18 (18 - 18)  SpO2: 99% (94% - 100%)  Wt(kg): --  I&O's Summary    I & Os for current day (as of 07 Jun 2017 07:56)  =============================================  IN: 1750 ml / OUT: 1900 ml / NET: -150 ml      Appearance: Normal	  HEENT:   Normal oral mucosa, PERRL, EOMI	  Lymphatic: No lymphadenopathy  Cardiovascular: Normal S1 S2, No JVD, No murmurs, No edema  Respiratory: Lungs clear to auscultation	  Psychiatry: A & O x 3, Mood & affect appropriate  Gastrointestinal:  Soft, Non-tender, + BS	  Skin: No rashes, No ecchymoses, No cyanosis	  Neurologic: Non-focal  Extremities: Normal range of motion, No clubbing, cyanosis or edema  Vascular: Peripheral pulses palpable 2+ bilaterally        LABS:	 	    CBC Full  -  ( 07 Jun 2017 05:50 )  WBC Count : 60.64 K/uL  Hemoglobin : 6.8 g/dL  Hematocrit : 21.9 %  Platelet Count - Automated : 109 K/uL  Mean Cell Volume : 102.3 fL  Mean Cell Hemoglobin : 31.8 pg  Mean Cell Hemoglobin Concentration : 31.1 %  Auto Neutrophil # : x  Auto Lymphocyte # : x  Auto Monocyte # : x  Auto Eosinophil # : x  Auto Basophil # : x  Auto Neutrophil % : x  Auto Lymphocyte % : x  Auto Monocyte % : x  Auto Eosinophil % : x  Auto Basophil % : x    06-07    143  |  99  |  28<H>  ----------------------------<  104<H>  4.4   |  25  |  1.17  06-06    142  |  102  |  20  ----------------------------<  115<H>  4.2   |  25  |  1.03    Ca    8.7      07 Jun 2017 05:50  Ca    8.6      06 Jun 2017 06:10  Phos  4.5     06-07  Phos  3.5     06-06  Mg     1.6     06-07  Mg     1.6     06-06 Patient is a 86y old  Male who presents with a chief complaint of Abnormal outpatient labs (01 Jun 2017 14:45)    24H hour events: No acute events ON. No worsening SOB following PRBC transufsion throughout day yesterday but having productive cough this AM. Afebrile, denies chest pain, diarrhea,     MEDICATIONS:  heparin  Injectable 5000Unit(s) SubCutaneous every 12 hours  amLODIPine   Tablet 10milliGRAM(s) Oral daily  furosemide   Injectable 40milliGRAM(s) IV Push two times a day    piperacillin/tazobactam IVPB. 3.375Gram(s) IV Intermittent every 8 hours  vancomycin  IVPB  IV Intermittent   vancomycin  IVPB 1000milliGRAM(s) IV Intermittent every 24 hours    ALBUTerol/ipratropium for Nebulization 3milliLiter(s) Nebulizer every 6 hours  guaiFENesin    Syrup 200milliGRAM(s) Oral every 6 hours PRN        finasteride 5milliGRAM(s) Oral daily  atorvastatin 20milliGRAM(s) Oral at bedtime  levothyroxine 100MICROGram(s) Oral daily  allopurinol 300milliGRAM(s) Oral daily    folic acid 1milliGRAM(s) Oral daily  calcium carbonate 1250 mG + Vitamin D (OsCal 500 + D) 1Tablet(s) Oral two times a day  calcium gluconate IVPB 1Gram(s) IV Intermittent every 1 hour  ergocalciferol [id number](s) Oral every week      REVIEW OF SYSTEMS:  Complete 10point ROS negative.    PHYSICAL EXAM:  T(C): 36.3, Max: 37.5 (06-06 @ 22:01)  HR: 72 (71 - 87)  BP: 123/50 (116/48 - 137/53)  RR: 18 (18 - 18)  SpO2: 99% (94% - 100%)  Wt(kg): --  I&O's Summary    I & Os for current day (as of 07 Jun 2017 07:56)  =============================================  IN: 1750 ml / OUT: 1900 ml / NET: -150 ml      Appearance: Normal	  HEENT:   Normal oral mucosa, PERRL, EOMI	  Lymphatic: No lymphadenopathy  Cardiovascular: Normal S1 S2, No JVD, No murmurs, No edema  Respiratory: Lungs clear to auscultation	  Psychiatry: A & O x 3, Mood & affect appropriate  Gastrointestinal:  Soft, Non-tender, + BS	  Skin: No rashes, No ecchymoses, No cyanosis	  Neurologic: Non-focal  Extremities: Normal range of motion, No clubbing, cyanosis or edema  Vascular: Peripheral pulses palpable 2+ bilaterally        LABS:	 	    CBC Full  -  ( 07 Jun 2017 05:50 )  WBC Count : 60.64 K/uL  Hemoglobin : 6.8 g/dL  Hematocrit : 21.9 %  Platelet Count - Automated : 109 K/uL  Mean Cell Volume : 102.3 fL  Mean Cell Hemoglobin : 31.8 pg  Mean Cell Hemoglobin Concentration : 31.1 %  Auto Neutrophil # : x  Auto Lymphocyte # : x  Auto Monocyte # : x  Auto Eosinophil # : x  Auto Basophil # : x  Auto Neutrophil % : x  Auto Lymphocyte % : x  Auto Monocyte % : x  Auto Eosinophil % : x  Auto Basophil % : x    06-07    143  |  99  |  28<H>  ----------------------------<  104<H>  4.4   |  25  |  1.17  06-06    142  |  102  |  20  ----------------------------<  115<H>  4.2   |  25  |  1.03    Ca    8.7      07 Jun 2017 05:50  Ca    8.6      06 Jun 2017 06:10  Phos  4.5     06-07  Phos  3.5     06-06  Mg     1.6     06-07  Mg     1.6     06-06 Patient is a 86y old  Male who presents with a chief complaint of Abnormal outpatient labs (01 Jun 2017 14:45)    24H hour events: No acute events ON. No worsening SOB following PRBC transufsion throughout day yesterday but having productive cough this AM. Afebrile, denies chest pain, diarrhea,     MEDICATIONS:  heparin  Injectable 5000Unit(s) SubCutaneous every 12 hours  amLODIPine   Tablet 10milliGRAM(s) Oral daily  furosemide   Injectable 40milliGRAM(s) IV Push two times a day    piperacillin/tazobactam IVPB. 3.375Gram(s) IV Intermittent every 8 hours  vancomycin  IVPB  IV Intermittent   vancomycin  IVPB 1000milliGRAM(s) IV Intermittent every 24 hours    ALBUTerol/ipratropium for Nebulization 3milliLiter(s) Nebulizer every 6 hours  guaiFENesin    Syrup 200milliGRAM(s) Oral every 6 hours PRN        finasteride 5milliGRAM(s) Oral daily  atorvastatin 20milliGRAM(s) Oral at bedtime  levothyroxine 100MICROGram(s) Oral daily  allopurinol 300milliGRAM(s) Oral daily    folic acid 1milliGRAM(s) Oral daily  calcium carbonate 1250 mG + Vitamin D (OsCal 500 + D) 1Tablet(s) Oral two times a day  calcium gluconate IVPB 1Gram(s) IV Intermittent every 1 hour  ergocalciferol [id number](s) Oral every week      REVIEW OF SYSTEMS:  Complete 10point ROS negative.    PHYSICAL EXAM:  T(C): 36.3, Max: 37.5 (06-06 @ 22:01)  HR: 72 (71 - 87)  BP: 123/50 (116/48 - 137/53)  RR: 18 (18 - 18)  SpO2: 99% (94% - 100%)  Wt(kg): --  I&O's Summary    I & Os for current day (as of 07 Jun 2017 07:56)  =============================================  IN: 1750 ml / OUT: 1900 ml / NET: -150 ml      Appearance: Normal	  HEENT:   Normal oral mucosa, PERRL, EOMI	  Lymphatic: No lymphadenopathy  Cardiovascular: Normal S1 S2, No JVD, No murmurs, No edema  Respiratory: Lungs clear to auscultation	  Psychiatry: A & O x 3, Mood & affect appropriate  Gastrointestinal:  Soft, Non-tender, + BS	  Skin: No rashes, No ecchymoses, No cyanosis	  Neurologic: Non-focal  Extremities: Normal range of motion, No clubbing, cyanosis or edema  Vascular: Peripheral pulses palpable 2+ bilaterally        LABS:	 	    CBC Full  -  ( 07 Jun 2017 05:50 )  WBC Count : 60.64 K/uL  Hemoglobin : 6.8 g/dL  Hematocrit : 21.9 %  Platelet Count - Automated : 109 K/uL      143  |  99  |  28<H>  ----------------------------<  104<H>  4.4   |  25  |  1.17  06-06    142  |  102  |  20  ----------------------------<  115<H>  4.2   |  25  |  1.03    Ca    8.7      07 Jun 2017 05:50  Ca    8.6      06 Jun 2017 06:10  Phos  4.5     06-07  Phos  3.5     06-06  Mg     1.6     06-07  Mg     1.6     06-06 Patient is a 86y old  Male who presents with a chief complaint of Abnormal outpatient labs (01 Jun 2017 14:45)    24H hour events: No acute events ON. No worsening SOB following PRBC transufsion throughout day yesterday but having productive cough this AM. Afebrile, denies chest pain, diarrhea,     MEDICATIONS:  heparin  Injectable 5000Unit(s) SubCutaneous every 12 hours  amLODIPine   Tablet 10milliGRAM(s) Oral daily  furosemide   Injectable 40milliGRAM(s) IV Push two times a day    piperacillin/tazobactam IVPB. 3.375Gram(s) IV Intermittent every 8 hours  vancomycin  IVPB  IV Intermittent   vancomycin  IVPB 1000milliGRAM(s) IV Intermittent every 24 hours    ALBUTerol/ipratropium for Nebulization 3milliLiter(s) Nebulizer every 6 hours  guaiFENesin    Syrup 200milliGRAM(s) Oral every 6 hours PRN        finasteride 5milliGRAM(s) Oral daily  atorvastatin 20milliGRAM(s) Oral at bedtime  levothyroxine 100MICROGram(s) Oral daily  allopurinol 300milliGRAM(s) Oral daily    folic acid 1milliGRAM(s) Oral daily  calcium carbonate 1250 mG + Vitamin D (OsCal 500 + D) 1Tablet(s) Oral two times a day  calcium gluconate IVPB 1Gram(s) IV Intermittent every 1 hour  ergocalciferol [id number](s) Oral every week      REVIEW OF SYSTEMS:  Complete 10point ROS negative.    PHYSICAL EXAM:  T(C): 36.3, Max: 37.5 (06-06 @ 22:01)  HR: 72 (71 - 87)  BP: 123/50 (116/48 - 137/53)  RR: 18 (18 - 18)  SpO2: 99% (94% - 100%)  Wt(kg): --  I&O's Summary    I & Os for current day (as of 07 Jun 2017 07:56)  =============================================  IN: 1750 ml / OUT: 1900 ml / NET: -150 ml      Appearance: Normal	  HEENT:   Normal oral mucosa, PERRL, EOMI	  Lymphatic: No lymphadenopathy  Cardiovascular: Normal S1 S2, No JVD, No murmurs, No edema  Respiratory: course BS in Right lung base, diminished BS in bases.   Psychiatry: A & O x 3, Mood & affect appropriate  Gastrointestinal:  Soft, Non-tender, + BS	  Skin: No rashes, No ecchymoses, No cyanosis	  Neurologic: Non-focal  Extremities: Normal range of motion, No clubbing, cyanosis or edema  Vascular: Peripheral pulses palpable 2+ bilaterally        LABS:	 	    CBC Full  -  ( 07 Jun 2017 05:50 )  WBC Count : 60.64 K/uL  Hemoglobin : 6.8 g/dL  Hematocrit : 21.9 %  Platelet Count - Automated : 109 K/uL      143  |  99  |  28<H>  ----------------------------<  104<H>  4.4   |  25  |  1.17  06-06    142  |  102  |  20  ----------------------------<  115<H>  4.2   |  25  |  1.03    Ca    8.7      07 Jun 2017 05:50  Ca    8.6      06 Jun 2017 06:10  Phos  4.5     06-07  Phos  3.5     06-06  Mg     1.6     06-07  Mg     1.6     06-06

## 2017-06-07 NOTE — PROGRESS NOTE ADULT - PROBLEM SELECTOR PLAN 1
Resolved. Was likely in the setting of flash pulmonary edema 2/2 blood transfusion and resolved with diuresis and bipap.  - continue to monitor respiratory status  - c/w antibiotics per primary team

## 2017-06-07 NOTE — PROGRESS NOTE ADULT - SUBJECTIVE AND OBJECTIVE BOX
CHIEF COMPLAINT: Shortness of Breath    Interval Events: Patient continues to feel some dyspnea at rest and coughing which he says is at baseline for what he experiences at home. Patient feels better than earlier in hospitalization. No new symptoms and patient without any current complaints.    REVIEW OF SYSTEMS:  Constitutional: No fevers, no chills  ENT: No rhinorrhea, no sore throat  CV: No chest pain, no palpitations, no leg swelling  Resp: Dypsnea and cough as above, no wheezing, minimal sputum production  GI: No abdominal pain, no nausea, no vomiting  : No dysuria, no hematuria  [X] All other systems negative  [ ] Unable to assess ROS because ________    OBJECTIVE:  ICU Vital Signs Last 24 Hrs  T(C): 36.3, Max: 37.5 (06-06 @ 22:01)  T(F): 97.4, Max: 99.5 (06-06 @ 22:01)  HR: 91 (72 - 94)  BP: 123/50 (116/48 - 137/53)  BP(mean): --  ABP: --  ABP(mean): --  RR: 18 (18 - 18)  SpO2: 99% (94% - 100%)        I & Os for current day (as of 06-07 @ 10:41)  =============================================  IN: 1750 ml / OUT: 1900 ml / NET: -150 ml    CAPILLARY BLOOD GLUCOSE      PHYSICAL EXAM:  General: NAD, Sitting up in chair comfortably  HEENT: MMM, no JVD  Lymph Nodes: No cervical or occipital LAD  Respiratory: CTAB, no w/r/r  Cardiovascular: RRR  Abdomen: Soft, NT/ND  Extremities: No LE pitting edema b/l  Skin: No rashes  Neurological: A&O x 2-3, non-focal    HOSPITAL MEDICATIONS:  MEDICATIONS  (STANDING):  heparin  Injectable 5000Unit(s) SubCutaneous every 12 hours  ALBUTerol/ipratropium for Nebulization 3milliLiter(s) Nebulizer every 6 hours  finasteride 5milliGRAM(s) Oral daily  atorvastatin 20milliGRAM(s) Oral at bedtime  levothyroxine 100MICROGram(s) Oral daily  folic acid 1milliGRAM(s) Oral daily  allopurinol 300milliGRAM(s) Oral daily  calcium carbonate 1250 mG + Vitamin D (OsCal 500 + D) 1Tablet(s) Oral two times a day  calcium gluconate IVPB 1Gram(s) IV Intermittent every 1 hour  ergocalciferol 82956Hlch(s) Oral every week  amLODIPine   Tablet 10milliGRAM(s) Oral daily  piperacillin/tazobactam IVPB. 3.375Gram(s) IV Intermittent every 8 hours  vancomycin  IVPB  IV Intermittent   vancomycin  IVPB 1000milliGRAM(s) IV Intermittent every 24 hours  furosemide   Injectable 40milliGRAM(s) IV Push two times a day    MEDICATIONS  (PRN):  guaiFENesin    Syrup 200milliGRAM(s) Oral every 6 hours PRN Cough      LABS:                        6.8    60.64 )-----------( 109      ( 07 Jun 2017 05:50 )             21.9     06-07    143  |  99  |  28<H>  ----------------------------<  104<H>  4.4   |  25  |  1.17    Ca    8.7      07 Jun 2017 05:50  Phos  4.5     06-07  Mg     1.6     06-07                MICROBIOLOGY:     RADIOLOGY:  [ ] Reviewed and interpreted by me    PULMONARY FUNCTION TESTS:    EKG:

## 2017-06-07 NOTE — PROGRESS NOTE ADULT - PROBLEM SELECTOR PLAN 2
Patient's respiratory status is improved - comfortable on nasal cannula  - likely in the setting of volume overload from blood transfusion  - will c/w empiric antibiotics with Vanc and Zosyn (Day 5/5) for possible pna component given elevated procal on 6/2- Will likely proceed with 5 d course   - will wean nasal cannula as tolerated  -F/u further pulm recs Patient's respiratory status is improved - comfortable on nasal cannula  -Monitor for further resp distress in setting of necessary PRBC transfusion today- Will give ppx Lasix 40 IV prior to each 0.5 unit   - will c/w empiric antibiotics with Vanc and Zosyn (Day 5/5) for possible pna component given elevated procal on 6/2  - will wean nasal cannula as tolerated  -F/u further pulm recs

## 2017-06-07 NOTE — PROGRESS NOTE ADULT - PROBLEM SELECTOR PLAN 2
Patient may benefit from further therapy given anemia, however the patient is high risk for another infection with chemotherapy Will continue to monitor CBC with diff and follow up outpatient for further management/surveillance options

## 2017-06-07 NOTE — PROGRESS NOTE ADULT - ASSESSMENT
87 yo M CLL with AIHA last RCd a month ago, recurrent pneumonia with recent hospitalization presents with symptomatic hypocalcemia and hospital course c/b acute on chronic respiratory failure secondary to pulmonary edema after blood transfusion

## 2017-06-08 DIAGNOSIS — N17.9 ACUTE KIDNEY FAILURE, UNSPECIFIED: ICD-10-CM

## 2017-06-08 LAB
BASOPHILS NFR SPEC: 1 % — SIGNIFICANT CHANGE UP (ref 0–2)
BUN SERPL-MCNC: 33 MG/DL — HIGH (ref 7–23)
CALCIUM SERPL-MCNC: 9 MG/DL — SIGNIFICANT CHANGE UP (ref 8.4–10.5)
CHLORIDE SERPL-SCNC: 97 MMOL/L — LOW (ref 98–107)
CO2 SERPL-SCNC: 27 MMOL/L — SIGNIFICANT CHANGE UP (ref 22–31)
CREAT SERPL-MCNC: 1.36 MG/DL — HIGH (ref 0.5–1.3)
EOSINOPHIL NFR FLD: 1 % — SIGNIFICANT CHANGE UP (ref 0–6)
GLUCOSE SERPL-MCNC: 115 MG/DL — HIGH (ref 70–99)
HCT VFR BLD CALC: 21.3 % — LOW (ref 39–50)
HGB BLD-MCNC: 6.6 G/DL — CRITICAL LOW (ref 13–17)
HYPOCHROMIA BLD QL: SLIGHT — SIGNIFICANT CHANGE UP
LYMPHOCYTES NFR SPEC AUTO: 76 % — HIGH (ref 13–44)
MAGNESIUM SERPL-MCNC: 1.5 MG/DL — LOW (ref 1.6–2.6)
MANUAL SMEAR VERIFICATION: SIGNIFICANT CHANGE UP
MCHC RBC-ENTMCNC: 29.9 PG — SIGNIFICANT CHANGE UP (ref 27–34)
MCHC RBC-ENTMCNC: 31 % — LOW (ref 32–36)
MCV RBC AUTO: 96.4 FL — SIGNIFICANT CHANGE UP (ref 80–100)
MICROCYTES BLD QL: SLIGHT — SIGNIFICANT CHANGE UP
MONOCYTES NFR BLD: 0 % — LOW (ref 2–9)
NEUTROPHIL AB SER-ACNC: 22 % — LOW (ref 43–77)
PHOSPHATE SERPL-MCNC: 3.6 MG/DL — SIGNIFICANT CHANGE UP (ref 2.5–4.5)
PLATELET # BLD AUTO: 108 K/UL — LOW (ref 150–400)
PMV BLD: 9.1 FL — SIGNIFICANT CHANGE UP (ref 7–13)
POLYCHROMASIA BLD QL SMEAR: SLIGHT — SIGNIFICANT CHANGE UP
POTASSIUM SERPL-MCNC: 4.4 MMOL/L — SIGNIFICANT CHANGE UP (ref 3.5–5.3)
POTASSIUM SERPL-SCNC: 4.4 MMOL/L — SIGNIFICANT CHANGE UP (ref 3.5–5.3)
RBC # BLD: 2.21 M/UL — LOW (ref 4.2–5.8)
RBC # FLD: 24.1 % — HIGH (ref 10.3–14.5)
SODIUM SERPL-SCNC: 139 MMOL/L — SIGNIFICANT CHANGE UP (ref 135–145)
WBC # BLD: 70.15 K/UL — CRITICAL HIGH (ref 3.8–10.5)
WBC # FLD AUTO: 70.15 K/UL — CRITICAL HIGH (ref 3.8–10.5)

## 2017-06-08 PROCEDURE — 99233 SBSQ HOSP IP/OBS HIGH 50: CPT | Mod: GC

## 2017-06-08 RX ORDER — MYCOPHENOLATE MOFETIL 250 MG/1
250 CAPSULE ORAL
Qty: 0 | Refills: 0 | Status: DISCONTINUED | OUTPATIENT
Start: 2017-06-08 | End: 2017-06-12

## 2017-06-08 RX ORDER — MAGNESIUM SULFATE 500 MG/ML
1 VIAL (ML) INJECTION ONCE
Qty: 0 | Refills: 0 | Status: COMPLETED | OUTPATIENT
Start: 2017-06-08 | End: 2017-06-08

## 2017-06-08 RX ORDER — CYCLOSPORINE 100 MG/1
150 CAPSULE ORAL
Qty: 0 | Refills: 0 | Status: DISCONTINUED | OUTPATIENT
Start: 2017-06-08 | End: 2017-06-08

## 2017-06-08 RX ADMIN — Medication 1 TABLET(S): at 05:25

## 2017-06-08 RX ADMIN — Medication 300 MILLIGRAM(S): at 11:26

## 2017-06-08 RX ADMIN — Medication 1 MILLIGRAM(S): at 11:26

## 2017-06-08 RX ADMIN — AMLODIPINE BESYLATE 10 MILLIGRAM(S): 2.5 TABLET ORAL at 05:25

## 2017-06-08 RX ADMIN — HEPARIN SODIUM 5000 UNIT(S): 5000 INJECTION INTRAVENOUS; SUBCUTANEOUS at 17:35

## 2017-06-08 RX ADMIN — PIPERACILLIN AND TAZOBACTAM 25 GRAM(S): 4; .5 INJECTION, POWDER, LYOPHILIZED, FOR SOLUTION INTRAVENOUS at 01:43

## 2017-06-08 RX ADMIN — Medication 200 MILLIGRAM(S): at 22:04

## 2017-06-08 RX ADMIN — Medication 1 TABLET(S): at 17:35

## 2017-06-08 RX ADMIN — HEPARIN SODIUM 5000 UNIT(S): 5000 INJECTION INTRAVENOUS; SUBCUTANEOUS at 05:25

## 2017-06-08 RX ADMIN — Medication 3 MILLILITER(S): at 04:26

## 2017-06-08 RX ADMIN — Medication 100 GRAM(S): at 08:57

## 2017-06-08 RX ADMIN — FINASTERIDE 5 MILLIGRAM(S): 5 TABLET, FILM COATED ORAL at 11:26

## 2017-06-08 RX ADMIN — Medication 3 MILLILITER(S): at 10:30

## 2017-06-08 RX ADMIN — MYCOPHENOLATE MOFETIL 250 MILLIGRAM(S): 250 CAPSULE ORAL at 23:47

## 2017-06-08 RX ADMIN — Medication 100 MICROGRAM(S): at 05:25

## 2017-06-08 RX ADMIN — ATORVASTATIN CALCIUM 20 MILLIGRAM(S): 80 TABLET, FILM COATED ORAL at 22:04

## 2017-06-08 RX ADMIN — Medication 3 MILLILITER(S): at 16:45

## 2017-06-08 RX ADMIN — MYCOPHENOLATE MOFETIL 250 MILLIGRAM(S): 250 CAPSULE ORAL at 14:39

## 2017-06-08 RX ADMIN — Medication 3 MILLILITER(S): at 22:10

## 2017-06-08 RX ADMIN — PIPERACILLIN AND TAZOBACTAM 25 GRAM(S): 4; .5 INJECTION, POWDER, LYOPHILIZED, FOR SOLUTION INTRAVENOUS at 09:59

## 2017-06-08 NOTE — PROGRESS NOTE ADULT - PROBLEM SELECTOR PLAN 3
Mild bump in Cr to 1.36  Will monitor and f/u heme plans prior to starting hydration due to pulm status

## 2017-06-08 NOTE — PROGRESS NOTE ADULT - PROBLEM SELECTOR PLAN 2
-Likely in setting of low vitamin D  -Oscal started, weekly 50,000 IU Vit D supplementation initiated- Calcium improved   - will replete w/ IV calcium as needed

## 2017-06-08 NOTE — PROGRESS NOTE ADULT - PROBLEM SELECTOR PLAN 1
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   Hb persistently low- 6.6 this AM- despite 2 u PRBC transfusions in past 2 days  Per Heme persistent anemia likely due to AIHA- will initiate cellcept vs cyclosporine today

## 2017-06-08 NOTE — PROGRESS NOTE ADULT - PROBLEM SELECTOR PLAN 5
Patient's respiratory status is improved - comfortable on nasal cannula  S/P 5d course of vanc/zosyn HAP

## 2017-06-08 NOTE — PROGRESS NOTE ADULT - SUBJECTIVE AND OBJECTIVE BOX
INTERVAL HPI/OVERNIGHT EVENTS:  Patient S&E at bedside. No o/n events. Patient states he feels asymptomatic other than fatigue, denies chest pain. Shortness of breath at baseline.    VITAL SIGNS:  T(F): 98.1  HR: 79  BP: 118/53  RR: 18  SpO2: 97%  Wt(kg): --    PHYSICAL EXAM:    Constitutional: NAD  Eyes: EOMI, sclera non-icteric  Neck: supple  Respiratory: CTA b/l, poor effort  Cardiovascular: RRR, no M/R/G  Gastrointestinal: soft, NTND, no masses palpable, + BS  Extremities: no c/c/e  Neurological: AAOx3      MEDICATIONS  (STANDING):  heparin  Injectable 5000Unit(s) SubCutaneous every 12 hours  ALBUTerol/ipratropium for Nebulization 3milliLiter(s) Nebulizer every 6 hours  finasteride 5milliGRAM(s) Oral daily  atorvastatin 20milliGRAM(s) Oral at bedtime  levothyroxine 100MICROGram(s) Oral daily  folic acid 1milliGRAM(s) Oral daily  allopurinol 300milliGRAM(s) Oral daily  calcium carbonate 1250 mG + Vitamin D (OsCal 500 + D) 1Tablet(s) Oral two times a day  calcium gluconate IVPB 1Gram(s) IV Intermittent every 1 hour  ergocalciferol 96835Mzck(s) Oral every week  amLODIPine   Tablet 10milliGRAM(s) Oral daily  mycophenolate mofetil 250milliGRAM(s) Oral two times a day    MEDICATIONS  (PRN):  guaiFENesin    Syrup 200milliGRAM(s) Oral every 6 hours PRN Cough      Allergies    No Known Drug Allergies  to some types of seafood (Unknown)    Intolerances        LABS:                        6.6    70.15 )-----------( 108      ( 08 Jun 2017 05:40 )             21.3     06-08    139  |  97<L>  |  33<H>  ----------------------------<  115<H>  4.4   |  27  |  1.36<H>    Ca    9.0      08 Jun 2017 05:40  Phos  3.6     06-08  Mg     1.5     06-08

## 2017-06-08 NOTE — PROGRESS NOTE ADULT - SUBJECTIVE AND OBJECTIVE BOX
Patient is a 86y old  Male who presents with a chief complaint of Abnormal outpatient labs (01 Jun 2017 14:45)    24H hour events: No acute events ON. No SOB, chest pain, fevers, abd pain, n/v, diarrhea    MEDICATIONS:  heparin  Injectable 5000Unit(s) SubCutaneous every 12 hours  amLODIPine   Tablet 10milliGRAM(s) Oral daily    piperacillin/tazobactam IVPB. 3.375Gram(s) IV Intermittent every 8 hours  vancomycin  IVPB  IV Intermittent   vancomycin  IVPB 1000milliGRAM(s) IV Intermittent every 24 hours    ALBUTerol/ipratropium for Nebulization 3milliLiter(s) Nebulizer every 6 hours  guaiFENesin    Syrup 200milliGRAM(s) Oral every 6 hours PRN        finasteride 5milliGRAM(s) Oral daily  atorvastatin 20milliGRAM(s) Oral at bedtime  levothyroxine 100MICROGram(s) Oral daily  allopurinol 300milliGRAM(s) Oral daily    folic acid 1milliGRAM(s) Oral daily  calcium carbonate 1250 mG + Vitamin D (OsCal 500 + D) 1Tablet(s) Oral two times a day  calcium gluconate IVPB 1Gram(s) IV Intermittent every 1 hour  ergocalciferol 46666Lxmp(s) Oral every week      REVIEW OF SYSTEMS:  Complete 10point ROS negative.    PHYSICAL EXAM:  T(C): 36.7, Max: 36.7 (06-07 @ 14:15)  HR: 79 (70 - 94)  BP: 118/53 (118/53 - 121/50)  RR: 18 (18 - 18)  SpO2: 98% (96% - 100%)  Wt(kg): --  I&O's Summary    I & Os for current day (as of 08 Jun 2017 08:06)  =============================================  IN: 400 ml / OUT: 800 ml / NET: -400 ml      Appearance: Normal	  HEENT:   Normal oral mucosa, PERRL, EOMI	  Lymphatic: No lymphadenopathy  Cardiovascular: Normal S1 S2, No JVD, No murmurs, No edema  Respiratory: Lungs clear to auscultation	  Psychiatry: A & O x 3, Mood & affect appropriate  Gastrointestinal:  Soft, Non-tender, + BS	  Skin: No rashes, No ecchymoses, No cyanosis	  Neurologic: Non-focal  Extremities: Normal range of motion, No clubbing, cyanosis or edema  Vascular: Peripheral pulses palpable 2+ bilaterally        LABS:	 	    CBC Full  -  ( 08 Jun 2017 05:40 )  WBC Count : 70.15 K/uL  Hemoglobin : 6.6 g/dL  Hematocrit : 21.3 %  Platelet Count - Automated : 108 K/uL      06-08    139  |  97<L>  |  33<H>  ----------------------------<  115<H>  4.4   |  27  |  1.36<H>    Ca    9.0      08 Jun 2017 05:40  Ca    8.7      07 Jun 2017 05:50  Phos  3.6     06-08  Phos  4.5     06-07  Mg     1.5     06-08  Mg     1.6     06-07

## 2017-06-08 NOTE — PROGRESS NOTE ADULT - PROBLEM SELECTOR PLAN 4
S/p RCD ~1.5 month ago. Follows with Dr. Lux Mathew as outpatient.  Monitor CBC and transfuse as needed

## 2017-06-08 NOTE — PROGRESS NOTE ADULT - PROBLEM SELECTOR PLAN 1
CLL with AIHA  -AIHA is acute issue for patient, previously treated with chemotherapy with RCd however patient has not tolerated these treatments well with multiple infectious episodes requiring hospitalization  -discussed with Dr. Mathew, immunosuppresion with cyclosporine or mycophenolate is an alternative that could be better tolerated better, as patient has KARRI will start with Mycophenolate 500mg daily given in two divided doses of 250mg q 12 hours  -given patient's functional decline discussed with Dr. Mathew and palliative/hospice evaluation is appropriate at this time  -may continue careful pRBC transfusions as needed and check CBC daily

## 2017-06-08 NOTE — PROGRESS NOTE ADULT - ASSESSMENT
87 yo M CLL w/ AIHA previously on RCd as recently as late April 2017 complicated by frequent infections

## 2017-06-09 DIAGNOSIS — D64.9 ANEMIA, UNSPECIFIED: ICD-10-CM

## 2017-06-09 DIAGNOSIS — R53.81 OTHER MALAISE: ICD-10-CM

## 2017-06-09 DIAGNOSIS — Z71.89 OTHER SPECIFIED COUNSELING: ICD-10-CM

## 2017-06-09 LAB
BLD GP AB SCN SERPL QL: POSITIVE — SIGNIFICANT CHANGE UP
BUN SERPL-MCNC: 30 MG/DL — HIGH (ref 7–23)
CALCIUM SERPL-MCNC: 8.8 MG/DL — SIGNIFICANT CHANGE UP (ref 8.4–10.5)
CHLORIDE SERPL-SCNC: 97 MMOL/L — LOW (ref 98–107)
CO2 SERPL-SCNC: 27 MMOL/L — SIGNIFICANT CHANGE UP (ref 22–31)
CREAT SERPL-MCNC: 1.02 MG/DL — SIGNIFICANT CHANGE UP (ref 0.5–1.3)
DAT C3-SP REAG RBC QL: POSITIVE — SIGNIFICANT CHANGE UP
DAT POLY-SP REAG RBC QL: POSITIVE — SIGNIFICANT CHANGE UP
DIRECT COOMBS IGG: POSITIVE — SIGNIFICANT CHANGE UP
GLUCOSE SERPL-MCNC: 116 MG/DL — HIGH (ref 70–99)
HCT VFR BLD CALC: 20.8 % — CRITICAL LOW (ref 39–50)
HGB BLD-MCNC: 6 G/DL — CRITICAL LOW (ref 13–17)
MAGNESIUM SERPL-MCNC: 1.9 MG/DL — SIGNIFICANT CHANGE UP (ref 1.6–2.6)
MANUAL SMEAR VERIFICATION: SIGNIFICANT CHANGE UP
MCHC RBC-ENTMCNC: 28.8 % — LOW (ref 32–36)
MCHC RBC-ENTMCNC: 29 PG — SIGNIFICANT CHANGE UP (ref 27–34)
MCV RBC AUTO: 100.5 FL — HIGH (ref 80–100)
PHOSPHATE SERPL-MCNC: 3.1 MG/DL — SIGNIFICANT CHANGE UP (ref 2.5–4.5)
PLATELET # BLD AUTO: 130 K/UL — LOW (ref 150–400)
PMV BLD: 8.8 FL — SIGNIFICANT CHANGE UP (ref 7–13)
POTASSIUM SERPL-MCNC: 5 MMOL/L — SIGNIFICANT CHANGE UP (ref 3.5–5.3)
POTASSIUM SERPL-SCNC: 5 MMOL/L — SIGNIFICANT CHANGE UP (ref 3.5–5.3)
RBC # BLD: 2.07 M/UL — LOW (ref 4.2–5.8)
RBC # FLD: 25.7 % — HIGH (ref 10.3–14.5)
RH IG SCN BLD-IMP: POSITIVE — SIGNIFICANT CHANGE UP
SODIUM SERPL-SCNC: 139 MMOL/L — SIGNIFICANT CHANGE UP (ref 135–145)
WBC # BLD: 87.05 K/UL — CRITICAL HIGH (ref 3.8–10.5)
WBC # FLD AUTO: 87.05 K/UL — CRITICAL HIGH (ref 3.8–10.5)

## 2017-06-09 PROCEDURE — 99497 ADVNCD CARE PLAN 30 MIN: CPT | Mod: 25

## 2017-06-09 PROCEDURE — 99233 SBSQ HOSP IP/OBS HIGH 50: CPT | Mod: GC

## 2017-06-09 PROCEDURE — 99223 1ST HOSP IP/OBS HIGH 75: CPT

## 2017-06-09 PROCEDURE — 86079 PHYS BLOOD BANK SERV AUTHRJ: CPT

## 2017-06-09 PROCEDURE — 99233 SBSQ HOSP IP/OBS HIGH 50: CPT

## 2017-06-09 RX ORDER — FUROSEMIDE 40 MG
40 TABLET ORAL
Qty: 0 | Refills: 0 | Status: DISCONTINUED | OUTPATIENT
Start: 2017-06-09 | End: 2017-06-09

## 2017-06-09 RX ORDER — FUROSEMIDE 40 MG
40 TABLET ORAL DAILY
Qty: 0 | Refills: 0 | Status: DISCONTINUED | OUTPATIENT
Start: 2017-06-09 | End: 2017-06-09

## 2017-06-09 RX ORDER — FUROSEMIDE 40 MG
40 TABLET ORAL ONCE
Qty: 0 | Refills: 0 | Status: COMPLETED | OUTPATIENT
Start: 2017-06-09 | End: 2017-06-09

## 2017-06-09 RX ADMIN — HEPARIN SODIUM 5000 UNIT(S): 5000 INJECTION INTRAVENOUS; SUBCUTANEOUS at 17:08

## 2017-06-09 RX ADMIN — ATORVASTATIN CALCIUM 20 MILLIGRAM(S): 80 TABLET, FILM COATED ORAL at 22:50

## 2017-06-09 RX ADMIN — MYCOPHENOLATE MOFETIL 250 MILLIGRAM(S): 250 CAPSULE ORAL at 17:08

## 2017-06-09 RX ADMIN — FINASTERIDE 5 MILLIGRAM(S): 5 TABLET, FILM COATED ORAL at 11:58

## 2017-06-09 RX ADMIN — Medication 300 MILLIGRAM(S): at 11:58

## 2017-06-09 RX ADMIN — Medication 3 MILLILITER(S): at 22:40

## 2017-06-09 RX ADMIN — Medication 100 MICROGRAM(S): at 06:16

## 2017-06-09 RX ADMIN — Medication 3 MILLILITER(S): at 11:10

## 2017-06-09 RX ADMIN — Medication 3 MILLILITER(S): at 04:25

## 2017-06-09 RX ADMIN — AMLODIPINE BESYLATE 10 MILLIGRAM(S): 2.5 TABLET ORAL at 06:16

## 2017-06-09 RX ADMIN — Medication 1 TABLET(S): at 06:17

## 2017-06-09 RX ADMIN — ERGOCALCIFEROL 50000 UNIT(S): 1.25 CAPSULE ORAL at 11:58

## 2017-06-09 RX ADMIN — MYCOPHENOLATE MOFETIL 250 MILLIGRAM(S): 250 CAPSULE ORAL at 06:16

## 2017-06-09 RX ADMIN — Medication 1 MILLIGRAM(S): at 11:58

## 2017-06-09 RX ADMIN — Medication 3 MILLILITER(S): at 16:08

## 2017-06-09 RX ADMIN — HEPARIN SODIUM 5000 UNIT(S): 5000 INJECTION INTRAVENOUS; SUBCUTANEOUS at 06:16

## 2017-06-09 RX ADMIN — Medication 1 TABLET(S): at 17:08

## 2017-06-09 RX ADMIN — Medication 40 MILLIGRAM(S): at 09:29

## 2017-06-09 RX ADMIN — Medication 200 MILLIGRAM(S): at 06:16

## 2017-06-09 NOTE — CONSULT NOTE ADULT - PROBLEM SELECTOR RECOMMENDATION 5
Discussion had with patients wife about his prognosis. She stated that as per heme-onc, they will start him on a new medication and continue with blood transfusion and see how he improves. She remains hoepfully. Patients wishes that the end of life her also discussed with the wife and she stated that her and he children are onboard for a DNR order, but no all onboard for a DNI. A MOLST form was reviewed with her and she stated that she will review it with children this weekend and will f/u on Monday. #0 minutes was spent speaking with the wife. Discussion had with patients wife about his prognosis. She stated that as per heme-onc, they will start him on a new medication and continue with blood transfusion and see how he improves. She remains hopefully. Patients wishes that the end of life her also discussed with the wife and she stated that her and he children are onboard for a DNR order, but not all onboard for a DNI. A MOLST form was reviewed with her and she stated that she will review it with children this weekend and will f/u on Monday. #0 minutes was spent speaking with the wife.

## 2017-06-09 NOTE — PROGRESS NOTE ADULT - PROBLEM SELECTOR PLAN 5
Patient's respiratory status is improved - comfortable on nasal cannula  Monitor for signs of overload during transfusions   S/P 5d course of vanc/zosyn HAP

## 2017-06-09 NOTE — CONSULT NOTE ADULT - PROBLEM SELECTOR RECOMMENDATION 3
- Bedside U/S with R sided consolidation and minimal bilateral effusions  - hold off further imaging at this time - recent CT 5/17, however, could consider if clinical status does not improve  - ?aspiration risk, would follow aspiration precautions  - PT consult
No active distress at this time with Nasal cannula 02. If severe distress noted, could consider low dose opioids.

## 2017-06-09 NOTE — CONSULT NOTE ADULT - PROBLEM SELECTOR RECOMMENDATION 9
As per Heme, patient will most likely be unable to tolerate aggressive chemotherapy. Continue supportive care. Appreciate Heme-onc involvement.

## 2017-06-09 NOTE — PROGRESS NOTE ADULT - PROBLEM SELECTOR PLAN 6
Inpt heme discussed with outpt hematologist Dr. Mathew who recommended palliative evaluation for hospice given progressive functional decline. Palliative consult placed.

## 2017-06-09 NOTE — CONSULT NOTE ADULT - ASSESSMENT
86 year old man with  CLL, bronchiectasis and debility. 86 year old man with  CLL, anemia and debility.

## 2017-06-09 NOTE — CONSULT NOTE ADULT - SUBJECTIVE AND OBJECTIVE BOX
HPI:  86 year old man with h/o CLL c/b relapsed warm AIHA s/p chemotherapy with rituximab, cyclophosphamide, and dexamethasone (last tx ~1 month ago), HTN, HLD, hypothyroidism, BPH, bronchiectasis, RLL PNA c/b parapneumonic effusion s/p CT and MIST II tx protocol, and recent admission (5/1-5/9/17) for LLL PNA presents after being referred to the ED by pt's pulmonologist after routine outpatient labs drawn on 5/25/17 revealed Ca 6.1.     PERTINENT PMH REVIEWED:  [x ] YES [ ] NO           SOCIAL HISTORY:  Significant other/partner:  [ x] YES  [ ] NO            Children:  [ x] YES  [ ] NO                   Restoration/Spirituality:  Subtance hx:  [ ] YES   [x ] NO           Tobacco hx:  [ ] YES  [ ] NO             Alcohol hx: [ ] YES  [ ] NO        Home Opioid hx:  [ ] YES  [ ] NO   Living Situation: [x ] Home  [ ] Long term care  [ ] Rehab    REFERRALS:   [ ] Chaplaincy  [ ] Hospice  [ ] Child Life  [ ] Social Work  [ ] Case management [ ] Holistic Therapy     FAMILY HISTORY:  No pertinent family history in first degree relatives  Family history of cerebrovascular accident (CVA)  No pertinent family history in first degree relatives    [ ] Family history non contributory     BASELINE ADLs (prior to admission):  Independent [x ] moderately [ ] fully   Dependent   [ ] moderately [ ] fully    ADVANCE DIRECTIVES:  [ ] YES [ x] NO   DNR [ ] YES [ x] NO                      MOLST  [ ] YES [ x] NO    Living Will  [ ] YES [ x] NO    Health Care Proxy [ ] YES  [ ] NO      [ ] Surrogate  [ ] HCP  [ ] Guardian:                                                                  Phone#:    Allergies    No Known Drug Allergies  to some types of seafood (Unknown)    Intolerances        MEDICATIONS  (STANDING):  heparin  Injectable 5000Unit(s) SubCutaneous every 12 hours  ALBUTerol/ipratropium for Nebulization 3milliLiter(s) Nebulizer every 6 hours  finasteride 5milliGRAM(s) Oral daily  atorvastatin 20milliGRAM(s) Oral at bedtime  levothyroxine 100MICROGram(s) Oral daily  folic acid 1milliGRAM(s) Oral daily  allopurinol 300milliGRAM(s) Oral daily  calcium carbonate 1250 mG + Vitamin D (OsCal 500 + D) 1Tablet(s) Oral two times a day  calcium gluconate IVPB 1Gram(s) IV Intermittent every 1 hour  ergocalciferol [id number](s) Oral every week  amLODIPine   Tablet 10milliGRAM(s) Oral daily  mycophenolate mofetil 250milliGRAM(s) Oral two times a day    MEDICATIONS  (PRN):  guaiFENesin    Syrup 200milliGRAM(s) Oral every 6 hours PRN Cough      PRESENT SYMPTOMS:  Source: [ ] Patient   [ ] Family   [ ] Team     Pain: [ ] YES [ ] NO  OLDCARTS:     Dyspnea: [ ] YES [ ] NO   Anxiety: [ ] YES [ ] NO  Fatigue: [ ] YES [ ] NO   Nausea: [ ] YES [ ] NO  Loss of appetite: [ ] YES [ ] NO   Constipation: [ ] YES [ ] NO     Other Symptoms:  [ ] All other review of systems negative   [ ] Unable to obtain due to poor mentation     Karnofsky Performance Score/Palliative Performance Status Version 2:         %  Protein Calorie Malnutrition:  [ ] Mild   [ ] Moderate   [ ] Severe     Vital Signs Last 24 Hrs  T(C): 36.9, Max: 37 (06-08 @ 15:24)  T(F): 98.4, Max: 98.6 (06-08 @ 15:24)  HR: 67 (67 - 98)  BP: 135/47 (123/49 - 139/66)  BP(mean): --  RR: 16 (16 - 18)  SpO2: 98% (94% - 99%)    Physical Exam:    General: [ ] Alert,  A&O x     [ ] lethargic   [ ] Agitated   [ ] Cachexia   HEENT: [ ] Normal   [ ] Dry mouth   [ ] ET Tube    [ ] Trach   Lungs: [ ] Clear [ ] Rhonchi  [ ] Crackles [ ] Wheezing [ ] Tachypnea  [ ] Audible excessive secretions   Cardiovascular:  [ ] Regular rate and rhythm  [ ] Irregular [ ] Tachycardia   [ ] Bradycardia   Abdomen: [ ] Soft  [ ] Distended  [ ]  [ ] +BS  [ ] Non tender [ ] Tender  [ ]PEG   [ ] NGT   Last BM:     Genitourinary: [ ] Normal   [ ] Incontinent   [ ] Oliguria/Anuria   [ ] Stapleton  Musculoskeletal:  [ ] Normal   [ ] Generalized weakness  [ ] Bedbound   Neurological: [ ] No focal deficits  [ ] Cognitive impairment     Skin: [ ] Normal   [ ] Pressure ulcers     LABS:                        6.0    87.05 )-----------( 130      ( 09 Jun 2017 06:10 )             20.8     06-09    139  |  97<L>  |  30<H>  ----------------------------<  116<H>  5.0   |  27  |  1.02    Ca    8.8      09 Jun 2017 06:10  Phos  3.1     06-09  Mg     1.9     06-09          I&O's Summary  I & Os for 24h ending 09 Jun 2017 07:00  =============================================  IN: 0 ml / OUT: 300 ml / NET: -300 ml    I & Os for current day (as of 09 Jun 2017 10:03)  =============================================  IN: 0 ml / OUT: 100 ml / NET: -100 ml      RADIOLOGY & ADDITIONAL STUDIES: HPI:  86 year old man with h/o CLL c/b relapsed warm AIHA s/p chemotherapy with rituximab, cyclophosphamide, and dexamethasone (last tx ~1 month ago), HTN, HLD, hypothyroidism, BPH, bronchiectasis, RLL PNA c/b parapneumonic effusion s/p CT and MIST II tx protocol, and recent admission (5/1-5/9/17) for LLL PNA presents after being referred to the ED by pt's pulmonologist after routine outpatient labs drawn on 5/25/17 revealed Ca 6.1.     PERTINENT PMH REVIEWED:  [x ] YES [ ] NO           SOCIAL HISTORY:  Significant other/partner:  [ x] YES  [ ] NO            Children:  [ x] YES  [ ] NO                   Spiritism/Spirituality:  Subtance hx:  [ ] YES   [x ] NO           Tobacco hx:  [ ] YES  [ ] NO             Alcohol hx: [ ] YES  [ ] NO        Home Opioid hx:  [ ] YES  [ ] NO   Living Situation: [x ] Home  [ ] Long term care  [ ] Rehab    REFERRALS:   [ ] Chaplaincy  [ ] Hospice  [ ] Child Life  [ ] Social Work  [ ] Case management [ ] Holistic Therapy     FAMILY HISTORY:  No pertinent family history in first degree relatives  Family history of cerebrovascular accident (CVA)  No pertinent family history in first degree relatives    [ ] Family history non contributory     BASELINE ADLs (prior to admission):  Independent [x ] moderately [ ] fully   Dependent   [ ] moderately [ ] fully    ADVANCE DIRECTIVES:  [ ] YES [ x] NO   DNR [ ] YES [ x] NO                      MOLST  [ ] YES [ x] NO    Living Will  [ ] YES [ x] NO    Health Care Proxy [ ] YES  [ ] NO      [x ] Surrogate  [ ] HCP  [ ] Guardian:       Kyle Gonzalez                                                      Phone#:[phone]    Allergies    No Known Drug Allergies  to some types of seafood (Unknown)    Intolerances        MEDICATIONS  (STANDING):  heparin  Injectable 5000Unit(s) SubCutaneous every 12 hours  ALBUTerol/ipratropium for Nebulization 3milliLiter(s) Nebulizer every 6 hours  finasteride 5milliGRAM(s) Oral daily  atorvastatin 20milliGRAM(s) Oral at bedtime  levothyroxine 100MICROGram(s) Oral daily  folic acid 1milliGRAM(s) Oral daily  allopurinol 300milliGRAM(s) Oral daily  calcium carbonate 1250 mG + Vitamin D (OsCal 500 + D) 1Tablet(s) Oral two times a day  calcium gluconate IVPB 1Gram(s) IV Intermittent every 1 hour  ergocalciferol [id number](s) Oral every week  amLODIPine   Tablet 10milliGRAM(s) Oral daily  mycophenolate mofetil 250milliGRAM(s) Oral two times a day    MEDICATIONS  (PRN):  guaiFENesin    Syrup 200milliGRAM(s) Oral every 6 hours PRN Cough      PRESENT SYMPTOMS:  Source: [ ] Patient   [ ] Family   [ ] Team     Pain: [ ] YES [x ] NO  OLDCARTS:     Dyspnea: [ ] YES [x ] NO   Anxiety: [ ] YES [ x] NO  Fatigue: [x ] YES [ ] NO   Nausea: [ ] YES [x ] NO  Loss of appetite: [ ] YES [x ] NO   Constipation: [ ] YES [x ] NO     Other Symptoms:  [x ] All other review of systems negative   [ ] Unable to obtain due to poor mentation     Karnofsky Performance Score/Palliative Performance Status Version 2:         50%  Protein Calorie Malnutrition:  [ ] Mild   [x ] Moderate   [ ] Severe     Vital Signs Last 24 Hrs  T(C): 36.9, Max: 37 (06-08 @ 15:24)  T(F): 98.4, Max: 98.6 (06-08 @ 15:24)  HR: 67 (67 - 98)  BP: 135/47 (123/49 - 139/66)  BP(mean): --  RR: 16 (16 - 18)  SpO2: 98% (94% - 99%)    Physical Exam:    General: [ ] Alert,  A&O x     [ ] lethargic   [ ] Agitated   [ ] Cachexia   HEENT: [ ] Normal   [ ] Dry mouth   [ ] ET Tube    [ ] Trach   Lungs: [ ] Clear [ ] Rhonchi  [ ] Crackles [ ] Wheezing [ ] Tachypnea  [ ] Audible excessive secretions   Cardiovascular:  [ ] Regular rate and rhythm  [ ] Irregular [ ] Tachycardia   [ ] Bradycardia   Abdomen: [ ] Soft  [ ] Distended  [ ]  [ ] +BS  [ ] Non tender [ ] Tender  [ ]PEG   [ ] NGT   Last BM:     Genitourinary: [ ] Normal   [ ] Incontinent   [ ] Oliguria/Anuria   [ ] Stapleton  Musculoskeletal:  [ ] Normal   [ ] Generalized weakness  [ ] Bedbound   Neurological: [ ] No focal deficits  [ ] Cognitive impairment     Skin: [ ] Normal   [ ] Pressure ulcers     LABS:                        6.0    87.05 )-----------( 130      ( 09 Jun 2017 06:10 )             20.8     06-09    139  |  97<L>  |  30<H>  ----------------------------<  116<H>  5.0   |  27  |  1.02    Ca    8.8      09 Jun 2017 06:10  Phos  3.1     06-09  Mg     1.9     06-09          I&O's Summary  I & Os for 24h ending 09 Jun 2017 07:00  =============================================  IN: 0 ml / OUT: 300 ml / NET: -300 ml    I & Os for current day (as of 09 Jun 2017 10:03)  =============================================  IN: 0 ml / OUT: 100 ml / NET: -100 ml      RADIOLOGY & ADDITIONAL STUDIES:      EXAM:  RAD CHEST PORTABLE URGENT      PROCEDURE DATE:  Jun 2 2017     FINDINGS:  Small bilateral pleural effusions, unchanged. Interstitial edema,   slightly improved from prior study. No pneumothorax.    IMPRESSION: Interstitial edema, slightly improved from prior study. Small   bilateral pleural effusions. HPI:  86 year old man with h/o CLL c/b relapsed warm AIHA s/p chemotherapy with rituximab, cyclophosphamide, and dexamethasone (last tx ~1 month ago), HTN, HLD, hypothyroidism, BPH, bronchiectasis, RLL PNA c/b parapneumonic effusion s/p CT and MIST II tx protocol, and recent admission (5/1-5/9/17) for LLL PNA presents after being referred to the ED by pt's pulmonologist after routine outpatient labs drawn on 5/25/17 revealed Ca 6.1.     PERTINENT PMH REVIEWED:  [x ] YES [ ] NO           SOCIAL HISTORY:  Significant other/partner:  [ x] YES  [ ] NO            Children:  [ x] YES  [ ] NO                   Yarsani/Spirituality:  Subtance hx:  [ ] YES   [x ] NO           Tobacco hx:  [ ] YES  [x ] NO             Alcohol hx: [ ] YES  [x ] NO        Home Opioid hx:  [ ] YES  [x ] NO   Living Situation: [x ] Home  [ ] Long term care  [ ] Rehab    REFERRALS:   [ ] Chaplaincy  [ ] Hospice  [ ] Child Life  [ ] Social Work  [ ] Case management [ ] Holistic Therapy     FAMILY HISTORY:  No pertinent family history in first degree relatives  Family history of cerebrovascular accident (CVA)  No pertinent family history in first degree relatives    [ ] Family history non contributory     BASELINE ADLs (prior to admission):  Independent [x ] moderately [ ] fully   Dependent   [ ] moderately [ ] fully    ADVANCE DIRECTIVES:  [ ] YES [ x] NO   DNR [ ] YES [ x] NO                      MOLST  [ ] YES [ x] NO    Living Will  [ ] YES [ x] NO    Health Care Proxy [ ] YES  [ x] NO      [x ] Surrogate  [ ] HCP  [ ] Guardian:       Kyle Gonzalez  (wife)                                                    Phone#:[phone]    Allergies    No Known Drug Allergies  to some types of seafood (Unknown)    Intolerances        MEDICATIONS  (STANDING):  heparin  Injectable 5000Unit(s) SubCutaneous every 12 hours  ALBUTerol/ipratropium for Nebulization 3milliLiter(s) Nebulizer every 6 hours  finasteride 5milliGRAM(s) Oral daily  atorvastatin 20milliGRAM(s) Oral at bedtime  levothyroxine 100MICROGram(s) Oral daily  folic acid 1milliGRAM(s) Oral daily  allopurinol 300milliGRAM(s) Oral daily  calcium carbonate 1250 mG + Vitamin D (OsCal 500 + D) 1Tablet(s) Oral two times a day  calcium gluconate IVPB 1Gram(s) IV Intermittent every 1 hour  ergocalciferol 55916Gpgk(s) Oral every week  amLODIPine   Tablet 10milliGRAM(s) Oral daily  mycophenolate mofetil 250milliGRAM(s) Oral two times a day    MEDICATIONS  (PRN):  guaiFENesin    Syrup 200milliGRAM(s) Oral every 6 hours PRN Cough      PRESENT SYMPTOMS:  Source: [x ] Patient   [ ] Family   [ ] Team     Pain: [ ] YES [x ] NO  OLDCARTS:     Dyspnea: [ ] YES [x ] NO   Anxiety: [ ] YES [ x] NO  Fatigue: [x ] YES [ ] NO   Nausea: [ ] YES [x ] NO  Loss of appetite: [ ] YES [x ] NO   Constipation: [ ] YES [x ] NO     Other Symptoms:  [x ] All other review of systems negative   [ ] Unable to obtain due to poor mentation     Karnofsky Performance Score/Palliative Performance Status Version 2:         40%  Protein Calorie Malnutrition:  [ ] Mild   [x ] Moderate   [ ] Severe     Vital Signs Last 24 Hrs  T(C): 36.9, Max: 37 (06-08 @ 15:24)  T(F): 98.4, Max: 98.6 (06-08 @ 15:24)  HR: 67 (67 - 98)  BP: 135/47 (123/49 - 139/66)  BP(mean): --  RR: 16 (16 - 18)  SpO2: 98% (94% - 99%)    Physical Exam:    General: [x ] Alert,  A&O x 1-2     [ ] lethargic   [ ] Agitated   [ ] Cachexia   HEENT: [ ] Normal   [x ] Dry mouth   [ ] ET Tube    [ ] Trach   Lungs: [x ] Clear [ ] Rhonchi  [ ] Crackles [ ] Wheezing [ ] Tachypnea  [ ] Audible excessive secretions   Cardiovascular:  [ x] Regular rate and rhythm  [ ] Irregular [ ] Tachycardia   [ ] Bradycardia   Abdomen: [ x] Soft  [ ] Distended  [ ]  [ ] +BS  [ ] Non tender [ ] Tender  [ ]PEG   [ ] NGT   Last BM:     Genitourinary: [x ] Normal   [ ] Incontinent   [ ] Oliguria/Anuria   [ ] Stapleton  Musculoskeletal:  [ ] Normal   [x ] Generalized weakness  [ ] Bedbound   Neurological: [x ] No focal deficits  [ ] Cognitive impairment   [+] intermittent   Skin: [x ] Normal   [ ] Pressure ulcers     LABS:                        6.0    87.05 )-----------( 130      ( 09 Jun 2017 06:10 )             20.8     06-09    139  |  97<L>  |  30<H>  ----------------------------<  116<H>  5.0   |  27  |  1.02    Ca    8.8      09 Jun 2017 06:10  Phos  3.1     06-09  Mg     1.9     06-09          I&O's Summary  I & Os for 24h ending 09 Jun 2017 07:00  =============================================  IN: 0 ml / OUT: 300 ml / NET: -300 ml    I & Os for current day (as of 09 Jun 2017 10:03)  =============================================  IN: 0 ml / OUT: 100 ml / NET: -100 ml      RADIOLOGY & ADDITIONAL STUDIES:      EXAM:  RAD CHEST PORTABLE URGENT      PROCEDURE DATE:  Jun 2 2017     FINDINGS:  Small bilateral pleural effusions, unchanged. Interstitial edema,   slightly improved from prior study. No pneumothorax.    IMPRESSION: Interstitial edema, slightly improved from prior study. Small   bilateral pleural effusions. HPI:  86 year old man with h/o CLL c/b relapsed warm AIHA s/p chemotherapy with rituximab, cyclophosphamide, and dexamethasone (last tx ~1 month ago), HTN, HLD, hypothyroidism, BPH, bronchiectasis, RLL PNA c/b parapneumonic effusion s/p CT and MIST II tx protocol, and recent admission (5/1-5/9/17) for LLL PNA presents after being referred to the ED by pt's pulmonologist after routine outpatient labs drawn on 5/25/17 revealed Ca 6.1.     PERTINENT PMH REVIEWED:  [x ] YES [ ] NO           SOCIAL HISTORY:  Significant other/partner:  [ x] YES  [ ] NO            Children:  [ x] YES  [ ] NO                   Cheondoism/Spirituality: Denominational  Subtance hx:  [ ] YES   [x ] NO           Tobacco hx:  [ ] YES  [x ] NO             Alcohol hx: [ ] YES  [x ] NO        Home Opioid hx:  [ ] YES  [x ] NO   Living Situation: [x ] Home  [ ] Long term care  [ ] Rehab    REFERRALS:   [ ] Chaplaincy  [ ] Hospice  [ ] Child Life  [ ] Social Work  [ ] Case management [ ] Holistic Therapy     FAMILY HISTORY:  No pertinent family history in first degree relatives  Family history of cerebrovascular accident (CVA)  No pertinent family history in first degree relatives    [ ] Family history non contributory     BASELINE ADLs (prior to admission):  Independent [x ] moderately [ ] fully   Dependent   [ ] moderately [ ] fully    ADVANCE DIRECTIVES:  [ ] YES [ x] NO   DNR [ ] YES [ x] NO                      MOLST  [ ] YES [ x] NO    Living Will  [ ] YES [ x] NO    Health Care Proxy [ ] YES  [ x] NO      [x ] Surrogate  [ ] HCP  [ ] Guardian:       Kyle Gonzalez  (wife)                                                    Phone#:[phone]    Allergies    No Known Drug Allergies  to some types of seafood (Unknown)    Intolerances        MEDICATIONS  (STANDING):  heparin  Injectable 5000Unit(s) SubCutaneous every 12 hours  ALBUTerol/ipratropium for Nebulization 3milliLiter(s) Nebulizer every 6 hours  finasteride 5milliGRAM(s) Oral daily  atorvastatin 20milliGRAM(s) Oral at bedtime  levothyroxine 100MICROGram(s) Oral daily  folic acid 1milliGRAM(s) Oral daily  allopurinol 300milliGRAM(s) Oral daily  calcium carbonate 1250 mG + Vitamin D (OsCal 500 + D) 1Tablet(s) Oral two times a day  calcium gluconate IVPB 1Gram(s) IV Intermittent every 1 hour  ergocalciferol 26434Eski(s) Oral every week  amLODIPine   Tablet 10milliGRAM(s) Oral daily  mycophenolate mofetil 250milliGRAM(s) Oral two times a day    MEDICATIONS  (PRN):  guaiFENesin    Syrup 200milliGRAM(s) Oral every 6 hours PRN Cough      PRESENT SYMPTOMS:  Source: [x ] Patient   [ ] Family   [ ] Team     Pain: [ ] YES [x ] NO  OLDCARTS:     Dyspnea: [ ] YES [x ] NO   Anxiety: [ ] YES [ x] NO  Fatigue: [x ] YES [ ] NO   Nausea: [ ] YES [x ] NO  Loss of appetite: [ ] YES [x ] NO   Constipation: [ ] YES [x ] NO     Other Symptoms:  [x ] All other review of systems negative   [ ] Unable to obtain due to poor mentation     Karnofsky Performance Score/Palliative Performance Status Version 2:         40%  Protein Calorie Malnutrition:  [ ] Mild   [x ] Moderate   [ ] Severe     Vital Signs Last 24 Hrs  T(C): 36.9, Max: 37 (06-08 @ 15:24)  T(F): 98.4, Max: 98.6 (06-08 @ 15:24)  HR: 67 (67 - 98)  BP: 135/47 (123/49 - 139/66)  BP(mean): --  RR: 16 (16 - 18)  SpO2: 98% (94% - 99%)    Physical Exam:    General: [x ] Alert,  A&O x 1-2     [ ] lethargic   [ ] Agitated   [ ] Cachexia   HEENT: [ ] Normal   [x ] Dry mouth   [ ] ET Tube    [ ] Trach   Lungs: [x ] Clear [ ] Rhonchi  [ ] Crackles [ ] Wheezing [ ] Tachypnea  [ ] Audible excessive secretions   Cardiovascular:  [ x] Regular rate and rhythm  [ ] Irregular [ ] Tachycardia   [ ] Bradycardia   Abdomen: [ x] Soft  [ ] Distended  [ ]  [ ] +BS  [ ] Non tender [ ] Tender  [ ]PEG   [ ] NGT   Last BM:     Genitourinary: [x ] Normal   [ ] Incontinent   [ ] Oliguria/Anuria   [ ] Stapleton  Musculoskeletal:  [ ] Normal   [x ] Generalized weakness  [ ] Bedbound   Neurological: [x ] No focal deficits  [ ] Cognitive impairment   [+] intermittent   Skin: [x ] Normal   [ ] Pressure ulcers     LABS:                        6.0    87.05 )-----------( 130      ( 09 Jun 2017 06:10 )             20.8     06-09    139  |  97<L>  |  30<H>  ----------------------------<  116<H>  5.0   |  27  |  1.02    Ca    8.8      09 Jun 2017 06:10  Phos  3.1     06-09  Mg     1.9     06-09          I&O's Summary  I & Os for 24h ending 09 Jun 2017 07:00  =============================================  IN: 0 ml / OUT: 300 ml / NET: -300 ml    I & Os for current day (as of 09 Jun 2017 10:03)  =============================================  IN: 0 ml / OUT: 100 ml / NET: -100 ml      RADIOLOGY & ADDITIONAL STUDIES:      EXAM:  RAD CHEST PORTABLE URGENT      PROCEDURE DATE:  Jun 2 2017     FINDINGS:  Small bilateral pleural effusions, unchanged. Interstitial edema,   slightly improved from prior study. No pneumothorax.    IMPRESSION: Interstitial edema, slightly improved from prior study. Small   bilateral pleural effusions.

## 2017-06-09 NOTE — PROGRESS NOTE ADULT - SUBJECTIVE AND OBJECTIVE BOX
Patient is a 86y old  Male who presents with a chief complaint of Abnormal outpatient labs (01 Jun 2017 14:45)  Date of Admission:    24H hour events:     MEDICATIONS:  heparin  Injectable 5000Unit(s) SubCutaneous every 12 hours  amLODIPine   Tablet 10milliGRAM(s) Oral daily  furosemide   Injectable 40milliGRAM(s) IV Push two times a day      ALBUTerol/ipratropium for Nebulization 3milliLiter(s) Nebulizer every 6 hours  guaiFENesin    Syrup 200milliGRAM(s) Oral every 6 hours PRN        finasteride 5milliGRAM(s) Oral daily  atorvastatin 20milliGRAM(s) Oral at bedtime  levothyroxine 100MICROGram(s) Oral daily  allopurinol 300milliGRAM(s) Oral daily    folic acid 1milliGRAM(s) Oral daily  calcium carbonate 1250 mG + Vitamin D (OsCal 500 + D) 1Tablet(s) Oral two times a day  calcium gluconate IVPB 1Gram(s) IV Intermittent every 1 hour  ergocalciferol 71716Aami(s) Oral every week  mycophenolate mofetil 250milliGRAM(s) Oral two times a day      REVIEW OF SYSTEMS:  Complete 10point ROS negative.    PHYSICAL EXAM:  T(C): 36.9, Max: 37 (06-08 @ 15:24)  HR: 73 (73 - 98)  BP: 135/47 (123/49 - 139/66)  RR: 16 (16 - 18)  SpO2: 99% (94% - 99%)  Wt(kg): --  I&O's Summary  I & Os for 24h ending 09 Jun 2017 07:00  =============================================  IN: 0 ml / OUT: 300 ml / NET: -300 ml    I & Os for current day (as of 09 Jun 2017 07:56)  =============================================  IN: 0 ml / OUT: 100 ml / NET: -100 ml      Appearance: Normal	  HEENT:   Normal oral mucosa, PERRL, EOMI	  Lymphatic: No lymphadenopathy  Cardiovascular: Normal S1 S2, No JVD, No murmurs, No edema  Respiratory: Lungs clear to auscultation	  Psychiatry: A & O x 3, Mood & affect appropriate  Gastrointestinal:  Soft, Non-tender, + BS	  Skin: No rashes, No ecchymoses, No cyanosis	  Neurologic: Non-focal  Extremities: Normal range of motion, No clubbing, cyanosis or edema  Vascular: Peripheral pulses palpable 2+ bilaterally        LABS:	 	    CBC Full  -  ( 09 Jun 2017 06:10 )  WBC Count : 87.05 K/uL  Hemoglobin : 6.0 g/dL  Hematocrit : 20.8 %  Platelet Count - Automated : 130 K/uL      06-09    139  |  97<L>  |  30<H>  ----------------------------<  116<H>  5.0   |  27  |  1.02  06-08    139  |  97<L>  |  33<H>  ----------------------------<  115<H>  4.4   |  27  |  1.36<H>    Ca    8.8      09 Jun 2017 06:10  Ca    9.0      08 Jun 2017 05:40  Phos  3.1     06-09  Phos  3.6     06-08  Mg     1.9     06-09  Mg     1.5     06-08

## 2017-06-09 NOTE — CHART NOTE - NSCHARTNOTEFT_GEN_A_CORE
NUTRITION SERVICES     Upon Nutritional Assessment by the Registered Dietitian your patient was determined to meet criteria/ has evidence of the following diagnosis/diagnoses:  [ ] Mild Protein Calorie Malnutrition   [ X ] Moderate Protein Calorie Malnutrition   [ ] Severe Protein Calorie Malnutrition   [ ] Unspecified Protein Calorie Malnutrition   [ ] Underweight / BMI <19  [ ] Morbid Obesity / BMI >40    Findings as based on:  •  Comprehensive nutrition assessment and consultation   •  Calorie Counts (nutrient intake analysis)   •  Food acceptance and intake status from observations by staff  •  Follow up  •  Patient education  •  Intervention secondary to interdisciplinary rounds  •   concerns     Treatment:  The following diet has been recommended:    1) Continue Regular diet and continue Glucerna Shakes as he is currently accepting     2) Will provide Mighty Shake 1x/day (500kcal, 23g protein). If patient is willing to accept alternate supplements, recommend more consumption of Mighty Shake or Ensure Enlive instead for higher caloric provision     3) Food preferences will be provided as discussed
Called by RN because patient lost IV (placed US guided earlier in the day by team). Per RN, the nurse could not place the IV. I spoke to the clinical impact nurse who recommended that per protocol, the associate nurse manager should be contacted by the patient's nurse to establish IV access. I spoke to the nurse again and asked her to contact the ANM. RN to inform me again if IV cannot be accessed by the ANM.
Critical care note:    Patient with dyspnea and tachypnea during PRBC transfusion. Called by RN to evaluate patient. Patient found to be hypertensive, tachypneic and in respiratory distress. On exam he had crackles until mid bases and some wheezing. He was tachycardic. Abdominal exam unremarkable. NO LE edema appreciated. Patient was given duonebs, IV lasix 40mg, CXR was performed and patient was placed on bipap. Patient's respiratory status improved s/p lasix and with bipap. Patient was also evaluated by ICU team. I suspect patient developed flash pulmonary edema s/p transfusion. Recent TTE with normal LV function however patient may have diastolic dysfunction. Would monitor strict I/Os and re-evaluate for second dose of IV lasix. Plan discussed with wife at bedside and currently wants everything done. Pt remains full code. I also called daughter Christine and left message for her. Case also discussed personally with ICU fellow. Total time >45 mins.
R1 MEDICINE NIGHT FLOAT NOTE    Called by RN for SOB.  Assessed pt at bedside.  Pt mildly tachypneic, not using accessory muscles, on NC @ 4L.  Satting 93-96% on 4L.  Per RN, pt tried to get out of bed to use the restroom and became tachypneic.  Per RN, pt appeared improved after resting in bed by the time provider arrived at bedside.  On exam, heart normal rate regular rhythm, lungs with bilateral dry crackles at bases (which are apparently chronic per primary team and heme notes) and possible mild expiratory wheeze.  Will give stat duoneb treatment.  Additionally, because pt has received multiple rounds of IV repletion of calcium as well as IV fluids, will obtain CXR to evaluate for possible volume overload.  Will not diurese at this time, as pt appears hemodynamically stable with an acceptable O2 sat.

## 2017-06-09 NOTE — PROGRESS NOTE ADULT - PROBLEM SELECTOR PLAN 1
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

## 2017-06-09 NOTE — CONSULT NOTE ADULT - PROBLEM SELECTOR RECOMMENDATION 2
- patient seen to have acidosis with increase in Bicarb on recent ABG  - repeat BMP, monitor BiCarb, and creatinine  - repeat ABG for acidosis  - monitor lytes
Patient was recently started on Cellcept, tolerating at this time. Wife is hopeful that he will improve.

## 2017-06-10 DIAGNOSIS — E46 UNSPECIFIED PROTEIN-CALORIE MALNUTRITION: ICD-10-CM

## 2017-06-10 LAB
BUN SERPL-MCNC: 36 MG/DL — HIGH (ref 7–23)
CALCIUM SERPL-MCNC: 9.4 MG/DL — SIGNIFICANT CHANGE UP (ref 8.4–10.5)
CHLORIDE SERPL-SCNC: 99 MMOL/L — SIGNIFICANT CHANGE UP (ref 98–107)
CO2 SERPL-SCNC: 28 MMOL/L — SIGNIFICANT CHANGE UP (ref 22–31)
CREAT SERPL-MCNC: 1.12 MG/DL — SIGNIFICANT CHANGE UP (ref 0.5–1.3)
GLUCOSE SERPL-MCNC: 113 MG/DL — HIGH (ref 70–99)
HCT VFR BLD CALC: 21.5 % — LOW (ref 39–50)
HGB BLD-MCNC: 6.5 G/DL — CRITICAL LOW (ref 13–17)
MAGNESIUM SERPL-MCNC: 2 MG/DL — SIGNIFICANT CHANGE UP (ref 1.6–2.6)
MCHC RBC-ENTMCNC: 29.4 PG — SIGNIFICANT CHANGE UP (ref 27–34)
MCHC RBC-ENTMCNC: 30.2 % — LOW (ref 32–36)
MCV RBC AUTO: 97.3 FL — SIGNIFICANT CHANGE UP (ref 80–100)
PHOSPHATE SERPL-MCNC: 3.2 MG/DL — SIGNIFICANT CHANGE UP (ref 2.5–4.5)
PLATELET # BLD AUTO: 137 K/UL — LOW (ref 150–400)
PMV BLD: 9.2 FL — SIGNIFICANT CHANGE UP (ref 7–13)
POTASSIUM SERPL-MCNC: 5 MMOL/L — SIGNIFICANT CHANGE UP (ref 3.5–5.3)
POTASSIUM SERPL-SCNC: 5 MMOL/L — SIGNIFICANT CHANGE UP (ref 3.5–5.3)
RBC # BLD: 2.21 M/UL — LOW (ref 4.2–5.8)
RBC # FLD: 29.5 % — HIGH (ref 10.3–14.5)
SODIUM SERPL-SCNC: 139 MMOL/L — SIGNIFICANT CHANGE UP (ref 135–145)
WBC # BLD: 94.77 K/UL — CRITICAL HIGH (ref 3.8–10.5)
WBC # FLD AUTO: 94.77 K/UL — CRITICAL HIGH (ref 3.8–10.5)

## 2017-06-10 PROCEDURE — 99233 SBSQ HOSP IP/OBS HIGH 50: CPT | Mod: GC

## 2017-06-10 PROCEDURE — 99231 SBSQ HOSP IP/OBS SF/LOW 25: CPT | Mod: GC

## 2017-06-10 RX ADMIN — Medication 1 MILLIGRAM(S): at 11:13

## 2017-06-10 RX ADMIN — Medication 1 TABLET(S): at 05:45

## 2017-06-10 RX ADMIN — Medication 1 TABLET(S): at 17:24

## 2017-06-10 RX ADMIN — Medication 3 MILLILITER(S): at 15:43

## 2017-06-10 RX ADMIN — AMLODIPINE BESYLATE 10 MILLIGRAM(S): 2.5 TABLET ORAL at 05:45

## 2017-06-10 RX ADMIN — Medication 100 MICROGRAM(S): at 05:45

## 2017-06-10 RX ADMIN — MYCOPHENOLATE MOFETIL 250 MILLIGRAM(S): 250 CAPSULE ORAL at 05:45

## 2017-06-10 RX ADMIN — MYCOPHENOLATE MOFETIL 250 MILLIGRAM(S): 250 CAPSULE ORAL at 17:24

## 2017-06-10 RX ADMIN — HEPARIN SODIUM 5000 UNIT(S): 5000 INJECTION INTRAVENOUS; SUBCUTANEOUS at 17:24

## 2017-06-10 RX ADMIN — HEPARIN SODIUM 5000 UNIT(S): 5000 INJECTION INTRAVENOUS; SUBCUTANEOUS at 05:45

## 2017-06-10 RX ADMIN — Medication 300 MILLIGRAM(S): at 11:13

## 2017-06-10 RX ADMIN — Medication 3 MILLILITER(S): at 09:55

## 2017-06-10 RX ADMIN — Medication 3 MILLILITER(S): at 04:23

## 2017-06-10 RX ADMIN — FINASTERIDE 5 MILLIGRAM(S): 5 TABLET, FILM COATED ORAL at 11:14

## 2017-06-10 RX ADMIN — ATORVASTATIN CALCIUM 20 MILLIGRAM(S): 80 TABLET, FILM COATED ORAL at 22:14

## 2017-06-10 RX ADMIN — Medication 3 MILLILITER(S): at 21:54

## 2017-06-10 NOTE — PROGRESS NOTE ADULT - PROBLEM SELECTOR PLAN 5
Likely 2/2 fluid overload in setting of received blood transfusion, respiratory status now improved, comfortable on NC   - c/w O2 supplementation prn  - monitor for signs of overload during transfusions and given lasix prn  - s/p 5 day course of vanc/zosyn for HAP  - appreciate Pulm recs Hematology team discussed case with Dr. Mathew who recommended palliative evaluation for hospice given progressive functional decline.   - Per Heme, pt with poor PS and unlikely to tolerate chemotherapy at this time  - pt seen by Palliative care yesterday, pt is DNR but not DNI, wife given MOLST form and awaiting for her to speak with rest of family and f/u on Monday

## 2017-06-10 NOTE — PROGRESS NOTE ADULT - PROBLEM SELECTOR PLAN 6
Hematology team discussed case with Dr. Mathew who recommended palliative evaluation for hospice given progressive functional decline.   - Per Heme, pt with poor PS and unlikely to tolerate chemotherapy at this time  - pt seen by Palliative care yesterday, pt is DNR but not DNI, wife given MOLST form and awaiting for her to speak with rest of family and f/u on Monday Pt with BMI of 19.5  - appreciate Nutrition eval  - c/w regular diet with Glucerna shakes

## 2017-06-10 NOTE — PROGRESS NOTE ADULT - SUBJECTIVE AND OBJECTIVE BOX
Patient is a 86y old  Male who presents with a chief complaint of Abnormal outpatient labs (01 Jun 2017 14:45)  Date of Admission:    24H hour events: No acute events overnight, pt received 1 unit of pRBC yesterday, reports no CP or shortness of breath this morning. No fevers/chills, no n/v/d/abd pain. Reports having a BM yesterday.     MEDICATIONS  (STANDING):  heparin  Injectable 5000Unit(s) SubCutaneous every 12 hours  ALBUTerol/ipratropium for Nebulization 3milliLiter(s) Nebulizer every 6 hours  finasteride 5milliGRAM(s) Oral daily  atorvastatin 20milliGRAM(s) Oral at bedtime  levothyroxine 100MICROGram(s) Oral daily  folic acid 1milliGRAM(s) Oral daily  allopurinol 300milliGRAM(s) Oral daily  calcium carbonate 1250 mG + Vitamin D (OsCal 500 + D) 1Tablet(s) Oral two times a day  calcium gluconate IVPB 1Gram(s) IV Intermittent every 1 hour  ergocalciferol 66262Efvx(s) Oral every week  amLODIPine   Tablet 10milliGRAM(s) Oral daily  mycophenolate mofetil 250milliGRAM(s) Oral two times a day    MEDICATIONS  (PRN):  guaiFENesin    Syrup 200milliGRAM(s) Oral every 6 hours PRN Cough      PHYSICAL EXAM:  Vital Signs Last 24 Hrs  T(C): 37.1, Max: 37.1 (06-10 @ 05:41)  T(F): 98.7, Max: 98.7 (06-10 @ 05:41)  HR: 71 (67 - 92)  BP: 113/53 (113/53 - 137/46)  BP(mean): --  RR: 18 (16 - 18)  SpO2: 98% (96% - 100%)    I&O's Summary    I & Os for current day (as of 10 Eliecer 2017 08:30)  =============================================  IN: 120 ml / OUT: 1150 ml / NET: -1030 ml      PHYSICAL EXAM:  GENERAL: resting in bed comfortably, thin and chronically ill appearing   EYES: EOMI, PERRLA, sclera clear  NECK: supple, no JVD  CHEST/LUNG: clear to auscultation bilaterally; no wheezing/rales/rhonchi  HEART: regular rate and rhythm  ABDOMEN: bowel sounds present, soft, non tender, non distended  EXTREMITIES:  no edema, no clubbing or cyanosis, 2+ DP pulses  PSYCH: AAOx3  NEUROLOGY: no focal deficits  SKIN: warm and dry    LABS:	 	                          6.5    94.77 )-----------( 137      ( 10 Eliecer 2017 05:35 )             21.5     06-10    139  |  99  |  36<H>  ----------------------------<  113<H>  5.0   |  28  |  1.12    Ca    9.4      10 Eliecer 2017 05:35  Phos  3.2     06-10  Mg     2.0     06-10 Patient is a 86y old  Male who presents with a chief complaint of Abnormal outpatient labs (01 Jun 2017 14:45)  Date of Admission:    24H hour events: No acute events overnight, pt received 1 unit of pRBC yesterday, reports no CP or shortness of breath this morning. No fevers/chills, no n/v/d/abd pain. Reports having a BM yesterday.     MEDICATIONS  (STANDING):  heparin  Injectable 5000Unit(s) SubCutaneous every 12 hours  ALBUTerol/ipratropium for Nebulization 3milliLiter(s) Nebulizer every 6 hours  finasteride 5milliGRAM(s) Oral daily  atorvastatin 20milliGRAM(s) Oral at bedtime  levothyroxine 100MICROGram(s) Oral daily  folic acid 1milliGRAM(s) Oral daily  allopurinol 300milliGRAM(s) Oral daily  calcium carbonate 1250 mG + Vitamin D (OsCal 500 + D) 1Tablet(s) Oral two times a dayhour  ergocalciferol 62981Eprh(s) Oral every week  amLODIPine   Tablet 10milliGRAM(s) Oral daily  mycophenolate mofetil 250milliGRAM(s) Oral two times a day    MEDICATIONS  (PRN):  guaiFENesin    Syrup 200milliGRAM(s) Oral every 6 hours PRN Cough      PHYSICAL EXAM:  Vital Signs Last 24 Hrs  T(C): 37.1, Max: 37.1 (06-10 @ 05:41)  T(F): 98.7, Max: 98.7 (06-10 @ 05:41)  HR: 71 (67 - 92)  BP: 113/53 (113/53 - 137/46)  BP(mean): --  RR: 18 (16 - 18)  SpO2: 98% (96% - 100%)    I&O's Summary    I & Os for current day (as of 10 Eliecer 2017 08:30)  =============================================  IN: 120 ml / OUT: 1150 ml / NET: -1030 ml      PHYSICAL EXAM:  GENERAL: resting in bed comfortably, thin and chronically ill appearing   EYES: EOMI, PERRLA, sclera clear  NECK: supple, no JVD  CHEST/LUNG: clear to auscultation bilaterally; no wheezing/rales/rhonchi  HEART: regular rate and rhythm  ABDOMEN: bowel sounds present, soft, non tender, non distended  EXTREMITIES:  no edema, no clubbing or cyanosis, 2+ DP pulses  PSYCH: AAOx3  NEUROLOGY: no focal deficits  SKIN: warm and dry    LABS:	 	                          6.5    94.77 )-----------( 137      ( 10 Eliecer 2017 05:35 )             21.5     06-10    139  |  99  |  36<H>  ----------------------------<  113<H>  5.0   |  28  |  1.12    Ca    9.4      10 Eliecer 2017 05:35  Phos  3.2     06-10  Mg     2.0     06-10

## 2017-06-10 NOTE — PROGRESS NOTE ADULT - PROBLEM SELECTOR PLAN 4
S/p RCD ~1.5 month ago. Follows with Dr. Lux Matehw as outpatient.  - Per Heme, pt with poor PS and unlikely to tolerate chemotherapy at this time  - Trial of cellcept and recommended Palliative care c/s  - pt seen by Palliative care yesterday, pt is DNR but not DNI, wife given MOLST form and awaiting for her to speak with rest of family and f/u on Monday Likely 2/2 fluid overload in setting of received blood transfusion, respiratory status now improved, comfortable on NC   - c/w O2 supplementation prn  - monitor for signs of overload during transfusions and given lasix prn  - s/p 5 day course of vanc/zosyn for HAP  - appreciate Pulm recs

## 2017-06-10 NOTE — PROGRESS NOTE ADULT - PROBLEM SELECTOR PLAN 1
started cellcept, trend CBC daily while here  -would transfuse if Hgb <6.5 or if patient is symptomatic given his difficulty receiving transfusion earlier this visit with flash pulmonary edema

## 2017-06-10 NOTE — PROGRESS NOTE ADULT - SUBJECTIVE AND OBJECTIVE BOX
INTERVAL HPI/OVERNIGHT EVENTS:  Patient S&E at bedside. No o/n events, patient resting comfortably, denies any acute problems     VITAL SIGNS:  T(F): 98.7  HR: 85  BP: 113/53  RR: 18  SpO2: 98%  Wt(kg): --    PHYSICAL EXAM:    Constitutional: NAD  Eyes: EOMI, sclera non-icteric  Neck: supple, no masses, no JVD  Respiratory: CTA b/l, good air entry b/l  Cardiovascular: RRR, no M/R/G  Gastrointestinal: soft, NTND, BS+  Extremities: no c/c/e  Neurological: AAOx2-3      MEDICATIONS  (STANDING):  heparin  Injectable 5000Unit(s) SubCutaneous every 12 hours  ALBUTerol/ipratropium for Nebulization 3milliLiter(s) Nebulizer every 6 hours  finasteride 5milliGRAM(s) Oral daily  atorvastatin 20milliGRAM(s) Oral at bedtime  levothyroxine 100MICROGram(s) Oral daily  folic acid 1milliGRAM(s) Oral daily  allopurinol 300milliGRAM(s) Oral daily  calcium carbonate 1250 mG + Vitamin D (OsCal 500 + D) 1Tablet(s) Oral two times a day  ergocalciferol [id number](s) Oral every week  amLODIPine   Tablet 10milliGRAM(s) Oral daily  mycophenolate mofetil 250milliGRAM(s) Oral two times a day    MEDICATIONS  (PRN):  guaiFENesin    Syrup 200milliGRAM(s) Oral every 6 hours PRN Cough      Allergies    No Known Drug Allergies  to some types of seafood (Unknown)    Intolerances        LABS:                        6.5    94.77 )-----------( 137      ( 10 Eliecer 2017 05:35 )             21.5     06-10    139  |  99  |  36<H>  ----------------------------<  113<H>  5.0   |  28  |  1.12    Ca    9.4      10 Eliecer 2017 05:35  Phos  3.2     06-10  Mg     2.0     06-10

## 2017-06-10 NOTE — PROGRESS NOTE ADULT - PROBLEM SELECTOR PLAN 2
Likely in setting of low vitamin D, now improved  - c/w Oscal and weekly 50,000 IU Vit D supplementation initiated  - continue to monitor

## 2017-06-10 NOTE — PROGRESS NOTE ADULT - PROBLEM SELECTOR PLAN 2
Advanced disease, currently stable   -chemotherapy would cause more harm than benefit, GOC discussion with family revealed they are pursuing hospice care and will meet on Monday  -appreciate palliative recommendations

## 2017-06-10 NOTE — PROGRESS NOTE ADULT - ASSESSMENT
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

## 2017-06-10 NOTE — PROGRESS NOTE ADULT - PROBLEM SELECTOR PLAN 1
Pt with CLL and likely 2/2 warm AIHA   - Started cellcept on 6/8, per heme unlikely to tolerate aggressive chemo   - Received 1 unit pRBC yesterday for Hgb of 6.0, hgb improved to 6.5 this am (inappropriate response)  - F/U heme recs

## 2017-06-10 NOTE — PROGRESS NOTE ADULT - PROBLEM SELECTOR PLAN 7
- Hold home BP meds for now likely etiology is prerenal in setting of receiving lasix  - KARRI resolved  - continue to monitor

## 2017-06-10 NOTE — PROGRESS NOTE ADULT - ASSESSMENT
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

## 2017-06-10 NOTE — PROGRESS NOTE ADULT - PROBLEM SELECTOR PLAN 3
likely etiology is prerenal in setting of receiving lasix  - KARRI resolved  - continue to monitor S/p RCD ~1.5 month ago. Follows with Dr. Lux Mathew as outpatient.  - Per Heme, pt with poor PS and unlikely to tolerate chemotherapy at this time  - Trial of cellcept and recommended Palliative care c/s  - pt seen by Palliative care yesterday, pt is DNR but not DNI, wife given MOLST form and awaiting for her to speak with rest of family and f/u on Monday

## 2017-06-11 LAB
ANISOCYTOSIS BLD QL: SIGNIFICANT CHANGE UP
BASOPHILS NFR SPEC: 1 % — SIGNIFICANT CHANGE UP (ref 0–2)
BUN SERPL-MCNC: 36 MG/DL — HIGH (ref 7–23)
CALCIUM SERPL-MCNC: 9.9 MG/DL — SIGNIFICANT CHANGE UP (ref 8.4–10.5)
CHLORIDE SERPL-SCNC: 100 MMOL/L — SIGNIFICANT CHANGE UP (ref 98–107)
CO2 SERPL-SCNC: 26 MMOL/L — SIGNIFICANT CHANGE UP (ref 22–31)
CREAT SERPL-MCNC: 1.15 MG/DL — SIGNIFICANT CHANGE UP (ref 0.5–1.3)
EOSINOPHIL NFR FLD: 1 % — SIGNIFICANT CHANGE UP (ref 0–6)
GLUCOSE SERPL-MCNC: 157 MG/DL — HIGH (ref 70–99)
HCT VFR BLD CALC: 20.5 % — CRITICAL LOW (ref 39–50)
HGB BLD-MCNC: 6 G/DL — CRITICAL LOW (ref 13–17)
HYPOCHROMIA BLD QL: SLIGHT — SIGNIFICANT CHANGE UP
LYMPHOCYTES NFR SPEC AUTO: 81 % — HIGH (ref 13–44)
MACROCYTES BLD QL: SLIGHT — SIGNIFICANT CHANGE UP
MAGNESIUM SERPL-MCNC: 2 MG/DL — SIGNIFICANT CHANGE UP (ref 1.6–2.6)
MANUAL SMEAR VERIFICATION: SIGNIFICANT CHANGE UP
MCHC RBC-ENTMCNC: 29.3 % — LOW (ref 32–36)
MCHC RBC-ENTMCNC: 30 PG — SIGNIFICANT CHANGE UP (ref 27–34)
MCV RBC AUTO: 102.5 FL — HIGH (ref 80–100)
MICROCYTES BLD QL: SIGNIFICANT CHANGE UP
MONOCYTES NFR BLD: 4 % — SIGNIFICANT CHANGE UP (ref 2–9)
NEUTROPHIL AB SER-ACNC: 13 % — LOW (ref 43–77)
PHOSPHATE SERPL-MCNC: 3.8 MG/DL — SIGNIFICANT CHANGE UP (ref 2.5–4.5)
PLATELET # BLD AUTO: 178 K/UL — SIGNIFICANT CHANGE UP (ref 150–400)
PLATELET COUNT - ESTIMATE: NORMAL — SIGNIFICANT CHANGE UP
PMV BLD: 9 FL — SIGNIFICANT CHANGE UP (ref 7–13)
POLYCHROMASIA BLD QL SMEAR: SLIGHT — SIGNIFICANT CHANGE UP
POTASSIUM SERPL-MCNC: 4.6 MMOL/L — SIGNIFICANT CHANGE UP (ref 3.5–5.3)
POTASSIUM SERPL-SCNC: 4.6 MMOL/L — SIGNIFICANT CHANGE UP (ref 3.5–5.3)
RBC # BLD: 2 M/UL — LOW (ref 4.2–5.8)
RBC # FLD: 31.3 % — HIGH (ref 10.3–14.5)
SODIUM SERPL-SCNC: 141 MMOL/L — SIGNIFICANT CHANGE UP (ref 135–145)
WBC # BLD: 108.51 K/UL — CRITICAL HIGH (ref 3.8–10.5)
WBC # FLD AUTO: 108.51 K/UL — CRITICAL HIGH (ref 3.8–10.5)

## 2017-06-11 PROCEDURE — 99233 SBSQ HOSP IP/OBS HIGH 50: CPT | Mod: GC

## 2017-06-11 RX ORDER — FUROSEMIDE 40 MG
40 TABLET ORAL ONCE
Qty: 0 | Refills: 0 | Status: COMPLETED | OUTPATIENT
Start: 2017-06-11 | End: 2017-06-11

## 2017-06-11 RX ADMIN — HEPARIN SODIUM 5000 UNIT(S): 5000 INJECTION INTRAVENOUS; SUBCUTANEOUS at 06:32

## 2017-06-11 RX ADMIN — Medication 300 MILLIGRAM(S): at 11:49

## 2017-06-11 RX ADMIN — Medication 3 MILLILITER(S): at 22:52

## 2017-06-11 RX ADMIN — AMLODIPINE BESYLATE 10 MILLIGRAM(S): 2.5 TABLET ORAL at 06:31

## 2017-06-11 RX ADMIN — HEPARIN SODIUM 5000 UNIT(S): 5000 INJECTION INTRAVENOUS; SUBCUTANEOUS at 17:40

## 2017-06-11 RX ADMIN — ATORVASTATIN CALCIUM 20 MILLIGRAM(S): 80 TABLET, FILM COATED ORAL at 21:31

## 2017-06-11 RX ADMIN — Medication 3 MILLILITER(S): at 15:25

## 2017-06-11 RX ADMIN — Medication 3 MILLILITER(S): at 03:58

## 2017-06-11 RX ADMIN — Medication 1 TABLET(S): at 17:40

## 2017-06-11 RX ADMIN — MYCOPHENOLATE MOFETIL 250 MILLIGRAM(S): 250 CAPSULE ORAL at 06:31

## 2017-06-11 RX ADMIN — Medication 1 MILLIGRAM(S): at 11:48

## 2017-06-11 RX ADMIN — Medication 3 MILLILITER(S): at 09:39

## 2017-06-11 RX ADMIN — Medication 40 MILLIGRAM(S): at 11:49

## 2017-06-11 RX ADMIN — Medication 1 TABLET(S): at 06:31

## 2017-06-11 RX ADMIN — MYCOPHENOLATE MOFETIL 250 MILLIGRAM(S): 250 CAPSULE ORAL at 17:40

## 2017-06-11 RX ADMIN — Medication 100 MICROGRAM(S): at 06:31

## 2017-06-11 RX ADMIN — FINASTERIDE 5 MILLIGRAM(S): 5 TABLET, FILM COATED ORAL at 11:49

## 2017-06-11 NOTE — PROVIDER CONTACT NOTE (OTHER) - ASSESSMENT
Pt is alert and oriented. Patient currently sitting up in chair at side of bed. Patient in no acute distress at this time receiving blood transfusion. all other vital signs stable

## 2017-06-11 NOTE — PROGRESS NOTE ADULT - SUBJECTIVE AND OBJECTIVE BOX
Patient is a 86y old  Male who presents with a chief complaint of Abnormal outpatient labs (01 Jun 2017 14:45)      24H hour events: No events ON. No worsening SOB. Denies chest pain, n/v, constipation or diarrhea    MEDICATIONS:  heparin  Injectable 5000Unit(s) SubCutaneous every 12 hours  amLODIPine   Tablet 10milliGRAM(s) Oral daily      ALBUTerol/ipratropium for Nebulization 3milliLiter(s) Nebulizer every 6 hours  guaiFENesin    Syrup 200milliGRAM(s) Oral every 6 hours PRN        finasteride 5milliGRAM(s) Oral daily  atorvastatin 20milliGRAM(s) Oral at bedtime  levothyroxine 100MICROGram(s) Oral daily  allopurinol 300milliGRAM(s) Oral daily    folic acid 1milliGRAM(s) Oral daily  calcium carbonate 1250 mG + Vitamin D (OsCal 500 + D) 1Tablet(s) Oral two times a day  ergocalciferol 28756Hcim(s) Oral every week  mycophenolate mofetil 250milliGRAM(s) Oral two times a day      REVIEW OF SYSTEMS:  Complete 10point ROS negative.    PHYSICAL EXAM:  T(C): 36.6, Max: 37.2 (06-10 @ 14:58)  HR: 74 (72 - 90)  BP: 122/55 (116/53 - 137/51)  RR: 18 (18 - 18)  SpO2: 100% (98% - 100%)  Wt(kg): --  I&O's Summary    I & Os for current day (as of 11 Jun 2017 07:20)  =============================================  IN: 500 ml / OUT: 1000 ml / NET: -500 ml      Appearance: Normal	  HEENT:   Normal oral mucosa, PERRL, EOMI	  Lymphatic: No lymphadenopathy  Cardiovascular: Normal S1 S2, No JVD, No murmurs, No edema  Respiratory: Lungs clear to auscultation	  Psychiatry: A & O x 3, Mood & affect appropriate  Gastrointestinal:  Soft, Non-tender, + BS	  Skin: No rashes, No ecchymoses, No cyanosis	  Neurologic: Non-focal  Extremities: Normal range of motion, No clubbing, cyanosis or edema  Vascular: Peripheral pulses palpable 2+ bilaterally        LABS:	 	    CBC Full  -  ( 10 Eliecer 2017 05:35 )  WBC Count : 94.77 K/uL  Hemoglobin : 6.5 g/dL  Hematocrit : 21.5 %  Platelet Count - Automated : 137 K/uL  Mean Cell Volume : 97.3 fL  Mean Cell Hemoglobin : 29.4 pg  Mean Cell Hemoglobin Concentration : 30.2 %  Auto Neutrophil # : x  Auto Lymphocyte # : x  Auto Monocyte # : x  Auto Eosinophil # : x  Auto Basophil # : x  Auto Neutrophil % : x  Auto Lymphocyte % : x  Auto Monocyte % : x  Auto Eosinophil % : x  Auto Basophil % : x    06-10    139  |  99  |  36<H>  ----------------------------<  113<H>  5.0   |  28  |  1.12    Ca    9.4      10 Eliecer 2017 05:35  Phos  3.2     06-10  Mg     2.0     06-10

## 2017-06-11 NOTE — PROGRESS NOTE ADULT - PROBLEM SELECTOR PLAN 6
- Per Heme, pt with poor PS and unlikely to tolerate chemotherapy at this time  - pt seen by Palliative care, pt is DNR but not DNI, wife given MOLST form and awaiting for her to speak with rest of family and f/u on Monday.

## 2017-06-11 NOTE — PROGRESS NOTE ADULT - PROBLEM SELECTOR PLAN 1
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

## 2017-06-12 VITALS
OXYGEN SATURATION: 100 % | TEMPERATURE: 98 F | RESPIRATION RATE: 20 BRPM | HEART RATE: 72 BPM | SYSTOLIC BLOOD PRESSURE: 140 MMHG | DIASTOLIC BLOOD PRESSURE: 48 MMHG

## 2017-06-12 DIAGNOSIS — R06.02 SHORTNESS OF BREATH: ICD-10-CM

## 2017-06-12 LAB
BUN SERPL-MCNC: 38 MG/DL — HIGH (ref 7–23)
CALCIUM SERPL-MCNC: 9.3 MG/DL — SIGNIFICANT CHANGE UP (ref 8.4–10.5)
CHLORIDE SERPL-SCNC: 98 MMOL/L — SIGNIFICANT CHANGE UP (ref 98–107)
CO2 SERPL-SCNC: 26 MMOL/L — SIGNIFICANT CHANGE UP (ref 22–31)
CREAT SERPL-MCNC: 1.19 MG/DL — SIGNIFICANT CHANGE UP (ref 0.5–1.3)
GLUCOSE SERPL-MCNC: 105 MG/DL — HIGH (ref 70–99)
HCT VFR BLD CALC: 21 % — CRITICAL LOW (ref 39–50)
HGB BLD-MCNC: 6.3 G/DL — CRITICAL LOW (ref 13–17)
MAGNESIUM SERPL-MCNC: 1.9 MG/DL — SIGNIFICANT CHANGE UP (ref 1.6–2.6)
MCHC RBC-ENTMCNC: 30 % — LOW (ref 32–36)
MCHC RBC-ENTMCNC: 31.2 PG — SIGNIFICANT CHANGE UP (ref 27–34)
MCV RBC AUTO: 104 FL — HIGH (ref 80–100)
PHOSPHATE SERPL-MCNC: 4 MG/DL — SIGNIFICANT CHANGE UP (ref 2.5–4.5)
PLATELET # BLD AUTO: 157 K/UL — SIGNIFICANT CHANGE UP (ref 150–400)
PMV BLD: 8.9 FL — SIGNIFICANT CHANGE UP (ref 7–13)
POTASSIUM SERPL-MCNC: 4.8 MMOL/L — SIGNIFICANT CHANGE UP (ref 3.5–5.3)
POTASSIUM SERPL-SCNC: 4.8 MMOL/L — SIGNIFICANT CHANGE UP (ref 3.5–5.3)
RBC # BLD: 2.02 M/UL — LOW (ref 4.2–5.8)
RBC # FLD: 30 % — HIGH (ref 10.3–14.5)
SODIUM SERPL-SCNC: 139 MMOL/L — SIGNIFICANT CHANGE UP (ref 135–145)
WBC # BLD: 119.77 K/UL — CRITICAL HIGH (ref 3.8–10.5)
WBC # FLD AUTO: 119.77 K/UL — CRITICAL HIGH (ref 3.8–10.5)

## 2017-06-12 PROCEDURE — 99497 ADVNCD CARE PLAN 30 MIN: CPT | Mod: 25

## 2017-06-12 PROCEDURE — 99239 HOSP IP/OBS DSCHRG MGMT >30: CPT

## 2017-06-12 PROCEDURE — 99233 SBSQ HOSP IP/OBS HIGH 50: CPT

## 2017-06-12 RX ORDER — ALBUTEROL 90 UG/1
1 AEROSOL, METERED ORAL
Qty: 1 | Refills: 0 | OUTPATIENT
Start: 2017-06-12

## 2017-06-12 RX ORDER — MORPHINE SULFATE 50 MG/1
10 CAPSULE, EXTENDED RELEASE ORAL
Qty: 280 | Refills: 0 | OUTPATIENT
Start: 2017-06-12 | End: 2017-06-19

## 2017-06-12 RX ORDER — MORPHINE SULFATE 50 MG/1
5 CAPSULE, EXTENDED RELEASE ORAL
Qty: 140 | Refills: 0 | OUTPATIENT
Start: 2017-06-12 | End: 2017-06-19

## 2017-06-12 RX ORDER — MORPHINE SULFATE 50 MG/1
5 CAPSULE, EXTENDED RELEASE ORAL
Qty: 600 | Refills: 0 | OUTPATIENT
Start: 2017-06-12 | End: 2017-07-12

## 2017-06-12 RX ADMIN — HEPARIN SODIUM 5000 UNIT(S): 5000 INJECTION INTRAVENOUS; SUBCUTANEOUS at 05:52

## 2017-06-12 RX ADMIN — Medication 1 TABLET(S): at 05:53

## 2017-06-12 RX ADMIN — HEPARIN SODIUM 5000 UNIT(S): 5000 INJECTION INTRAVENOUS; SUBCUTANEOUS at 17:09

## 2017-06-12 RX ADMIN — MYCOPHENOLATE MOFETIL 250 MILLIGRAM(S): 250 CAPSULE ORAL at 17:09

## 2017-06-12 RX ADMIN — AMLODIPINE BESYLATE 10 MILLIGRAM(S): 2.5 TABLET ORAL at 05:53

## 2017-06-12 RX ADMIN — MYCOPHENOLATE MOFETIL 250 MILLIGRAM(S): 250 CAPSULE ORAL at 05:53

## 2017-06-12 RX ADMIN — Medication 3 MILLILITER(S): at 11:16

## 2017-06-12 RX ADMIN — Medication 300 MILLIGRAM(S): at 11:33

## 2017-06-12 RX ADMIN — Medication 1 TABLET(S): at 17:09

## 2017-06-12 RX ADMIN — Medication 1 MILLIGRAM(S): at 11:33

## 2017-06-12 RX ADMIN — Medication 3 MILLILITER(S): at 04:31

## 2017-06-12 RX ADMIN — FINASTERIDE 5 MILLIGRAM(S): 5 TABLET, FILM COATED ORAL at 11:33

## 2017-06-12 RX ADMIN — Medication 100 MICROGRAM(S): at 05:53

## 2017-06-12 NOTE — PROGRESS NOTE ADULT - PROBLEM SELECTOR PROBLEM 1
Hypocalcemia
Respiratory distress
Anemia
Hypocalcemia
Respiratory distress
AIHA (autoimmune hemolytic anemia)
CLL (chronic lymphocytic leukemia)

## 2017-06-12 NOTE — PROGRESS NOTE ADULT - SUBJECTIVE AND OBJECTIVE BOX
Patient is a 86y old  Male who presents with a chief complaint of Abnormal outpatient labs (01 Jun 2017 14:45)      SUBJECTIVE / OVERNIGHT EVENTS:    MEDICATIONS  (STANDING):  heparin  Injectable 5000Unit(s) SubCutaneous every 12 hours  ALBUTerol/ipratropium for Nebulization 3milliLiter(s) Nebulizer every 6 hours  finasteride 5milliGRAM(s) Oral daily  atorvastatin 20milliGRAM(s) Oral at bedtime  levothyroxine 100MICROGram(s) Oral daily  folic acid 1milliGRAM(s) Oral daily  allopurinol 300milliGRAM(s) Oral daily  calcium carbonate 1250 mG + Vitamin D (OsCal 500 + D) 1Tablet(s) Oral two times a day  ergocalciferol 62346Nxsk(s) Oral every week  amLODIPine   Tablet 10milliGRAM(s) Oral daily  mycophenolate mofetil 250milliGRAM(s) Oral two times a day    MEDICATIONS  (PRN):  guaiFENesin    Syrup 200milliGRAM(s) Oral every 6 hours PRN Cough      Vital Signs Last 24 Hrs  T(C): 36.6, Max: 36.9 (06-11 @ 21:19)  HR: 86 (74 - 88)  BP: 115/38 (115/38 - 137/54)  RR: 18 (18 - 18)  SpO2: 99% (96% - 100%)  Wt(kg): --  CAPILLARY BLOOD GLUCOSE    I&O's Summary    I & Os for current day (as of 12 Jun 2017 07:44)  =============================================  IN: 150 ml / OUT: 1580 ml / NET: -1430 ml      PHYSICAL EXAM:  GENERAL: NAD, well-developed  HEAD:  Atraumatic, Normocephalic  EYES: EOMI, PERRLA, conjunctiva and sclera clear  NECK: Supple, No JVD  CHEST/LUNG: Clear to auscultation bilaterally; No wheeze  HEART: Regular rate and rhythm; No murmurs, rubs, or gallops  ABDOMEN: Soft, Nontender, Nondistended; Bowel sounds present  EXTREMITIES:  2+ Peripheral Pulses, No clubbing, cyanosis, or edema  PSYCH: AAOx3  NEUROLOGY: non-focal  SKIN: No rashes or lesions    LABS:                        6.0    108.51 )-----------( 178      ( 11 Jun 2017 10:09 )             20.5     06-11    141  |  100  |  36<H>  ----------------------------<  157<H>  4.6   |  26  |  1.15    Ca    9.9      11 Jun 2017 10:09  Phos  3.8     06-11  Mg     2.0     06-11                RADIOLOGY & ADDITIONAL TESTS:    Imaging Personally Reviewed:    Consultant(s) Notes Reviewed:      Care Discussed with Consultants/Other Providers: Patient is a 86y old  Male who presents with a chief complaint of Abnormal outpatient labs (01 Jun 2017 14:45)      SUBJECTIVE / OVERNIGHT EVENTS:    MEDICATIONS  (STANDING):  heparin  Injectable 5000Unit(s) SubCutaneous every 12 hours  ALBUTerol/ipratropium for Nebulization 3milliLiter(s) Nebulizer every 6 hours  finasteride 5milliGRAM(s) Oral daily  atorvastatin 20milliGRAM(s) Oral at bedtime  levothyroxine 100MICROGram(s) Oral daily  folic acid 1milliGRAM(s) Oral daily  allopurinol 300milliGRAM(s) Oral daily  calcium carbonate 1250 mG + Vitamin D (OsCal 500 + D) 1Tablet(s) Oral two times a day  ergocalciferol 73953Wzsv(s) Oral every week  amLODIPine   Tablet 10milliGRAM(s) Oral daily  mycophenolate mofetil 250milliGRAM(s) Oral two times a day    MEDICATIONS  (PRN):  guaiFENesin    Syrup 200milliGRAM(s) Oral every 6 hours PRN Cough      Vital Signs Last 24 Hrs  T(C): 36.6, Max: 36.9 (06-11 @ 21:19)  HR: 86 (74 - 88)  BP: 115/38 (115/38 - 137/54)  RR: 18 (18 - 18)  SpO2: 99% (96% - 100%)  Wt(kg): --  CAPILLARY BLOOD GLUCOSE    I&O's Summary    I & Os for current day (as of 12 Jun 2017 07:44)  =============================================  IN: 150 ml / OUT: 1580 ml / NET: -1430 ml      PHYSICAL EXAM:  GENERAL: NAD, well-developed  HEAD:  Atraumatic, Normocephalic  EYES: EOMI, PERRLA, conjunctiva and sclera clear  NECK: Supple, No JVD  CHEST/LUNG: Clear to auscultation bilaterally; No wheeze  HEART: Regular rate and rhythm; No murmurs, rubs, or gallops  ABDOMEN: Soft, Nontender, Nondistended; Bowel sounds present  EXTREMITIES:  2+ Peripheral Pulses, No clubbing, cyanosis, or edema  PSYCH: AAOx3  NEUROLOGY: non-focal  SKIN: No rashes or lesions    LABS:                                      6.3    119.77 )-----------( 157      ( 12 Jun 2017 06:10 )             21.0   06-12    139  |  98  |  38<H>  ----------------------------<  105<H>  4.8   |  26  |  1.19    Ca    9.3      12 Jun 2017 06:10  Phos  4.0     06-12  Mg     1.9     06-12          Consultant(s) Notes Reviewed: palliative     Care Discussed with Consultants/Other Providers: palliative

## 2017-06-12 NOTE — PROVIDER CONTACT NOTE (CRITICAL VALUE NOTIFICATION) - SITUATION
Pt with low H & H 6.8
Pt admitted with hypomagnesemia
WBC 72.07 and h&h 6.5/22
CBC 61.47
H and H 6.6/20.5. WBC 60.47.
Lab called with critical lab valued .51 HGB 6.0 HCT 20.5
Lab called with critical lab values of .7 HBG 6.3 HCT 21.0
Pt admitted with hypomagnesemia
Pt with H/H of 6/20.8. Pt with WBC 87.05.
Received call from lab with WBC 94.7 and HGB 6.5
WBC 46.6 H&H 6.4/20.9
WBC 59.60
WBC 70.15 and H&H 6.6/21.3
as above
hemoglobin 6.8 and WBC 60.64

## 2017-06-12 NOTE — PROVIDER CONTACT NOTE (CRITICAL VALUE NOTIFICATION) - ACTION/TREATMENT ORDERED:
Awaiting order
per md will transfuse one unit of prbc
MD will evaluate pt
Will continue to monitor
Continue IV ABX no further action as per MD
I unit of PRBCs ordered for patient. 1unit divided into two.   1/2 unit administered as per order, patient tolerated well.
MD aware awaiting action
MD will decide on blood transfusion
No additional orders/treatment given at this time. Will continue to monitor.
No interventions presently
No interventions presently.
Pt to receive blood transfusion.
Transfuse patient and give lasix.
no new orders obtained. will continue to monitor.

## 2017-06-12 NOTE — PROGRESS NOTE ADULT - PROBLEM SELECTOR PLAN 9
- C/w BPH meds
- HSQ
- HSQ
- C/w BPH meds
- C/w synthroid
- HSQ

## 2017-06-12 NOTE — PROGRESS NOTE ADULT - PROBLEM SELECTOR PLAN 1
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

## 2017-06-12 NOTE — PROGRESS NOTE ADULT - SUBJECTIVE AND OBJECTIVE BOX
INTERVAL HPI/OVERNIGHT EVENTS:    Allergies    No Known Drug Allergies  to some types of seafood (Unknown)    Intolerances        ADVANCE DIRECTIVES:    DNR: [ ] YES [ ] NO           PRESENT SYMPTOMS:   SOURCE:  [ ] Patient   [ ] Family   [ ] Team     Pain:     Dyspnea:  [ ] YES [ ] NO  Anxiety:  [ ] YES [ ] NO  Fatigue: [ ] YES [ ] NO  Nausea: [ ] YES [ ] NO  Loss of Appetite: [ ] YES [ ] NO  Constipation [ ] YES   [ ] No     OTHER SYMPTOMS:  [ ] All other ROS negative     [ ] Unable to obtain due to poor mentation    MEDICATIONS  (STANDING):  heparin  Injectable 5000Unit(s) SubCutaneous every 12 hours  ALBUTerol/ipratropium for Nebulization 3milliLiter(s) Nebulizer every 6 hours  finasteride 5milliGRAM(s) Oral daily  atorvastatin 20milliGRAM(s) Oral at bedtime  levothyroxine 100MICROGram(s) Oral daily  folic acid 1milliGRAM(s) Oral daily  allopurinol 300milliGRAM(s) Oral daily  calcium carbonate 1250 mG + Vitamin D (OsCal 500 + D) 1Tablet(s) Oral two times a day  ergocalciferol 02147Kqas(s) Oral every week  amLODIPine   Tablet 10milliGRAM(s) Oral daily  mycophenolate mofetil 250milliGRAM(s) Oral two times a day    MEDICATIONS  (PRN):  guaiFENesin    Syrup 200milliGRAM(s) Oral every 6 hours PRN Cough      Karnofsky Performance Score/Palliative Performance Status Version 2:         %  Protein Calorie Malnutrition:  [ ] Mild   [ ] Moderate   [ ] Severe     Physical Exam:    General: [ ] Alert,  A&O x     [ ] lethargic   [ ] Agitated   [ ] Cachexia   HEENT: [ ] Normal   [ ] Dry mouth   [ ] ET Tube    [ ] Trach   Lungs: [ ] Clear [ ] Rhonchi  [ ] Crackles [ ] Wheezing [ ] Tachypnea  [ ] Audible excessive secretions   Cardiovascular:  [ ] Regular rate and rhythm  [ ] Irregular [ ] Tachycardia   [ ] Bradycardia   Abdomen: [ ] Soft  [ ] Distended  [ ]  [ ] +BS  [ ] Non tender [ ] Tender  [ ]PEG   [ ] NGT   Last BM:     Genitourinary:  [ ] Normal [ ] Incontinent   [ ] Oliguria/Anuria   [ ] Stapleton  Musculoskeletal:  [ ] Normal   [ ] Generalized weakness  [ ] Bedbound   Neurological: [ ] No focal deficits  [ ] Cognitive impairment     Skin: [ ] Normal   [ ] Pressure ulcers     Vital Signs Last 24 Hrs  T(C): 36.6, Max: 36.9 (06-11 @ 21:19)  T(F): 97.9, Max: 98.5 (06-11 @ 21:19)  HR: 82 (74 - 88)  BP: 115/38 (115/38 - 137/54)  BP(mean): --  RR: 18 (18 - 18)  SpO2: 99% (96% - 100%)    LABS:                        6.3    119.77 )-----------( 157      ( 12 Jun 2017 06:10 )             21.0     06-12    139  |  98  |  38<H>  ----------------------------<  105<H>  4.8   |  26  |  1.19    Ca    9.3      12 Jun 2017 06:10  Phos  4.0     06-12  Mg     1.9     06-12          I&O's Summary    I & Os for current day (as of 12 Jun 2017 11:35)  =============================================  IN: 150 ml / OUT: 1580 ml / NET: -1430 ml      RADIOLOGY & ADDITIONAL STUDIES:

## 2017-06-12 NOTE — PROVIDER CONTACT NOTE (CRITICAL VALUE NOTIFICATION) - RECOMMENDATIONS
Md made aware
prbc transfusion
Blood transfusion
Will continue to monitor
Blood transfusion
Blood transfusion.
Continue IV ABX
MD made aware.
MD masters
MD notified awaiting action
Md made aware.
Monitor
Monitor
No actions will be taken at this time. Will continue monitoring pt.
Transfuse pt.

## 2017-06-12 NOTE — PROVIDER CONTACT NOTE (CRITICAL VALUE NOTIFICATION) - BACKGROUND
dx: CLL, pleural effusion
CLL Leukemia
Pt was admitted for hypomagnesia
Dx Hypocalcemia, Hx CLL
Hx of HTN, BPH, CLL leukemia
Patient is admitted with hypocalcemia, hypomagnesemia and hx of anemia
Patient is admitted with hypomagnesemia,
Pt admitted with Hypocalcemia, CLL and was recently made a DNR
Pt was admitted for hypomagnesia
Pt with CLL.
Pt with CLL.
Pt with hx of CLL s/p chemotherapy
pt admitted with hypomagnesemia

## 2017-06-12 NOTE — PROVIDER CONTACT NOTE (CRITICAL VALUE NOTIFICATION) - NAME OF MD/NP/PA/DO NOTIFIED:
MD. Davis
DR Echols
Dr Hartmann # [id number]
Dr [id number]
Dr Rasmussen [id number] aware
Dr Sanchez [id number] aware
Dr Valerio
Dr Valerio aware
Dr. Echols
Dr. Sanchez
Dr. gonzales [id number]#
Dr. gonzales [id number]#
EDER
MD Valerio
SLIM White

## 2017-06-12 NOTE — PROGRESS NOTE ADULT - PROVIDER SPECIALTY LIST ADULT
Heme/Onc
Internal Medicine
MICU
Palliative Care
Pulmonology
Pulmonology
Palliative Care

## 2017-06-12 NOTE — PROGRESS NOTE ADULT - PROBLEM SELECTOR PLAN 1
As per Heme-onc, patient will most likely be unable to tolerate aggressive chemotherapy. Continue supportive care. Hospice referral made.

## 2017-06-12 NOTE — PROGRESS NOTE ADULT - PROBLEM SELECTOR PROBLEM 9
Benign Prostatic Hypertrophy
Prophylactic use of unfractionated heparin for venous thromboembolism
Prophylactic use of unfractionated heparin for venous thromboembolism
Benign Prostatic Hypertrophy
Hypothyroidism
Prophylactic use of unfractionated heparin for venous thromboembolism

## 2017-06-12 NOTE — PROGRESS NOTE ADULT - SUBJECTIVE AND OBJECTIVE BOX
INTERVAL HPI/OVERNIGHT EVENTS:    No issues over the weekend. Remains transfusion dependent.    Allergies    No Known Drug Allergies  to some types of seafood (Unknown)    Intolerances        ADVANCE DIRECTIVES:    DNR: [x ] YES [ ] NO           PRESENT SYMPTOMS:   SOURCE:  [x ] Patient   [ ] Family   [ ] Team     Pain:     Dyspnea:  [ ] YES [x ] NO  Anxiety:  [ ] YES [x ] NO  Fatigue: [x ] YES [ ] NO  Nausea: [ ] YES [x ] NO  Loss of Appetite: [ ] YES [x ] NO  Constipation [ ] YES   [ x] No     OTHER SYMPTOMS:  [ x] All other ROS negative     [ ] Unable to obtain due to poor mentation    MEDICATIONS  (STANDING):  heparin  Injectable 5000Unit(s) SubCutaneous every 12 hours  ALBUTerol/ipratropium for Nebulization 3milliLiter(s) Nebulizer every 6 hours  finasteride 5milliGRAM(s) Oral daily  atorvastatin 20milliGRAM(s) Oral at bedtime  levothyroxine 100MICROGram(s) Oral daily  folic acid 1milliGRAM(s) Oral daily  allopurinol 300milliGRAM(s) Oral daily  calcium carbonate 1250 mG + Vitamin D (OsCal 500 + D) 1Tablet(s) Oral two times a day  ergocalciferol 97227Qort(s) Oral every week  amLODIPine   Tablet 10milliGRAM(s) Oral daily  mycophenolate mofetil 250milliGRAM(s) Oral two times a day    MEDICATIONS  (PRN):  guaiFENesin    Syrup 200milliGRAM(s) Oral every 6 hours PRN Cough      Karnofsky Performance Score/Palliative Performance Status Version 2:         40%  Protein Calorie Malnutrition:  [ ] Mild   [ ] Moderate   [ ] Severe     Physical Exam:    General: [x ] Alert,  A&O x 1-2     [ ] lethargic   [ ] Agitated   [ ] Cachexia   HEENT: [ ] Normal   [x ] Dry mouth   [ ] ET Tube    [ ] Trach   Lungs: [x ] Clear [ ] Rhonchi  [ ] Crackles [ ] Wheezing [ ] Tachypnea  [ ] Audible excessive secretions   Cardiovascular:  [ x] Regular rate and rhythm  [ ] Irregular [ ] Tachycardia   [ ] Bradycardia   Abdomen: [ x] Soft  [ ] Distended  [ ]  [ ] +BS  [ ] Non tender [ ] Tender  [ ]PEG   [ ] NGT   Last BM:     Genitourinary: [x ] Normal   [ ] Incontinent   [ ] Oliguria/Anuria   [ ] Stapleton  Musculoskeletal:  [ ] Normal   [x ] Generalized weakness  [ ] Bedbound   Neurological: [x ] No focal deficits  [ ] Cognitive impairment   [+] intermittent confusions  Skin: [x ] Normal   [ ] Pressure ulcers       Vital Signs Last 24 Hrs  T(C): 36.6, Max: 36.9 (06-11 @ 21:19)  T(F): 97.9, Max: 98.5 (06-11 @ 21:19)  HR: 82 (74 - 88)  BP: 115/38 (115/38 - 137/54)  BP(mean): --  RR: 18 (18 - 18)  SpO2: 99% (96% - 100%)    LABS:                        6.3    119.77 )-----------( 157      ( 12 Jun 2017 06:10 )             21.0     06-12    139  |  98  |  38<H>  ----------------------------<  105<H>  4.8   |  26  |  1.19    Ca    9.3      12 Jun 2017 06:10  Phos  4.0     06-12  Mg     1.9     06-12          I&O's Summary    I & Os for current day (as of 12 Jun 2017 12:25)  =============================================  IN: 150 ml / OUT: 1580 ml / NET: -1430 ml      RADIOLOGY & ADDITIONAL STUDIES:

## 2017-06-12 NOTE — PROGRESS NOTE ADULT - PROBLEM SELECTOR PROBLEM 2
Bronchiectasis
Bronchiectasis
Respiratory distress
Hypocalcemia
Pleural effusion
Pleural effusion
Respiratory distress
Anemia
CLL (chronic lymphocytic leukemia)
CLL (chronic lymphocytic leukemia)

## 2017-06-12 NOTE — PROGRESS NOTE ADULT - ATTENDING COMMENTS
86 year old man with CLL c/b relapsed warm AIHA, HTN, HLD, hypothyroidism, BPH, bronchiectasis, RLL PNA c/b parapneumonic effusion s/p CT and MIST II tx protocol with frequent admissions for shortness of breath this time admitted for hypocalcemia with hospital course c/b respiratory distress in the setting of increased pulmonary edema after blood transfusion    doing well today   supplemental oxygen as needed  please call with questions
Patient seen and examined. Agree with above note by resident.    1. Hypocalcemia - Albumin is normal. Suspect multifactorial etiology. PTH elevated which is consistent with either vitD def vs tumor lysis vs hypomagnesemia . On medication review, no meds identified that can cause hypocalcemia. Supplement calcium both with PO and IV. Vit D def noted and will start supplementation. Tumor lysis was considered however uric acid and other electrolytes are WNL.     2. Hypomagnesemia - no signs of GI losses identified at this time. Perhaps poor PO intake? Supplement mag. Renal loss from diuretic use is also a consideration.  Also will stop PPI given no clear indication at this time and PPI can cause low mag level.     3. Anemia - Hgb<7, will give 1 unit PRBC    4. CLL: heme evaluation for any further management recommendations.     5. Chronic productive cough - Chronic in nature. NO signs of systemic infection present at this time. CXR appears unchanged. Would monitor clinically.   Plan discussed with patient and team.
Attending Attestation:   Patient seen and examined. Agree with above note by resident.   Acute hypoxic respiratory failure - likely due to flash pulmonary edema after PRBC transfusion .Clinically improving with IV lasix and bipap. will monitor closely .Patient with elevated procalcitonin in the setting of infiltrates on cxr -c/w  empiric antibiotics for pneumonia, will consider ID eval    Hypocalcemia - Albumin is normal, likely secondary to vit d deficiency  . Supplement calcium both with PO and IV. .   . Vit D def: start supplementation q weekly.   . Anemia - s/p transfusion of PRBC  with lasix , monitor CBC post transfusion    CLL:  continue supportive care, Heme following   .
Attending Attestation:   Patient seen and examined. Agree with above note by resident.   Acute hypoxic respiratory failure - likely due to flash pulmonary edema after PRBC transfusion .Clinically improving with IV lasix and bipap. will monitor closely .Patient with elevated procalcitonin in the setting of infiltrates on cxr - will start empiric antibiotics for pneumonia, will consider ID eval    Hypocalcemia - Albumin is normal, likely secondary to vit d deficiency  . Supplement calcium both with PO and IV. .   . Vit D def: start supplementation q weekly.   . Anemia - will transfuse 1/2 units with lasix , monitor CBC post transfusion    CLL:  continue supportive care, Heme following   .
Pt awake, alert, NAD. transfusion dependent. d/w Dr. Mathew, poor prognosis. Hospice referral.
Currently comfortable  Plan for hospice care  discharge next week
Patient seen and examined by me. Case discussed with resident and agree with findings and plan as documented in the resident's note.  Pt received mycophenolate yesterday.  He requires another blood transfusion today with IV Lasix due to persistent anemia. Euvolemic; monitor fluid status.  No longer on abx.  No s/s of infection.  Palliative care consult much appreciated.
Patient seen and examined by me. Case discussed with resident and agree with findings and plan as documented in the resident's note.  Pt will get Cellcept today.  Hold off on any further transfusions.  Daily CBC. Supplemental O2.
Patient seen and examined by me. Pt has lower hgb today, warranting another unit pRBC's in half units with lasix in between.  Reconsult hematology given the need for frequent blood transfusions.  Monitor fluid status. Case discussed with resident and agree with the resident's findings and plan as documented in the resident's note. Wife would like pt to go home after hospital stay.
87yo M w/ CLL, AIHA, last treated with RCD in April c/b recurrent pneumonia, now w/ worsening anemia suspicious for AIHA. Pt unfortunately has poor PS and unlikely to tolerate chemotherapy at this time, trial of cellcept. Palliative care c/s.
Patient seen and examined by me. Case discussed with resident and agree with findings and plan as documented in the resident's note. Respiratory change likely secondary to flash pulm edema - will d/w pulmonology if IV abx are indicated. Per wife, pt has home O2 at 2LPM via [place]. PT eval.
Patient seen and examined. Agree with above note by resident.    1. Acute hypoxic respiratory failure - likely due to flash pulmonary edema after PRBC transfusion yesterday. Clinically improving with IV lasix and bipap. However still remains hypoxic to 88on RA. Start nasal canula 4L o2. Would give another dose of IV lasix 40 today. Reassess daily for need to more diuretics. When euvolemic can start home dose of lasix PO 40. If increased work of breathing place back on bipap. Strict i/os. Also US chest to look for any indication for consolidation. If suspicion for consolidation, will get CT chest if patient remains stable today. Also will start empiric abx if US concerning for consolidation. Currently no signs of systemic infection. Remains critically ill.     2. Hypocalcemia - Albumin is normal. Suspect multifactorial etiology. PTH elevated which is consistent with either vitD def vs tumor lysis vs hypomagnesemia . On medication review, no meds identified that can cause hypocalcemia. Supplement calcium both with PO and IV. Vit D def noted and will start supplementation. Tumor lysis was considered however uric acid and other electrolytes are WNL.     2. Vit D def: start supplementation q weekly.     3. Anemia - Tansfuse if hgb<7. Would give 1/2 units with lasix if needed.     4. CLL: heme evaluation for any further management recommendations.      Plan discussed with patient and team..
Patient seen and examined. Agree with above note by resident.    1. Hypocalcemia - Albumin is normal. Will check iPTH and vit D. On medication review, no meds identified that can cause hypocalcemia. Supplement calcium both with PO and IV. Hypomagnesemia can cause hypocalcemia however no clear cause of low magnesium identified yet. Tumor lysis was considered however uric acid is normal.    2. Hypomagnesemia - no signs of GI losses identified at this time. Perhaps poor PO intake? Supplement mag. Renal loss from diuretic use is also a consideration.      3. Anemia - monitor counts. Transfuse if <7    4. CLL: heme evaluation for any further management recommendations.     5. Chronic productive cough - Chronic in nature. NO signs of systemic infection present at this time. CXR appears unchanged. Would monitor clinically. Will reach out to outpt pulmonologist.     Plan discussed with patient and team
Patient seen and examined.  Case discussed with house staff.  Agree with above as edited.   Patient with refractory AIHA. Appreciate heme and palliative f/u.  Planning for d/c with home hospice  D/C time 40 min
85yo M w/ CLL, AIHA, last treated with RCD in April c/b recurrent pneumonia, now w/ worsening anemia suspicious for AIHA. Pt unfortunately has poor PS and unlikely to tolerate chemotherapy at this time. Consider cellcept vs cyclosporine as an alternate.
Pt has exertional sob. transfusion dependent. very sick. poor prognosis   d/w daughter and wife at bedside.

## 2017-06-12 NOTE — PROGRESS NOTE ADULT - PROBLEM SELECTOR PROBLEM 10
Prophylactic use of unfractionated heparin for venous thromboembolism

## 2017-06-12 NOTE — PROGRESS NOTE ADULT - PROBLEM SELECTOR PLAN 5
Discussion had with patients wife and 3 children. They are aware of there fathers condition. Hospice was discussed with the family in detail especially that Hospice does not do blood transfusions unless it is for symptomatic reasons. They also stated that DNR/DNI is consistent with there goal for there father at the end of life. 30 minutes was spent talk to the family.

## 2017-06-12 NOTE — PROGRESS NOTE ADULT - PROBLEM SELECTOR PLAN 6
- Per Heme, pt with poor PS and unlikely to tolerate chemotherapy at this time  - pt seen by Palliative care, pt is DNR but not DNI, wife given MOLST form and awaiting for her to speak with rest of family   -F/u with wife today regarding DNI status and further GOC discussions - Per Heme, pt with poor PS and unlikely to tolerate chemotherapy at this time  - pt seen by Palliative care, pt made DNI, home hospice referral placed

## 2017-06-12 NOTE — PROVIDER CONTACT NOTE (CRITICAL VALUE NOTIFICATION) - ASSESSMENT
Blood drawn and sent
WBC 72.07 and h&h 6.5/22
ABG, and veinous blood drawn
Alert and oriented x2, disoriented to time. Pt is asymptomatic at the present with stable vital signs
Generalized weakness, with intermittent coughing
No dizziness
Patient is asymptomatic.
Patient is drowsy
Pt asymptomatic. VSS.
Pt exhibits generalized weakness.
Pt shows generalized weakness.
WBC 59.60
WBC 70.15 and H&H 6.6/21.3. No s&s of bleeding assessed.
hemoglobin 6.8 and WBC 60.64
no acute distress

## 2017-06-20 ENCOUNTER — OUTPATIENT (OUTPATIENT)
Dept: OUTPATIENT SERVICES | Facility: HOSPITAL | Age: 82
LOS: 1 days | End: 2017-06-20
Payer: MEDICARE

## 2017-06-20 PROCEDURE — 86850 RBC ANTIBODY SCREEN: CPT

## 2017-06-23 PROCEDURE — 86077 PHYS BLOOD BANK SERV XMATCH: CPT

## 2017-06-30 DIAGNOSIS — D70.9 NEUTROPENIA, UNSPECIFIED: ICD-10-CM

## 2017-11-21 NOTE — H&P ADULT. - NEGATIVE GASTROINTESTINAL SYMPTOMS
pt is here for removal of sutures from left hand pinky.
no nausea/no abdominal pain/no vomiting/no change in bowel habits

## 2017-12-27 NOTE — ED ADULT NURSE NOTE - PT NEEDS ASSIST
Subjective:      Patient ID: Analisa Rodríguez is a 24 m.o. male. Patient presents for acute medical problem. Medical assistant notes reviewed. Cough   This is a new problem. Episode onset: a week. The problem has been unchanged. The problem occurs constantly. The cough is productive of sputum. Associated symptoms include nasal congestion. The symptoms are aggravated by lying down. Treatments tried: cold and flu. The treatment provided no relief. mother states pt had a fever on Saturday but was never measured. Parents deny any fevers or chills. Most of the congestion seems to be lower respiratory respiratory. Review of Systems   Respiratory: Positive for cough. No Known Allergies  Objective:   Physical Exam   Constitutional: He appears well-developed and well-nourished. He is active. No distress. HENT:   Right Ear: Tympanic membrane normal.   Left Ear: Tympanic membrane normal.   Nose: Nose normal.   Mouth/Throat: Mucous membranes are moist. Oropharynx is clear. Neck: Neck supple. No neck adenopathy. Pulmonary/Chest: Effort normal. No accessory muscle usage. No respiratory distress. He has no decreased breath sounds. He has wheezes (findings scattered). He has rhonchi. He exhibits no deformity. Neurological: He is alert. Assessment:      Hilary Hanley was seen today for cough. Diagnoses and all orders for this visit:    Mild persistent asthmatic bronchitis with acute exacerbation    Other orders  -     amoxicillin (AMOXIL) 250 MG/5ML suspension; Take 6.3 mLs by mouth 2 times daily for 10 days  -     PrednisoLONE 15 MG/5ML SOLN; 2 ML bid FOR 3 DAYS            Plan:      Humidification of the bedroom was discussed.   RTC when necessary yes

## 2018-03-19 NOTE — PATIENT PROFILE ADULT. - AS SC BRADEN ACTIVITY
3) walks occasionally

## 2018-07-24 PROBLEM — R39.89 URINARY PROBLEM: Status: ACTIVE | Noted: 2017-02-17

## 2020-01-07 NOTE — PROVIDER CONTACT NOTE (CRITICAL VALUE NOTIFICATION) - TEST AND RESULT REPORTED:
elevated WBC Cartilage Graft Text: The defect edges were debeveled with a #15 scalpel blade.  Given the location of the defect, shape of the defect, the fact the defect involved a full thickness cartilage defect a cartilage graft was deemed most appropriate.  An appropriate donor site was identified, cleansed, and anesthetized. The cartilage graft was then harvested and transferred to the recipient site, oriented appropriately and then sutured into place.  The secondary defect was then repaired using a primary closure.

## 2020-03-25 NOTE — DIETITIAN INITIAL EVALUATION ADULT. - PROBLEM SELECTOR PLAN 1
SOB 2/2 pleural effusion.  Based on Light’s criteria, effusion is exudative. Suspect malignant effusion given CLL hx.  Well's score 2.5 which is low risk for PE.   -S/p diagnostic & therapeutic thoracentesis in ED  -Pulm c/s in AM re: further thoracentesis & need for pleurex catheter given risk of reaccumulation   -DuoNeb & O2 PRN
136

## 2020-08-13 NOTE — ED ADULT NURSE NOTE - PAIN RATING/NUMBER SCALE (0-10): REST
"    Occupational Therapy Treatment/Progress Note     Date: 8/13/2020  Name: Jae Millan  Clinic Number: [id number]    Therapy Diagnosis:   Encounter Diagnoses   Name Primary?    Range of motion deficit Yes    Swelling of left hand     Decreased strength of upper extremity      Physician: Charis Salazar MD    Physician Orders: OT eval and treat  Medical Diagnosis: CVA  Evaluation Date: 7/10/2020  Plan of Care Expiration Period: 9/18/20  Insurance Authorization period Expiration: 12/31/20  Date of Return to MD: TBD  Visit # / Visits Authorized: 9 / 20  FOTO: 5th visit      Time In: 9:30am  Time Out: 10:15am  Total Billable (one on one) Time: 45 minutes     Precautions: Standard    Subjective     Pt reports: "My arm feels good. I am getting dressed quicker"   he was  somewhat compliant with home exercise program given last session.   Response to previous treatment: positive  Functional change: ongoing    Pain: 2/10  Location: left shoulder      Objective     Moist heat prior to tx session    Jae received the following manual therapy techniques for 15 minutes:   - Supine PROM of L GH joint: FF/Abd/ER/IR/Sup  - General wrist stretches including               1. Scaphoid on radius               2. Increasing mobility of metacarpals               3. Carpal rolls               4. Increasing mobility towards radial deviation               5. Increasing mobility of wrist towards extension              6. Increasing mobility of wrist towards supination/pronation  - Side lying scap mobs in all planes    Jae participated in therapeutic exercise for 30 minutes:  - UBE x10 min with reversal half way for increased UE activation and strength   Cognitive Exam:  Oriented: Person, Place, Time and Situation  Behaviors: normal, cooperative  Follows Commands/attention: Follows multistep  commands  Communication: clear/fluent  Memory: No Deficits noted as determined by 3 word recall after 1 minute and 3 minutes  Safety " awareness/insight to disability: aware of diagnosis, treatment, and prognosis  Coping skills/emotional control: Appropriate to situation     Visual/Perceptual:  Tracking: intact   Saccades: intact       Acuity: corrected by glasses  Convergence: WFL  Nystagmus: NEG   R/L discrimination: intact  Visual field: intact  Motor Planning Praxis: intact        Physical Exam:  Postural examination/scapula alignment: Rounded shoulder, Head forward and Affected scapula upwardly rotated  Joint integrity: firm end feel  Skin integrity: warm/dry  Edema: Mild L hand/UE  Palpation: TTP L GH joint     R ROM and strength WFL all planes     Joint Evaluation  AROM  7/10/2020 PROM   7/10/2020 AROM  8/13/20 PROM  8/13/20     Left Left left left   Shoulder flex 0-180 70 145 90 160   Shoulder Abd 0-180 55 115 72 140   Shoulder ER 0-90 To neautral 35 Neutral  45   Shoulder IR 0-90 60 WNL WFL WNL   Shoulder Extension 0-80 30 45 40 73   Shoulder Horizontal adduction 0-90 25 WFL 25 WFL   Elbow flex/ext 0-150 10/105 WFL 0/105 WNL   Wrist flex 0-80 0 70 ~5 55   Wrist ext 0-70 0 40 neutral 50   Supination 0-80 30 WNL 30 WNL   Pronation 0-80 WNL (tone) WNL WNL WNL   UD ~15 WNL ~15 WNL   RD 0 WNL 0 WNL      Fist: increased edema and tone        Strength 7/10/2020 8/13/20   **within available ROM** Left    Shoulder flex 3+/5 3+/5   Shoulder abd 3+/5 3+/5   Shoulder ER 3/5 3//5   Shoulder IR 3+/5 3+/5   Shoulder Extension 4-/5 4-/5   Shoulder Horizontal adduction 4-/5 4-/5   Elbow flex 4-/5 4-/5   Elbow ext 4/5 4/5   Wrist flex 2-/5 2-/5   Wrist ext 2-/5 2-/5   Supination 2-/5 2-/5   Pronation 3/5 3/5   UD 3-/5 3-/5   RD 1/5 1/5      Gross motor coordination:   · SHEILA (Rapid Alternating Movements): slowed, impaired L  · Finger to Nose (5 times): impaired L  · Finger Flicks (coordination moving from digit flexion to digit extension): impaired L     Tone:  Modified Estefanía Scale:   1-  Slight increase in muscle tone, manifested by a catch and release  or by minimal resistance at the end of the range of motino when the affected part(s) is moved in flexion or extension     Comments: increased hand edema      Sensation:  Jae  reports numbness in L hand  [id number]}     Balance:   Static Sit - NORMAL: No deviations seen in posture held statically  Dynamic sit- NORMAL: No deviations seen in posture held statically  Static Stand - NT  Dynamic stand - NT     Endurance Deficit: none                    Functional Status      Functional Mobility:  Bed mobility: Mod I  Roll to left: Mod I  Roll to right: Mod I  Supine to sit: Mod I  Sit to supine: Mod I  Transfers to bed: Mod I  Transfers to toilet: Mod I  Car transfers: Mod I  Wheelchair mobility: n/a     ADL's:  Feeding: I  Grooming: Mod I  Hygiene: Mod I  UB Dressing: Mod I  LB Dressing: Mod I  Toileting: Mod I  Bathing: Mod I     IADL's:  Homecare: Mod I  Cooking: Mod I  Laundry: Mod I  Yard work: n/a  Use of telephone:  I  Money management: I  Medication management: Mod I  Handwriting:I  Technology Use:I           Jae participated in neuromuscular re-education activities to improve: Coordination, Kinesthetic, Sense, Proprioception and Posture for 0 minutes. The following activities were included:  - Scapular activation for improved postural alignment, elevation and retraction 2x10 with 5 sec holds each   - Modified quad closed chain weight bearing into L UE  while R UE reached across midline to place and remove squigz from mirror Verbal and tactile cues needed for weight shifts along with [place] assist from therapist to maintain L hand placement      Jae received Unattended ESTIM (NMES) for 0 minutes:  - Austrian 10/10 to L long wrist extensors, cues to extend when he feels contraction       Home Exercises and Education Provided     Education provided:   - wear schedule for resting hand orthotic   - Progress towards goals     Written Home Exercises Provided: not at this time       Assessment     Jae had good  improvements in AROM especially in his shoulder. He is reporting improved dressing speed at home. Tone managed well with ROM exercises. All goals met with one new goal added.  He continues with L side weakness affecting his ability to complete ADL/IALDs.      Jae is progressing well towards his goals and there are no updates to goals at this time. Pt prognosis is Good.     Pt will continue to benefit from skilled outpatient occupational therapy to address the deficits listed in the problem list on initial evaluation provide pt/family education and to maximize pt's level of independence in the home and community environment.     Anticipated barriers to occupational therapy: time since stroke     Pt's spiritual, cultural and educational needs considered and pt agreeable to plan of care and goals.    Goals:  Short Term Goals: 5 weeks   - Pt. Will be instructed on self ROM exercises MET  - Pt. Will be instructed on edema management at home MET, self massage/elevating   - Pt. Will have improved L shoulder FF and ABD of at least 10 degrees for increased functional mobility for dressing MET  - Pt. Will correctly don/doff resting night splint MET  - Pt. Will wear resting night splint at least 3x week MET     Long Term Goals:  By D/C  - Pt. Will be independent with HEP   - Pt. Will increase AROM of L shoulder FF and ABD of at least 15 degrees for increased functional mobility for dressing and bathing MET  - Pt. Will wear resting night splint at least 5x week at d/c date MET  - Pt. Will have improved supportive use of L hand during simple meal prep     More goals to be established as appropriate      Plan   Certification Period/Plan of care expiration: 7/10/2020 to 9/18/20.     Outpatient Occupational Therapy 2 times weekly for 10 weeks to include the following interventions: Electrical Stimulation of L UE, Manual Therapy, Moist Heat/ Ice, Neuromuscular Re-ed, Orthotic Management and Training, Paraffin, Patient Education,  Self Care, Therapeutic Activites and Therapeutic Exercise.     IRLANDA Galicia       Updates/Grading for next session: as tolerated       IRLANDA Galicia      0

## 2021-01-01 NOTE — ED PROVIDER NOTE - PMH
Adult Hypothyroidism    Benign Prostatic Hypertrophy    CLL (chronic lymphocytic leukemia)    Hyperlipidemia    Hypertension
11.9

## 2022-10-18 NOTE — ED ADULT TRIAGE NOTE - CHIEF COMPLAINT QUOTE
Pt comes in for abnormal labs values after going to see PMD this morning for blood work. Pt received call about 30 minutes ago stating H/H was low and he need to come to ER. PMHX of CLL, last chemo 4 wks ago. Sski Pregnancy And Lactation Text: This medication is Pregnancy Category D and isn't considered safe during pregnancy. It is excreted in breast milk.

## 2022-10-19 NOTE — ED ADULT NURSE NOTE - RESPIRATORY RATE (BREATHS/MIN)
[19

## 2023-04-21 NOTE — PATIENT PROFILE ADULT. - NS PRO ABUSE SCREEN AFRAID ANYONE YN
Down from 36 to 19 as of 4/23 office visit.  He is being switched to once weekly prescription therapy by nephrology, we will see what labs show on return to office.   no

## 2024-04-02 NOTE — PATIENT PROFILE ADULT. - NS PRO ABUSE SCREEN SUSPICION NEGLECT YN
[facility] REQUESTED SERVICE FOR CHEST CT RECEIVED VIA MAIL.    FORMS SCANNED INTO PT CHART.   unable to assess

## 2024-05-09 NOTE — PROGRESS NOTE ADULT - PROBLEM/PLAN-1
DISPLAY PLAN FREE TEXT
Normal vision: sees adequately in most situations; can see medication labels, newsprint

## 2025-07-22 NOTE — PROGRESS NOTE ADULT - PROBLEM SELECTOR PLAN 7
Pulmonary/Critical Care Inpatient Progress Note    Interval History     Awake  On 3 LPM of NC  Denied chest pain, shortness of breath or cough       ROS:  As mentioned above    No intake/output data recorded.  Recent Labs   Lab 07/22/25  0441 07/21/25  0501 07/19/25  1628 07/19/25  0532   WBC 10.8 13.4*  --  8.0   RBC 3.64* 3.94*  --  5.11   HGB 9.1* 9.9* 11.5* 12.5*   HCT 28.9* 31.8* 37.3* 40.0   MCV 79.4 80.7  --  78.3   MCH 25.0* 25.1*  --  24.5*   MCHC 31.5* 31.1*  --  31.3*    203  --  268     Recent Labs   Lab 07/21/25  0501 07/19/25  0532 07/18/25  0955 07/17/25  1344   CO2 30 25 28 31   BUN 18 18 19 20   AST  --   --   --  23     No results found     I reviewed all relevant chest x-rays and CT chest scans     Physical Exam     Visit Vitals  /69   Pulse 72   Temp 97.9 °F (36.6 °C) (Oral)   Resp 18   Ht 5' 10\" (1.778 m)   Wt (!) 141 kg (310 lb 13.6 oz)   SpO2 95%   BMI 44.60 kg/m²     Physical Exam:    General: no acute distress.   Head: atraumatic   Neck: supple   Resp: Clear to auscultate bilaterally, no wheezes or crackles   CV: regular rhythm rate, no audible murmur  Abd: soft, non-distended and non-tender   Ext: no clubbing or edema   Skin: no visible rashes     Assessment and Plan:     82-year-old male patient presented to the hospital with a RLE wound. He was noted to have unstable thoracic spine fractures. He had spinal fusion yesterday on 7/19. He was found to be hypothermic and pulmonary service consulted. He was evaluated by ID team, no concern for infection at this time. He had ultrasound evaluation of the RLE due to swelling but no DVT was found. Labs are notable for normal WBC. Chest x-ray revealed calcified granuloma in the left lung, small left-sided pleural effusion, left lower lobe opacity.     Assessment/Plan    # Acute hypoxic respiratory failure 2/2 bibasilar pneumonia/atelectasis  -requiring oxygen via NC   -Keep SPO2>93%  - Bronchial hygiene     # Bibasilar  pneumonia/atelectasis   -Intensive bronchial hygiene  - Aspiration precautions     # Thoracic spine fracture   -S/p decompression and T8-L1 posterior spinal fusion 7/19/25   - Pain control                  - C/w synthroid